# Patient Record
Sex: MALE | Race: WHITE | NOT HISPANIC OR LATINO | Employment: FULL TIME | URBAN - METROPOLITAN AREA
[De-identification: names, ages, dates, MRNs, and addresses within clinical notes are randomized per-mention and may not be internally consistent; named-entity substitution may affect disease eponyms.]

---

## 2017-04-13 ENCOUNTER — HOSPITAL ENCOUNTER (OUTPATIENT)
Facility: MEDICAL CENTER | Age: 62
End: 2017-04-13
Attending: EMERGENCY MEDICINE | Admitting: SURGERY
Payer: COMMERCIAL

## 2017-04-13 VITALS
WEIGHT: 193.12 LBS | SYSTOLIC BLOOD PRESSURE: 116 MMHG | HEIGHT: 70 IN | DIASTOLIC BLOOD PRESSURE: 90 MMHG | TEMPERATURE: 97.9 F | HEART RATE: 68 BPM | RESPIRATION RATE: 20 BRPM | OXYGEN SATURATION: 95 % | BODY MASS INDEX: 27.65 KG/M2

## 2017-04-13 DIAGNOSIS — K40.30 INCARCERATED LEFT INGUINAL HERNIA: ICD-10-CM

## 2017-04-13 PROBLEM — K46.0 INCARCERATED HERNIA: Status: ACTIVE | Noted: 2017-04-13

## 2017-04-13 LAB
ANION GAP SERPL CALC-SCNC: 8 MMOL/L (ref 0–11.9)
APPEARANCE UR: CLEAR
APTT PPP: 26.2 SEC (ref 24.7–36)
BASOPHILS # BLD AUTO: 0.8 % (ref 0–1.8)
BASOPHILS # BLD: 0.06 K/UL (ref 0–0.12)
BILIRUB UR QL STRIP.AUTO: NEGATIVE
BUN SERPL-MCNC: 18 MG/DL (ref 8–22)
CALCIUM SERPL-MCNC: 8.8 MG/DL (ref 8.4–10.2)
CHLORIDE SERPL-SCNC: 105 MMOL/L (ref 96–112)
CO2 SERPL-SCNC: 24 MMOL/L (ref 20–33)
COLOR UR: NORMAL
CREAT SERPL-MCNC: 1.1 MG/DL (ref 0.5–1.4)
CULTURE IF INDICATED INDCX: NO UA CULTURE
EOSINOPHIL # BLD AUTO: 0.36 K/UL (ref 0–0.51)
EOSINOPHIL NFR BLD: 5 % (ref 0–6.9)
ERYTHROCYTE [DISTWIDTH] IN BLOOD BY AUTOMATED COUNT: 38.6 FL (ref 35.9–50)
GFR SERPL CREATININE-BSD FRML MDRD: >60 ML/MIN/1.73 M 2
GLUCOSE SERPL-MCNC: 103 MG/DL (ref 65–99)
GLUCOSE UR STRIP.AUTO-MCNC: NEGATIVE MG/DL
HCT VFR BLD AUTO: 47.4 % (ref 42–52)
HGB BLD-MCNC: 16.9 G/DL (ref 14–18)
IMM GRANULOCYTES # BLD AUTO: 0.01 K/UL (ref 0–0.11)
IMM GRANULOCYTES NFR BLD AUTO: 0.1 % (ref 0–0.9)
INR PPP: 0.97 (ref 0.87–1.13)
KETONES UR STRIP.AUTO-MCNC: NEGATIVE MG/DL
LEUKOCYTE ESTERASE UR QL STRIP.AUTO: NEGATIVE
LYMPHOCYTES # BLD AUTO: 1.09 K/UL (ref 1–4.8)
LYMPHOCYTES NFR BLD: 15.1 % (ref 22–41)
MCH RBC QN AUTO: 31 PG (ref 27–33)
MCHC RBC AUTO-ENTMCNC: 35.7 G/DL (ref 33.7–35.3)
MCV RBC AUTO: 87 FL (ref 81.4–97.8)
MICRO URNS: NORMAL
MONOCYTES # BLD AUTO: 0.69 K/UL (ref 0–0.85)
MONOCYTES NFR BLD AUTO: 9.6 % (ref 0–13.4)
NEUTROPHILS # BLD AUTO: 5.01 K/UL (ref 1.82–7.42)
NEUTROPHILS NFR BLD: 69.4 % (ref 44–72)
NITRITE UR QL STRIP.AUTO: NEGATIVE
NRBC # BLD AUTO: 0 K/UL
NRBC BLD AUTO-RTO: 0 /100 WBC
PH UR STRIP.AUTO: 5.5 [PH]
PLATELET # BLD AUTO: 206 K/UL (ref 164–446)
PMV BLD AUTO: 8.1 FL (ref 9–12.9)
POTASSIUM SERPL-SCNC: 3.8 MMOL/L (ref 3.6–5.5)
PROT UR QL STRIP: NEGATIVE MG/DL
PROTHROMBIN TIME: 12.7 SEC (ref 12–14.6)
RBC # BLD AUTO: 5.45 M/UL (ref 4.7–6.1)
RBC UR QL AUTO: NEGATIVE
SODIUM SERPL-SCNC: 137 MMOL/L (ref 135–145)
SP GR UR STRIP.AUTO: <=1.005
WBC # BLD AUTO: 7.2 K/UL (ref 4.8–10.8)

## 2017-04-13 PROCEDURE — 500888 HCHG PACK, MINOR BASIN: Performed by: SURGERY

## 2017-04-13 PROCEDURE — G0378 HOSPITAL OBSERVATION PER HR: HCPCS

## 2017-04-13 PROCEDURE — C1781 MESH (IMPLANTABLE): HCPCS | Performed by: SURGERY

## 2017-04-13 PROCEDURE — 501568 HCHG TROCAR, BLUNTPORT 12MM: Performed by: SURGERY

## 2017-04-13 PROCEDURE — 500380 HCHG DRAIN, PENROSE 1/4X12: Performed by: SURGERY

## 2017-04-13 PROCEDURE — 700111 HCHG RX REV CODE 636 W/ 250 OVERRIDE (IP)

## 2017-04-13 PROCEDURE — 96374 THER/PROPH/DIAG INJ IV PUSH: CPT

## 2017-04-13 PROCEDURE — 700101 HCHG RX REV CODE 250

## 2017-04-13 PROCEDURE — 85730 THROMBOPLASTIN TIME PARTIAL: CPT

## 2017-04-13 PROCEDURE — 501571 HCHG TROCAR, SEPARATOR 12X100: Performed by: SURGERY

## 2017-04-13 PROCEDURE — 700105 HCHG RX REV CODE 258: Performed by: EMERGENCY MEDICINE

## 2017-04-13 PROCEDURE — 160035 HCHG PACU - 1ST 60 MINS PHASE I: Performed by: SURGERY

## 2017-04-13 PROCEDURE — 80048 BASIC METABOLIC PNL TOTAL CA: CPT

## 2017-04-13 PROCEDURE — 500800 HCHG LAPAROSCOPIC J/L HOOK: Performed by: SURGERY

## 2017-04-13 PROCEDURE — 85025 COMPLETE CBC W/AUTO DIFF WBC: CPT

## 2017-04-13 PROCEDURE — 85610 PROTHROMBIN TIME: CPT

## 2017-04-13 PROCEDURE — A6402 STERILE GAUZE <= 16 SQ IN: HCPCS | Performed by: SURGERY

## 2017-04-13 PROCEDURE — 110382 HCHG SHELL REV 271: Performed by: SURGERY

## 2017-04-13 PROCEDURE — 36415 COLL VENOUS BLD VENIPUNCTURE: CPT

## 2017-04-13 PROCEDURE — 160009 HCHG ANES TIME/MIN: Performed by: SURGERY

## 2017-04-13 PROCEDURE — 99285 EMERGENCY DEPT VISIT HI MDM: CPT

## 2017-04-13 PROCEDURE — 160039 HCHG SURGERY MINUTES - EA ADDL 1 MIN LEVEL 3: Performed by: SURGERY

## 2017-04-13 PROCEDURE — 160036 HCHG PACU - EA ADDL 30 MINS PHASE I: Performed by: SURGERY

## 2017-04-13 PROCEDURE — 501838 HCHG SUTURE GENERAL: Performed by: SURGERY

## 2017-04-13 PROCEDURE — 501574 HCHG TROCAR, SMTH CAN&SEAL 5: Performed by: SURGERY

## 2017-04-13 PROCEDURE — 81003 URINALYSIS AUTO W/O SCOPE: CPT

## 2017-04-13 PROCEDURE — 500383 HCHG DRAIN, PENROSE 3/8X12: Performed by: SURGERY

## 2017-04-13 PROCEDURE — 160002 HCHG RECOVERY MINUTES (STAT): Performed by: SURGERY

## 2017-04-13 PROCEDURE — 700111 HCHG RX REV CODE 636 W/ 250 OVERRIDE (IP): Performed by: EMERGENCY MEDICINE

## 2017-04-13 PROCEDURE — A4606 OXYGEN PROBE USED W OXIMETER: HCPCS | Performed by: SURGERY

## 2017-04-13 PROCEDURE — 110371 HCHG SHELL REV 272: Performed by: SURGERY

## 2017-04-13 PROCEDURE — 160048 HCHG OR STATISTICAL LEVEL 1-5: Performed by: SURGERY

## 2017-04-13 PROCEDURE — 160028 HCHG SURGERY MINUTES - 1ST 30 MINS LEVEL 3: Performed by: SURGERY

## 2017-04-13 DEVICE — GRAFT PROLENE HERNIA - (3EA/BX): Type: IMPLANTABLE DEVICE | Status: FUNCTIONAL

## 2017-04-13 RX ORDER — HYDROMORPHONE HYDROCHLORIDE 2 MG/1
2-4 TABLET ORAL
Qty: 30 TAB | Refills: 0 | Status: SHIPPED | OUTPATIENT
Start: 2017-04-13 | End: 2018-09-11

## 2017-04-13 RX ORDER — BUPIVACAINE HYDROCHLORIDE AND EPINEPHRINE 5; 5 MG/ML; UG/ML
INJECTION, SOLUTION EPIDURAL; INTRACAUDAL; PERINEURAL
Status: DISCONTINUED | OUTPATIENT
Start: 2017-04-13 | End: 2017-04-13 | Stop reason: HOSPADM

## 2017-04-13 RX ORDER — SODIUM CHLORIDE 9 MG/ML
INJECTION, SOLUTION INTRAVENOUS CONTINUOUS
Status: DISCONTINUED | OUTPATIENT
Start: 2017-04-13 | End: 2017-04-13

## 2017-04-13 RX ORDER — SODIUM CHLORIDE, SODIUM LACTATE, POTASSIUM CHLORIDE, CALCIUM CHLORIDE 600; 310; 30; 20 MG/100ML; MG/100ML; MG/100ML; MG/100ML
INJECTION, SOLUTION INTRAVENOUS
Status: DISCONTINUED | OUTPATIENT
Start: 2017-04-13 | End: 2017-04-14 | Stop reason: HOSPADM

## 2017-04-13 RX ORDER — SODIUM CHLORIDE 9 MG/ML
INJECTION, SOLUTION INTRAVENOUS CONTINUOUS
Status: DISCONTINUED | OUTPATIENT
Start: 2017-04-13 | End: 2017-04-14 | Stop reason: HOSPADM

## 2017-04-13 RX ORDER — ONDANSETRON 2 MG/ML
4 INJECTION INTRAMUSCULAR; INTRAVENOUS ONCE
Status: COMPLETED | OUTPATIENT
Start: 2017-04-13 | End: 2017-04-13

## 2017-04-13 RX ORDER — FAMOTIDINE 20 MG/1
20 TABLET, FILM COATED ORAL
COMMUNITY

## 2017-04-13 RX ORDER — METOCLOPRAMIDE HYDROCHLORIDE 5 MG/ML
10 INJECTION INTRAMUSCULAR; INTRAVENOUS
Status: DISCONTINUED | OUTPATIENT
Start: 2017-04-13 | End: 2017-04-14 | Stop reason: HOSPADM

## 2017-04-13 RX ADMIN — SODIUM CHLORIDE, SODIUM LACTATE, POTASSIUM CHLORIDE, CALCIUM CHLORIDE: 600; 310; 30; 20 INJECTION, SOLUTION INTRAVENOUS at 16:25

## 2017-04-13 RX ADMIN — SODIUM CHLORIDE: 9 INJECTION, SOLUTION INTRAVENOUS at 14:45

## 2017-04-13 RX ADMIN — SODIUM CHLORIDE 1000 ML: 9 INJECTION, SOLUTION INTRAVENOUS at 09:56

## 2017-04-13 RX ADMIN — ONDANSETRON 4 MG: 2 INJECTION, SOLUTION INTRAMUSCULAR; INTRAVENOUS at 09:57

## 2017-04-13 ASSESSMENT — LIFESTYLE VARIABLES
EVER_SMOKED: NEVER
ON A TYPICAL DAY WHEN YOU DRINK ALCOHOL HOW MANY DRINKS DO YOU HAVE: 1
EVER HAD A DRINK FIRST THING IN THE MORNING TO STEADY YOUR NERVES TO GET RID OF A HANGOVER: NO
ALCOHOL_USE: YES
HAVE YOU EVER FELT YOU SHOULD CUT DOWN ON YOUR DRINKING: NO
CONSUMPTION TOTAL: NEGATIVE
EVER FELT BAD OR GUILTY ABOUT YOUR DRINKING: NO
TOTAL SCORE: 0
HOW MANY TIMES IN THE PAST YEAR HAVE YOU HAD 5 OR MORE DRINKS IN A DAY: 0
AVERAGE NUMBER OF DAYS PER WEEK YOU HAVE A DRINK CONTAINING ALCOHOL: 1
TOTAL SCORE: 0
HAVE PEOPLE ANNOYED YOU BY CRITICIZING YOUR DRINKING: NO
TOTAL SCORE: 0

## 2017-04-13 ASSESSMENT — PAIN SCALES - GENERAL
PAINLEVEL_OUTOF10: 2
PAINLEVEL_OUTOF10: ASSUMED PAIN PRESENT
PAINLEVEL_OUTOF10: 2
PAINLEVEL_OUTOF10: 4

## 2017-04-13 NOTE — IP AVS SNAPSHOT
4/13/2017    Ron [facility]  [address]  [place] [address]    Dear Ron:    [facility] wants to ensure your discharge home is safe and you or your loved ones have had all of your questions answered regarding your care after you leave the hospital.    Below is a list of resources and contact information should you have any questions regarding your hospital stay, follow-up instructions, or active medical symptoms.    Questions or Concerns Regarding… Contact   Medical Questions Related to Your Discharge  (7 days a week, 8am-5pm) Contact a Nurse Care Coordinator   [phone]   Medical Questions Not Related to Your Discharge  (24 hours a day / 7 days a week)  Contact the Nurse Health Line   [phone]    Medications or Discharge Instructions Refer to your discharge packet   or contact your [facility] Primary Care Provider   [phone]   Follow-up Appointment(s) Schedule your appointment via [employer]   or contact Scheduling [phone]   Billing Review your statement via [employer]  or contact Billing [phone]   Medical Records Review your records via [employer]   or contact Medical Records [phone]     You may receive a telephone call within two days of discharge. This call is to make certain you understand your discharge instructions and have the opportunity to have any questions answered. You can also easily access your medical information, test results and upcoming appointments via the [employer] free online health management tool. You can learn more and sign up at [employer]. For assistance setting up your [employer] account, please call [phone].    Once again, we want to ensure your discharge home is safe and that you have a clear understanding of any next steps in your care. If you have any questions or concerns, please do not hesitate to contact us, we are here for you. Thank you for choosing [facility] for your healthcare needs.    Sincerely,    Your [facility] Healthcare Team

## 2017-04-13 NOTE — OR NURSING
1610 Patient allergies and NPO status verified, home medication reconciliation completed and belongings secured. Surgical site verified with patient. Patient verbalizes understanding of pain scale, expected course of stay and plan of care; patient and family state verbal understanding at this time. IV access verified. Sequentials in place as ordered.

## 2017-04-13 NOTE — IP AVS SNAPSHOT
" Home Care Instructions                                                                                                                  Name:Ron Haynes  Medical Record Number:[id number]  CSN: [id number]    YOB: 1955   Age: 61 y.o.  Sex: male  HT:1.778 m (5' 10\") WT: 87.6 kg (193 lb 2 oz)          Admit Date: 4/13/2017     Discharge Date:   Today's Date: 4/13/2017  Attending Doctor:  Selvin Peña M.D.                  Allergies:  Oxycodone            Discharge Instructions                                                                                                                                      Hernia Repair Discharge Instructions:     1. ACTIVITIES: Upon discharge from the hospital, the day of surgery it is requested that you do no significant physical activity and limit mental activities, as you have had sedation. The day after surgery, you may resume activities of daily living, but for four weeks, it is recommended that you do no strenuous activities or heavy lifting (greater than 15 pounds).     2. DRIVING: You may drive whenever you are off pain medications and are able to perform the activities needed to drive, i.e. turning, bending, twisting, etc.     3. WOUND: It is not unusual for patients to experience swelling and even bruising at the hernia repair site. With inguinal hernias, sometimes the bruising and swelling may extend on to the penis or into the scrotum of male patients. This will resolve over the next few days.     4. ICE: please use ice on the wound to decrease the swelling for the first 24 hours and then discontinue.     5. BATHING: The dressing can be removed two days after surgery and the wound can then be wetted in a shower as normal, but avoid submersion in water (tub bath) for at least a week.     6. PAIN MEDICATION: You will be given a prescription for pain medication at discharge. Please take these as directed. It is important to remember not to take medications on an " empty stomach as this may cause nausea.     7. BOWEL FUNCTION: After hernia repair, it is not uncommon for patients to experience constipation. This is due to decreasing activity levels as well as pain medications. You may wish to use a stool softener beginning immediately after surgery, and you may or may not need to use a laxative (Milk of Magnesia, Ex-lax; Senokot, etc.) as well.            8 .CALL IF YOU HAVE: (1) Fevers to more than 1010 F, (2) Unusual chest or leg pain, (3) Drainage or fluid from incision that may be foul smelling, increased tenderness or soreness at the wound or the wound edges are no longer together,redness or swelling at the incision site. Please do not hesitate to call with any other questions.     9. APPOINTMENT: Contact our office at [phone] for a follow-up appointment in 1 to 2 weeks following your procedure.     If you have any additional questions, please do not hesitate to call the office and speak to either myself or the physician on call.     Office address:   [address] [place] [id number]       Selvin Peña M.D.   [phone]       Discharge Instructions    Discharged to home by car with relative. Discharged via wheelchair, hospital escort: Yes.  Special equipment needed: Not Applicable    Be sure to schedule a follow-up appointment with your primary care doctor or any specialists as instructed.     Discharge Plan:   Diet Plan: Discussed  Activity Level: Discussed  Confirmed Follow up Appointment: Patient to Call and Schedule Appointment  Confirmed Symptoms Management: Discussed  Medication Reconciliation Updated: Yes  Influenza Vaccine Indication: Patient Refuses    I understand that a diet low in cholesterol, fat, and sodium is recommended for good health. Unless I have been given specific instructions below for another diet, I accept this instruction as my diet prescription.   Other diet: Regular    Special Instructions: None    · Is patient discharged on  Warfarin / Coumadin?   No     · Is patient Post Blood Transfusion?  No    Depression / Suicide Risk    As you are discharged from this [facility] facility, it is important to learn how to keep safe from harming yourself.    Recognize the warning signs:  · Abrupt changes in personality, positive or negative- including increase in energy   · Giving away possessions  · Change in eating patterns- significant weight changes-  positive or negative  · Change in sleeping patterns- unable to sleep or sleeping all the time   · Unwillingness or inability to communicate  · Depression  · Unusual sadness, discouragement and loneliness  · Talk of wanting to die  · Neglect of personal appearance   · Rebelliousness- reckless behavior  · Withdrawal from people/activities they love  · Confusion- inability to concentrate     If you or a loved one observes any of these behaviors or has concerns about self-harm, here's what you can do:  · Talk about it- your feelings and reasons for harming yourself  · Remove any means that you might use to hurt yourself (examples: pills, rope, extension cords, firearm)  · Get professional help from the community (Mental Health, Substance Abuse, psychological counseling)  · Do not be alone:Call your Safe Contact- someone whom you trust who will be there for you.  · Call your local CRISIS HOTLINE 937-1439 or 540-695-9503  · Call your local Children's Mobile Crisis Response Team [place] (016) 134-0581 or www.[employer]  · Call the toll free National Suicide Prevention Hotlines   · National Suicide Prevention Lifeline 746-877-QRDU (6842)  · National Hope Line Network 800-SUICIDE (364-8896)         Open Hernia Repair, Care After    Refer to this sheet in the next few weeks. These instructions provide you with information on caring for yourself after your procedure. Your health care provider may also give you more specific instructions. Your treatment has been planned according to current  medical practices, but problems sometimes occur. Call your health care provider if you have any problems or questions after your procedure.  WHAT TO EXPECT AFTER THE PROCEDURE  After your procedure, it is typical to have the following:  · Pain in your abdomen, especially along your incision. You will be given pain medicines to control the pain.  · Constipation. You may be given a stool softener to help prevent this.  HOME CARE INSTRUCTIONS  2. Only take over-the-counter or prescription medicines as directed by your health care provider.  3. Keep the incision area dry and clean. You may wash the incision area gently with soap and water 48 hours after surgery. Gently blot or dab the incision area dry. Do not take baths, use swimming pools, or use hot tubs for 10 days or until your health care provider approves.  4. Change bandages (dressings) as directed by your health care provider.  5. Continue your normal diet as directed by your health care provider. Eat plenty of fruits and vegetables to help prevent constipation.  6. Drink enough fluids to keep your urine clear or pale yellow. This also helps prevent constipation.  7. Do not drive until your health care provider says it is okay.  8. Do not lift anything heavier than 10 lb (4.5 kg) or play contact sports for 4 weeks or until your health care provider approves.  9. Follow up with your health care provider as directed. Ask your health care provider when to make an appointment to have your stitches (sutures) or staples removed.  SEEK MEDICAL CARE IF:  2. You have increased bleeding coming from the incision site.  3. You have blood in your stool.  4. You have increasing pain in the incision area.  5. You see redness or swelling in the incision area.  6. You have fluid (pus) coming from the incision.  7. You have a fever.  8. You notice a bad smell coming from the incision area or dressing.  SEEK IMMEDIATE MEDICAL CARE IF:  2. You develop a rash.  3. You have chest  pain or shortness of breath.  4. You feel lightheaded or feel faint.     This information is not intended to replace advice given to you by your health care provider. Make sure you discuss any questions you have with your health care provider.     Document Released: 07/07/2006 Document Revised: 01/08/2016 Document Reviewed: 07/30/2014  General Specific Interactive Patient Education ©2016 Elsevier Inc.    Hydromorphone tablets  What is this medicine?  HYDROMORPHONE (raúl droe MOR fone) is a pain reliever. It is used to treat moderate to severe pain.  This medicine may be used for other purposes; ask your health care provider or pharmacist if you have questions.  COMMON BRAND NAME(S): Dilaudid  What should I tell my health care provider before I take this medicine?  They need to know if you have any of these conditions:  -brain tumor  -drug abuse or addiction  -head injury  -heart disease  -frequently drink alcohol containing drinks  -kidney disease or problems going to the bathroom  -liver disease  -lung disease, asthma, or breathing problems  -mental problems  -an allergic or unusual reaction to lactose, hydromorphone, other opioid analgesics, other medicines, sulfites, foods, dyes, or preservatives  -pregnant or trying to get pregnant  -breast-feeding  How should I use this medicine?  Take this medicine by mouth with a glass of water. If the medicine upsets your stomach, take it with food or milk. Follow the directions on the prescription label. Do not take more medicine than you are told to take.  Talk to your pediatrician regarding the use of this medicine in children. Special care may be needed.  Overdosage: If you think you have taken too much of this medicine contact a poison control center or emergency room at once.  NOTE: This medicine is only for you. Do not share this medicine with others.  What if I miss a dose?  If you miss a dose, take it as soon as you can. If it is almost time for your next dose, take only  that dose. Do not take double or extra doses.  What may interact with this medicine?  -alcohol  -antihistamines for allergy, cough and cold  -medicines for anesthesia  -medicines for depression, anxiety, or psychotic disturbances  -medicines for sleep  -muscle relaxants  -naltrexone  -narcotic medicines (opiates) for pain  -phenothiazines like chlorpromazine, mesoridazine, prochlorperazine, thioridazine  -tramadol  This list may not describe all possible interactions. Give your health care provider a list of all the medicines, herbs, non-prescription drugs, or dietary supplements you use. Also tell them if you smoke, drink alcohol, or use illegal drugs. Some items may interact with your medicine.  What should I watch for while using this medicine?  Tell your doctor or health care professional if your pain does not go away, if it gets worse, or if you have new or a different type of pain. You may develop tolerance to the medicine. Tolerance means that you will need a higher dose of the medicine for pain relief. Tolerance is normal and is expected if you take this medicine for a long time.  Do not suddenly stop taking your medicine because you may develop a severe reaction. Your body becomes used to the medicine. This does NOT mean you are addicted. Addiction is a behavior related to getting and using a drug for a non-medical reason. If you have pain, you have a medical reason to take pain medicine. Your doctor will tell you how much medicine to take. If your doctor wants you to stop the medicine, the dose will be slowly lowered over time to avoid any side effects.  You may get drowsy or dizzy. Do not drive, use machinery, or do anything that needs mental alertness until you know how this medicine affects you. Do not stand or sit up quickly, especially if you are an older patient. This reduces the risk of dizzy or fainting spells. Alcohol may interfere with the effect of this medicine. Avoid alcoholic drinks.  There  are different types of narcotic medicines (opiates) for pain. If you take more than one type at the same time, you may have more side effects. Give your health care provider a list of all medicines you use. Your doctor will tell you how much medicine to take. Do not take more medicine than directed. Call emergency for help if you have problems breathing.  This medicine will cause constipation. Try to have a bowel movement at least every 2 to 3 days. If you do not have a bowel movement for 3 days, call your doctor or health care professional.  Your mouth may get dry. Chewing sugarless gum or sucking hard candy, and drinking plenty of water may help. Contact your doctor if the problem does not go away or is severe.  What side effects may I notice from receiving this medicine?  Side effects that you should report to your doctor or health care professional as soon as possible:  -allergic reactions like skin rash, itching or hives, swelling of the face, lips, or tongue  -breathing problems  -changes in vision  -confusion  -feeling faint or lightheaded, falls  -seizures  -slow or fast heartbeat  -trouble passing urine or change in the amount of urine  -trouble with balance, talking, walking  Side effects that usually do not require medical attention (report to your doctor or health care professional if they continue or are bothersome):  -difficulty sleeping  -drowsiness  -dry mouth  -flushing  -headache  -itching  -loss of appetite  -nausea, vomiting  This list may not describe all possible side effects. Call your doctor for medical advice about side effects. You may report side effects to FDA at 7-556-FDA-6467.  Where should I keep my medicine?  Keep out of the reach of children. This medicine can be abused. Keep your medicine in a safe place to protect it from theft. Do not share this medicine with anyone. Selling or giving away this medicine is dangerous and against the law.  Store at room temperature between 15 and 30  degrees C (59 and 86 degrees F). Keep container tightly closed. Protect from light.  Discard unused medicine and used packaging carefully. Pets and children can be harmed if they find used or lost packages. Flush any unused medicines down the toilet. Do not use the medicine after the expiration date.  NOTE: This sheet is a summary. It may not cover all possible information. If you have questions about this medicine, talk to your doctor, pharmacist, or health care provider.  © 2014, Elsevier/[employer]. (8/24/2012 3:08:53 PM)      Follow-up Information     1. Follow up with Selvin Peña M.D.. Call in 1 week.    Specialty:  Surgery    Contact information    [address] [facility] [address]  [place] [address] [phone]           Discharge Medication Instructions:    Below are the medications your physician expects you to take upon discharge:    Review all your home medications and newly ordered medications with your doctor and/or pharmacist. Follow medication instructions as directed by your doctor and/or pharmacist.    Please keep your medication list with you and share with your physician.               Medication List      START taking these medications        Instructions    Morning Afternoon Evening Bedtime    HYDROmorphone 2 MG Tabs   Commonly known as:  DILAUDID        Take 1-2 Tabs by mouth every 3 hours as needed for Severe Pain.   Dose:  2-4 mg                          CONTINUE taking these medications        Instructions    Morning Afternoon Evening Bedtime    enalapril 10 MG Tabs   Commonly known as:  VASOTEC        TAKE 1 TAB BY MOUTH 2 TIMES A DAY.                        famotidine 20 MG Tabs   Commonly known as:  PEPCID        Take 20 mg by mouth every bedtime.   Dose:  20 mg                        hydrochlorothiazide 12.5 MG capsule   Commonly known as:  MICROZIDE        TAKE 1 CAP BY MOUTH EVERY DAY.                        multivitamin Tabs        Take 1 Tab by mouth every day.   Dose:  1 Tab                              Where to Get Your Medications      You can get these medications from any pharmacy     Bring a paper prescription for each of these medications    - HYDROmorphone 2 MG Tabs            Instructions           Diet / Nutrition:    Follow any diet instructions given to you by your doctor or the dietician, including how much salt (sodium) you are allowed each day.    If you are overweight, talk to your doctor about a weight reduction plan.    Activity:    Remain physically active following your doctor's instructions about exercise and activity.    Rest often.     Any time you become even a little tired or short of breath, SIT DOWN and rest.    Worsening Symptoms:    Report any of the following signs and symptoms to the doctor's office immediately:    *Pain of jaw, arm, or neck  *Chest pain not relieved by medication                               *Dizziness or loss of consciousness  *Difficulty breathing even when at rest   *More tired than usual                                       *Bleeding drainage or swelling of surgical site  *Swelling of feet, ankles, legs or stomach                 *Fever (>100ºF)  *Pink or blood tinged sputum  *Weight gain (3lbs/day or 5lbs /week)           *Shock from internal defibrillator (if applicable)  *Palpitations or irregular heartbeats                *Cool and/or numb extremities    Stroke Awareness    Common Risk Factors for Stroke include:    Age  Atrial Fibrillation  Carotid Artery Stenosis  Diabetes Mellitus  Excessive alcohol consumption  High blood pressure  Overweight   Physical inactivity  Smoking    Warning signs and symptoms of a stroke include:    *Sudden numbness or weakness of the face, arm or leg (especially on one side of the body).  *Sudden confusion, trouble speaking or understanding.  *Sudden trouble seeing in one or both eyes.  *Sudden trouble walking, dizziness, loss of balance or coordination.Sudden severe headache with no known cause.    It  is very important to get treatment quickly when a stroke occurs. If you experience any of the above warning signs, call 911 immediately.                   Disclaimer         Quit Smoking / Tobacco Use:    I understand the use of any tobacco products increases my chance of suffering from future heart disease or stroke and could cause other illnesses which may shorten my life. Quitting the use of tobacco products is the single most important thing I can do to improve my health. For further information on smoking / tobacco cessation call a Toll Free Quit Line at 1-362.402.4013 (*National Cancer [place]) or 1-121.249.6905 (American Lung Association) or you can access the web based program at www.lungusa.org.    [place] Tobacco Users Help Line:  (625) 244-4261       Toll Free: 1-293.251.6907  Quit Tobacco Program [facility] (101)300-7692    Crisis Hotline:    [place] Crisis Hotline:  6-226-VSPERKZ or 1-854.367.7646    [place] Crisis Hotline:    1-823.197.8468 or 104-788-2957    Discharge Survey:   Thank you for choosing [facility]. We hope we did everything we could to make your hospital stay a pleasant one. You may be receiving a phone survey and we would appreciate your time and participation in answering the questions. Your input is very valuable to us in our efforts to improve our service to our patients and their families.        My signature on this form indicates that:    1. I have reviewed and understand the above information.  2. My questions regarding this information have been answered to my satisfaction.  3. I have formulated a plan with my discharge nurse to obtain my prescribed medications for home.                  Disclaimer         __________________________________                     __________       ________                       Patient Signature                                                 Date                    Time

## 2017-04-13 NOTE — IP AVS SNAPSHOT
" <p align=\"LEFT\"><IMG SRC=\"[email or web address]\" alt=\"Image\" WIDTH=\"50%\" HEIGHT=\"200\" BORDER=\"\"></p>                   Name:Ron Haynes  Medical Record Number:[id number]  CSN: [id number]    YOB: 1955   Age: 61 y.o.  Sex: male  HT:1.778 m (5' 10\") WT: 87.6 kg (193 lb 2 oz)          Admit Date: 4/13/2017     Discharge Date:   Today's Date: 4/13/2017  Attending Doctor:  Selvin Peña M.D.                  Allergies:  Oxycodone          Follow-up Information     1. Follow up with Selvin Peña M.D.. Call in 1 week.    Specialty:  Surgery    Contact information    [address] [facility] [address]  [facility] [id number] [phone]           Medication List      Take these Medications        Instructions    enalapril 10 MG Tabs   Commonly known as:  VASOTEC    TAKE 1 TAB BY MOUTH 2 TIMES A DAY.       famotidine 20 MG Tabs   Commonly known as:  PEPCID    Take 20 mg by mouth every bedtime.   Dose:  20 mg       hydrochlorothiazide 12.5 MG capsule   Commonly known as:  MICROZIDE    TAKE 1 CAP BY MOUTH EVERY DAY.       HYDROmorphone 2 MG Tabs   Commonly known as:  DILAUDID    Take 1-2 Tabs by mouth every 3 hours as needed for Severe Pain.   Dose:  2-4 mg       multivitamin Tabs    Take 1 Tab by mouth every day.   Dose:  1 Tab         "

## 2017-04-13 NOTE — ED PROVIDER NOTES
ED Provider Note    CHIEF COMPLAINT  Chief Complaint   Patient presents with   • Abdominal Pain       HPI  Ron Haynes is a 61 y.o. male who presents to the emergency department with a chief complaint of a possible hernia. The patient states that 3 or 4 days ago he developed a bump in the left inguinal region. Says it has been progressively more painful for last several days. He has not had any vomiting. He continues to pass gas. He's had a bowel movement today. Denies any fevers. No dysuria.    REVIEW OF SYSTEMS  See HPI for further details. All other systems are negative.     PAST MEDICAL HISTORY  Past Medical History   Diagnosis Date   • Allergy    • Cancer (CMS-HCC)    • Hypertension    • Migraine        FAMILY HISTORY  Family History   Problem Relation Age of Onset   • Hypertension Mother    • Lung Disease Father    • Cancer Father    • Prostate cancer Father    • Thyroid Father    • Hypertension Father    • Allergies Father    • Arthritis Father        SOCIAL HISTORY  Social History     Social History   • Marital Status:      Spouse Name: N/A   • Number of Children: N/A   • Years of Education: N/A     Social History Main Topics   • Smoking status: Never Smoker    • Smokeless tobacco: Never Used   • Alcohol Use: 0.5 - 3.5 oz/week     1-7 Standard drinks or equivalent per week      Comment: rare   • Drug Use: No   • Sexual Activity:     Partners: Female     Other Topics Concern   • None     Social History Narrative       SURGICAL HISTORY  Past Surgical History   Procedure Laterality Date   • Cholecystectomy     • Prostatectomy, radial     • Hernia repair         CURRENT MEDICATIONS  Home Medications     Reviewed by Nicol Cervantes (Pharmacy Tech) on 04/13/17 at 0955  Med List Status: Complete    Medication Last Dose Status    enalapril (VASOTEC) 10 MG TABS 4/13/2017 Active    famotidine (PEPCID) 20 MG Tab 4/12/2017 Active    hydrochlorothiazide (MICROZIDE) 12.5 MG capsule 4/13/2017 Active     "multivitamin (THERAGRAN) Tab 4/13/2017 Active                ALLERGIES  Allergies   Allergen Reactions   • Oxycodone Rash     rash       PHYSICAL EXAM  VITAL SIGNS: /88 mmHg  Pulse 73  Temp(Src) 36.3 °C (97.3 °F)  Resp 16  Ht 1.778 m (5' 10\")  Wt 87.6 kg (193 lb 2 oz)  BMI 27.71 kg/m2  SpO2 95%  Constitutional: Well developed, Well nourished, mild distress, Non-toxic appearance.   HENT: Normocephalic, Atraumatic, Bilateral external ears normal, Oropharynx moist, No oral exudates, Nose normal.   Eyes: PERRL, EOMI, Conjunctiva normal, No discharge.   Neck: Normal range of motion, No tenderness, Supple, No stridor.   Lymphatic: No lymphadenopathy noted.   Cardiovascular: Normal heart rate, Normal rhythm, No murmurs, No rubs, No gallops.   Thorax & Lungs: Normal breath sounds, No respiratory distress, No wheezing, No chest tenderness.   Abdomen: Soft, nontender, there is fullness in the left suprapubic region, there is a palpable hernia with Valsalva, I'm unable to reduce the hernia. Somewhat tender to palpation. There is no overlying skin changes.   Skin: Warm, Dry, No erythema, No rash.   Back: No tenderness, No CVA tenderness.   Extremities: Intact distal pulses, No edema, No tenderness, No cyanosis, No clubbing.   Neurologic: Alert & oriented x 3, Normal motor function, Normal sensory function, No focal deficits noted.       RADIOLOGY/PROCEDURES  Results for orders placed or performed during the hospital encounter of 04/13/17   CBC WITH DIFFERENTIAL   Result Value Ref Range    WBC 7.2 4.8 - 10.8 K/uL    RBC 5.45 4.70 - 6.10 M/uL    Hemoglobin 16.9 14.0 - 18.0 g/dL    Hematocrit 47.4 42.0 - 52.0 %    MCV 87.0 81.4 - 97.8 fL    MCH 31.0 27.0 - 33.0 pg    MCHC 35.7 (H) 33.7 - 35.3 g/dL    RDW 38.6 35.9 - 50.0 fL    Platelet Count 206 164 - 446 K/uL    MPV 8.1 (L) 9.0 - 12.9 fL    Neutrophils-Polys 69.40 44.00 - 72.00 %    Lymphocytes 15.10 (L) 22.00 - 41.00 %    Monocytes 9.60 0.00 - 13.40 %    Eosinophils " 5.00 0.00 - 6.90 %    Basophils 0.80 0.00 - 1.80 %    Immature Granulocytes 0.10 0.00 - 0.90 %    Nucleated RBC 0.00 /100 WBC    Neutrophils (Absolute) 5.01 1.82 - 7.42 K/uL    Lymphs (Absolute) 1.09 1.00 - 4.80 K/uL    Monos (Absolute) 0.69 0.00 - 0.85 K/uL    Eos (Absolute) 0.36 0.00 - 0.51 K/uL    Baso (Absolute) 0.06 0.00 - 0.12 K/uL    Immature Granulocytes (abs) 0.01 0.00 - 0.11 K/uL    NRBC (Absolute) 0.00 K/uL   BASIC METABOLIC PANEL   Result Value Ref Range    Sodium 137 135 - 145 mmol/L    Potassium 3.8 3.6 - 5.5 mmol/L    Chloride 105 96 - 112 mmol/L    Co2 24 20 - 33 mmol/L    Glucose 103 (H) 65 - 99 mg/dL    Bun 18 8 - 22 mg/dL    Creatinine 1.10 0.50 - 1.40 mg/dL    Calcium 8.8 8.4 - 10.2 mg/dL    Anion Gap 8.0 0.0 - 11.9   URINALYSIS CULTURE, IF INDICATED   Result Value Ref Range    Micro Urine Req see below     Culture Indicated No UA Culture    Color Straw     Character Clear     Specific Gravity <=1.005 <1.035    Ph 5.5 5.0-8.0    Glucose Negative Negative mg/dL    Ketones Negative Negative mg/dL    Protein Negative Negative mg/dL    Bilirubin Negative Negative    Nitrite Negative Negative    Leukocyte Esterase Negative Negative    Occult Blood Negative Negative   APTT   Result Value Ref Range    APTT 26.2 24.7 - 36.0 sec   PROTHROMBIN TIME (INR)   Result Value Ref Range    PT 12.7 12.0 - 14.6 sec    INR 0.97 0.87 - 1.13   ESTIMATED GFR   Result Value Ref Range    GFR If African American >60 >60 mL/min/1.73 m 2    GFR If Non African American >60 >60 mL/min/1.73 m 2         COURSE & MEDICAL DECISION MAKING  Pertinent Labs & Imaging studies reviewed. (See chart for details)    The patient presents today with a left inguinal hernia. This appears to be incarcerated. However there is no evidence of spiculation or obstruction. The patient would benefit from surgical repair. I spoke with the on-call general surgeon Dr. Peña who plans to take the patient to surgery this afternoon.    FINAL IMPRESSION  1.  Incarcerated left inguinal hernia            Electronically signed by: Issa Morton, 4/13/2017 1:16 PM

## 2017-04-13 NOTE — PROGRESS NOTES
Pt seen  H&P dictated  Incarcerated left inguinal hernia  To OR for repair   Discussed risks and benefits

## 2017-04-13 NOTE — PROGRESS NOTES
Pt Ron Haynes admitted to room 202-2 via transport in [facility] from ER at 1500.  Pt A&Ox4 .vs pain reported at 0 on a scale of 0-10. Oriented to room call light and smoking policy.  Reviewed plan of care (equipment, incentive spirometer, sequential compression devices, medications, activity, diet, fall precautions, skin care, and pain) with patient and family.

## 2017-04-13 NOTE — IP AVS SNAPSHOT
[employer] Access Code: [email or web address]  Expires: 5/13/2017  9:09 PM    [employer]  A secure, online tool to manage your health information     [employer]® is a secure, online tool that connects you to your personalized health information from the privacy of your home -- day or night - making it very easy for you to manage your healthcare. Once the activation process is completed, you can even access your medical information using the [employer] rgahu, which is available for free in the [employer]hu store or Google Play store.     [employer] provides the following levels of access (as shown below):   My Chart Features   [facility] Primary Care Doctor [facility]  Specialists [facility]  Urgent  Care Non-[facility]  Primary Care  Doctor   Email your healthcare team securely and privately 24/7 X X X X   Manage appointments: schedule your next appointment; view details of past/upcoming appointments X      Request prescription refills. X      View recent personal medical records, including lab and immunizations X X X X   View health record, including health history, allergies, medications X X X X   Read reports about your outpatient visits, procedures, consult and ER notes X X X X   See your discharge summary, which is a recap of your hospital and/or ER visit that includes your diagnosis, lab results, and care plan. X X       How to register for [employer]:  1. Go to  [email or web address].  2. Click on the Sign Up Now box, which takes you to the New Member Sign Up page. You will need to provide the following information:  a. Enter your [employer] Access Code exactly as it appears at the top of this page. (You will not need to use this code after you’ve completed the sign-up process. If you do not sign up before the expiration date, you must request a new code.)   b. Enter your date of birth.   c. Enter your home email address.   d. Click Submit, and follow the next screen’s instructions.  3. Create a [employer] Collaborative ID. This will be your [employer] Collaborative  login ID and cannot be changed, so think of one that is secure and easy to remember.  4. Create a [employer] password. You can change your password at any time.  5. Enter your Password Reset Question and Answer. This can be used at a later time if you forget your password.   6. Enter your e-mail address. This allows you to receive e-mail notifications when new information is available in [employer].  7. Click Sign Up. You can now view your health information.    For assistance activating your [employer] account, call [phone]

## 2017-04-14 NOTE — PROGRESS NOTES
Patient has ambulated, voided 550 ml, tolerated dinner and without nausea, VSS, remains in no pain. Discharge instructions discussed and given to patient, verbalized understanding and no further instructions. PIV removed. Patient is escorted by BROOK Alba out of the building in stable condition.

## 2017-04-14 NOTE — OR SURGEON
Immediate Post-Operative Note      PreOp Diagnosis: incarcerated left inguinal hernia  PostOp Diagnosis: incarcerated left inguinal hernia    Procedure(s):  INGUINAL HERNIA REPAIR- OPEN - Wound Class: Clean    Surgeon(s):  Selvin Peña M.D.    Anesthesiologist/Type of Anesthesia:  Anesthesiologist: New Vargas M.D./General    Surgical Staff:  Circulator: Lorrie Ramirez R.N.  Scrub Person: Andrea Garcia    Specimen: none    Estimated Blood Loss: 25 cc    Findings: incarcerated direct left inguinal hernia    Complications: none        4/13/2017 5:54 PM Selvin Peña

## 2017-04-14 NOTE — PROGRESS NOTES
Patient arrived from PACU. Awake and oriented x4. In RA with good saturation. No c/o pain or nausea at this time. Sipping water at this time and tolerating it. Initial VSS. Dressing dry and intact with minimal serosanguinous drainage. POC discussed with patient and family especially regarding d/c that patient has to meet criteria, verbalized understanding. Bed low and locked, call light within reach. Fall precautions in effect. Hourly rounding in place.

## 2017-04-14 NOTE — H&P
HISTORY OF PRESENT ILLNESS:  The patient is a 61-year-old man who presented to   the emergency room for further evaluation of pain in his left groin and what   he believes is a left incarcerated hernia.  He has had a palpable lump there,   which is painful over the past 3 days.  He has not had any nausea or vomiting.    He has been able to eat.  The pain; however, has been persistent and perhaps   slightly worse.  I did not know he had a hernia on that side prior to this   acute presentation.    PAST MEDICAL HISTORY:  Significant for hypertension.    PAST SURGICAL HISTORY:  He has had a right inguinal hernia repair.  He has had   repair of an incisional hernia from a cholecystectomy and he has had a   laparoscopic cholecystectomy.    MEDICATIONS:  Prior to admission include hydrochlorothiazide and enalapril.    He also takes Pepcid.    ALLERGIES:  HE HAS AN ALLERGY TO OXYCODONE.    SOCIAL HISTORY:  He does not drink or smoke.    FAMILY HISTORY:  Essentially negative.    REVIEW OF SYSTEMS:  Good health prior to 3 days ago when he developed this   lump and pain.    PHYSICAL EXAMINATION:  VITAL SIGNS:  He has a temperature of 36.6, pulse 68, and blood pressure   116/90.  HEENT:  Generally unremarkable.  His pupils are equal.  His oropharynx without   lesions.  NECK:  Supple.  LUNGS:  Clear.  CARDIOVASCULAR:  Reveals regular rate and rhythm.  ABDOMEN:  Soft and nontender.  He does have what is clinically an incarcerated   hernia in his left groin, tender to palpation and not reducible.  No palpable   hernia in his right groin.  EXTREMITIES:  Symmetrical.  SKIN:  Warm and dry.  NEUROLOGIC:  He is neurologically intact.    LABORATORY DATA:  Includes white count 7.2, hematocrit 47.4, and platelets of   206.  His sodium 137, potassium 3.8, chloride 105, CO2 is 24, BUN is 18, and   creatinine is 1.1.    IMPRESSION:  A 61-year-old man with an incarcerated hernia, which does not   appear to have incarcerated bowel.  He has had  this for 3 days and has no   symptoms of obstruction and his pain is relatively mild.  However, he does   feel that is increasing and is become increasingly symptomatic.  He most   likely has incarcerated omentum.  Regardless, he will be taken to the   operating room for repair of this.  I will use open approach given his   previous procedures including his prostatectomy, which I think precludes a   laparoscopic approach in this setting.  I discussed the procedure with him in   detail including the risk of bleeding, infection of hematoma, the potential   for bowel resection if he did indeed have compromised bowel, the use of mesh,   risk of recurrence, risk of vascular intervention, chronic pain.  He   understands all the above.  He does wish to proceed.  We will make   arrangements.       ____________________________________     MD GURU LOPEZ / WILIAN    DD:  04/13/2017 16:30:28  DT:  04/13/2017 19:17:06    D#:  [id number]  Job#:  [id number]

## 2017-04-14 NOTE — DISCHARGE INSTRUCTIONS
Hernia Repair Discharge Instructions:     1. ACTIVITIES: Upon discharge from the hospital, the day of surgery it is requested that you do no significant physical activity and limit mental activities, as you have had sedation. The day after surgery, you may resume activities of daily living, but for four weeks, it is recommended that you do no strenuous activities or heavy lifting (greater than 15 pounds).     2. DRIVING: You may drive whenever you are off pain medications and are able to perform the activities needed to drive, i.e. turning, bending, twisting, etc.     3. WOUND: It is not unusual for patients to experience swelling and even bruising at the hernia repair site. With inguinal hernias, sometimes the bruising and swelling may extend on to the penis or into the scrotum of male patients. This will resolve over the next few days.     4. ICE: please use ice on the wound to decrease the swelling for the first 24 hours and then discontinue.     5. BATHING: The dressing can be removed two days after surgery and the wound can then be wetted in a shower as normal, but avoid submersion in water (tub bath) for at least a week.     6. PAIN MEDICATION: You will be given a prescription for pain medication at discharge. Please take these as directed. It is important to remember not to take medications on an empty stomach as this may cause nausea.     7. BOWEL FUNCTION: After hernia repair, it is not uncommon for patients to experience constipation. This is due to decreasing activity levels as well as pain medications. You may wish to use a stool softener beginning immediately after surgery, and you may or may not need to use a laxative (Milk of Magnesia, Ex-lax; Senokot, etc.) as well.            8 .CALL IF YOU HAVE: (1) Fevers to more than 1010 F, (2) Unusual chest or leg pain, (3) Drainage or  fluid from incision that may be foul smelling, increased tenderness or soreness at the wound or the wound edges are no longer together,redness or swelling at the incision site. Please do not hesitate to call with any other questions.     9. APPOINTMENT: Contact our office at [phone] for a follow-up appointment in 1 to 2 weeks following your procedure.     If you have any additional questions, please do not hesitate to call the office and speak to either myself or the physician on call.     Office address:   [address] [place] [address] [facility] [address]       Selvin Peña M.D.   [phone]       Discharge Instructions    Discharged to home by car with relative. Discharged via wheelchair, hospital escort: Yes.  Special equipment needed: Not Applicable    Be sure to schedule a follow-up appointment with your primary care doctor or any specialists as instructed.     Discharge Plan:   Diet Plan: Discussed  Activity Level: Discussed  Confirmed Follow up Appointment: Patient to Call and Schedule Appointment  Confirmed Symptoms Management: Discussed  Medication Reconciliation Updated: Yes  Influenza Vaccine Indication: Patient Refuses    I understand that a diet low in cholesterol, fat, and sodium is recommended for good health. Unless I have been given specific instructions below for another diet, I accept this instruction as my diet prescription.   Other diet: Regular    Special Instructions: None    · Is patient discharged on Warfarin / Coumadin?   No     · Is patient Post Blood Transfusion?  No    Depression / Suicide Risk    As you are discharged from this [facility] facility, it is important to learn how to keep safe from harming yourself.    Recognize the warning signs:  · Abrupt changes in personality, positive or negative- including increase in energy   · Giving away possessions  · Change in eating patterns- significant weight changes-  positive or negative  · Change in sleeping patterns- unable to sleep  or sleeping all the time   · Unwillingness or inability to communicate  · Depression  · Unusual sadness, discouragement and loneliness  · Talk of wanting to die  · Neglect of personal appearance   · Rebelliousness- reckless behavior  · Withdrawal from people/activities they love  · Confusion- inability to concentrate     If you or a loved one observes any of these behaviors or has concerns about self-harm, here's what you can do:  · Talk about it- your feelings and reasons for harming yourself  · Remove any means that you might use to hurt yourself (examples: pills, rope, extension cords, firearm)  · Get professional help from the community (Mental Health, Substance Abuse, psychological counseling)  · Do not be alone:Call your Safe Contact- someone whom you trust who will be there for you.  · Call your local CRISIS HOTLINE 606-6493 or 193-685-6521  · Call your local Children's Mobile Crisis Response Team [place] (976) 586-5690 or www.Prisync  · Call the toll free National Suicide Prevention Hotlines   · National Suicide Prevention Lifeline 988-991-GZIM (0911)  · National Hope Line Network 800-SUICIDE (463-5949)         Open Hernia Repair, Care After    Refer to this sheet in the next few weeks. These instructions provide you with information on caring for yourself after your procedure. Your health care provider may also give you more specific instructions. Your treatment has been planned according to current medical practices, but problems sometimes occur. Call your health care provider if you have any problems or questions after your procedure.  WHAT TO EXPECT AFTER THE PROCEDURE  After your procedure, it is typical to have the following:  · Pain in your abdomen, especially along your incision. You will be given pain medicines to control the pain.  · Constipation. You may be given a stool softener to help prevent this.  HOME CARE INSTRUCTIONS  2. Only take over-the-counter or prescription medicines  as directed by your health care provider.  3. Keep the incision area dry and clean. You may wash the incision area gently with soap and water 48 hours after surgery. Gently blot or dab the incision area dry. Do not take baths, use swimming pools, or use hot tubs for 10 days or until your health care provider approves.  4. Change bandages (dressings) as directed by your health care provider.  5. Continue your normal diet as directed by your health care provider. Eat plenty of fruits and vegetables to help prevent constipation.  6. Drink enough fluids to keep your urine clear or pale yellow. This also helps prevent constipation.  7. Do not drive until your health care provider says it is okay.  8. Do not lift anything heavier than 10 lb (4.5 kg) or play contact sports for 4 weeks or until your health care provider approves.  9. Follow up with your health care provider as directed. Ask your health care provider when to make an appointment to have your stitches (sutures) or staples removed.  SEEK MEDICAL CARE IF:  2. You have increased bleeding coming from the incision site.  3. You have blood in your stool.  4. You have increasing pain in the incision area.  5. You see redness or swelling in the incision area.  6. You have fluid (pus) coming from the incision.  7. You have a fever.  8. You notice a bad smell coming from the incision area or dressing.  SEEK IMMEDIATE MEDICAL CARE IF:  2. You develop a rash.  3. You have chest pain or shortness of breath.  4. You feel lightheaded or feel faint.     This information is not intended to replace advice given to you by your health care provider. Make sure you discuss any questions you have with your health care provider.     Document Released: 07/07/2006 Document Revised: 01/08/2016 Document Reviewed: 07/30/2014  [employer] Interactive Patient Education ©2016 [employer]    Hydromorphone tablets  What is this medicine?  HYDROMORPHONE (raúl droe MOR fone) is a pain reliever. It  is used to treat moderate to severe pain.  This medicine may be used for other purposes; ask your health care provider or pharmacist if you have questions.  COMMON BRAND NAME(S): Elaine  What should I tell my health care provider before I take this medicine?  They need to know if you have any of these conditions:  -brain tumor  -drug abuse or addiction  -head injury  -heart disease  -frequently drink alcohol containing drinks  -kidney disease or problems going to the bathroom  -liver disease  -lung disease, asthma, or breathing problems  -mental problems  -an allergic or unusual reaction to lactose, hydromorphone, other opioid analgesics, other medicines, sulfites, foods, dyes, or preservatives  -pregnant or trying to get pregnant  -breast-feeding  How should I use this medicine?  Take this medicine by mouth with a glass of water. If the medicine upsets your stomach, take it with food or milk. Follow the directions on the prescription label. Do not take more medicine than you are told to take.  Talk to your pediatrician regarding the use of this medicine in children. Special care may be needed.  Overdosage: If you think you have taken too much of this medicine contact a poison control center or emergency room at once.  NOTE: This medicine is only for you. Do not share this medicine with others.  What if I miss a dose?  If you miss a dose, take it as soon as you can. If it is almost time for your next dose, take only that dose. Do not take double or extra doses.  What may interact with this medicine?  -alcohol  -antihistamines for allergy, cough and cold  -medicines for anesthesia  -medicines for depression, anxiety, or psychotic disturbances  -medicines for sleep  -muscle relaxants  -naltrexone  -narcotic medicines (opiates) for pain  -phenothiazines like chlorpromazine, mesoridazine, prochlorperazine, thioridazine  -tramadol  This list may not describe all possible interactions. Give your health care provider a  list of all the medicines, herbs, non-prescription drugs, or dietary supplements you use. Also tell them if you smoke, drink alcohol, or use illegal drugs. Some items may interact with your medicine.  What should I watch for while using this medicine?  Tell your doctor or health care professional if your pain does not go away, if it gets worse, or if you have new or a different type of pain. You may develop tolerance to the medicine. Tolerance means that you will need a higher dose of the medicine for pain relief. Tolerance is normal and is expected if you take this medicine for a long time.  Do not suddenly stop taking your medicine because you may develop a severe reaction. Your body becomes used to the medicine. This does NOT mean you are addicted. Addiction is a behavior related to getting and using a drug for a non-medical reason. If you have pain, you have a medical reason to take pain medicine. Your doctor will tell you how much medicine to take. If your doctor wants you to stop the medicine, the dose will be slowly lowered over time to avoid any side effects.  You may get drowsy or dizzy. Do not drive, use machinery, or do anything that needs mental alertness until you know how this medicine affects you. Do not stand or sit up quickly, especially if you are an older patient. This reduces the risk of dizzy or fainting spells. Alcohol may interfere with the effect of this medicine. Avoid alcoholic drinks.  There are different types of narcotic medicines (opiates) for pain. If you take more than one type at the same time, you may have more side effects. Give your health care provider a list of all medicines you use. Your doctor will tell you how much medicine to take. Do not take more medicine than directed. Call emergency for help if you have problems breathing.  This medicine will cause constipation. Try to have a bowel movement at least every 2 to 3 days. If you do not have a bowel movement for 3 days, call  your doctor or health care professional.  Your mouth may get dry. Chewing sugarless gum or sucking hard candy, and drinking plenty of water may help. Contact your doctor if the problem does not go away or is severe.  What side effects may I notice from receiving this medicine?  Side effects that you should report to your doctor or health care professional as soon as possible:  -allergic reactions like skin rash, itching or hives, swelling of the face, lips, or tongue  -breathing problems  -changes in vision  -confusion  -feeling faint or lightheaded, falls  -seizures  -slow or fast heartbeat  -trouble passing urine or change in the amount of urine  -trouble with balance, talking, walking  Side effects that usually do not require medical attention (report to your doctor or health care professional if they continue or are bothersome):  -difficulty sleeping  -drowsiness  -dry mouth  -flushing  -headache  -itching  -loss of appetite  -nausea, vomiting  This list may not describe all possible side effects. Call your doctor for medical advice about side effects. You may report side effects to FDA at 6-069-FDA-9624.  Where should I keep my medicine?  Keep out of the reach of children. This medicine can be abused. Keep your medicine in a safe place to protect it from theft. Do not share this medicine with anyone. Selling or giving away this medicine is dangerous and against the law.  Store at room temperature between 15 and 30 degrees C (59 and 86 degrees F). Keep container tightly closed. Protect from light.  Discard unused medicine and used packaging carefully. Pets and children can be harmed if they find used or lost packages. Flush any unused medicines down the toilet. Do not use the medicine after the expiration date.  NOTE: This sheet is a summary. It may not cover all possible information. If you have questions about this medicine, talk to your doctor, pharmacist, or health care provider.  © 2014, [employer]. (8/24/2012 3:08:53 PM)

## 2017-04-14 NOTE — OR NURSING
1757 - Patient admitted from OR to PACU awake and cooperative. Left groinn area dressing clean, dry, and intact. Positive CMS LLE. Pain 4/10 and tolerable per patient. Denies nausea. Report from MARGA and Dr. Vargas. VS as noted.     1810 - Awake and cooperative. Pain 2/10 and tolerable. Denies nausea - tolerating ice chips. VS as noted.     1825 - Remains as above. Titrating O2, VS as noted.    1840 - Remains as above. Pain 2/10 and tolerable. Denies nausea. VS as noted.     1855 - Awake and cooperative. Pain 2/10 and very tolerable. Denies nausea - tolerating juice. Dressing remains clean, dry, and intact. VS as noted. Meets criteria for transfer to room. Report called to ALEKSANDRA Juarez. Awaiting assistance for transport to room.     1910 - Remains as above. Transferred via bed to room by 2 RNs.

## 2017-04-14 NOTE — OP REPORT
DATE OF SERVICE:  04/13/2017    SURGEON:  Selvin Peña MD    PREOPERATIVE DIAGNOSIS:  Incarcerated left inguinal hernia.    POSTOPERATIVE DIAGNOSIS:  Incarcerated direct left inguinal hernia.    PROCEDURE PERFORMED:  Open repair of inguinal hernia with mesh.    ANESTHESIA:  General endotracheal anesthesia.     ANESTHESIOLOGIST:  New Vargas MD    INDICATIONS:  A 61-year-old man with a symptomatic an incarcerated left   inguinal hernia.  Repair was indicated.    DESCRIPTION OF PROCEDURE:  Procedure discussed in detail with the patient   including the open approach, the risk of bleeding, infection, abscess, and   hematoma.  I also discussed use of mesh and the risk of recurrence.  Discussed   risk of chronic pain and vascular nerve injury.  He understood all the above   and wished to proceed.  He was placed under anesthesia by Dr. Vargas.  His   lower abdomen and groin prepped with Betadine prep and sterile drapes.    Incision was made several centimeters above the inguinal ligament on the left   side.  Dissection proceeded to the level of external oblique fascia.  This was   then incised, thus exposing the inguinal canal.  An obvious incarcerated   hernia was identified.  The cord was initially difficult to identify, but was   eventually identified and dissected away from surrounding tissue and elevated   with Penrose drain.  Dissection proceeded and the pubic tubercle and Kieran's   ligament were well defined.  The hernia was noted to be a direct hernia.    There was an obstructing band, which was divided.  Subsequent to this, the   incarcerated contents which appeared to be essentially omentum were easily   reducible.  The hernia was then well defined.  A large Prolene hernia system   mesh was then placed with the preperitoneal portion deploying nicely in the   preperitoneal space with the aid of finger dissection.  The onlay portion was   then sutured medially to the pubic tubercle, superiorly several  centimeters   from the edge of the conjoined tendon, laterally sutured underneath the   external oblique fascia.  A notch was placed in the mesh to allow the cord to   lay without tension.  The notch was reapproximated with a stitch that   incorporated the shelving edge of Poupart's ligament.  Once the mesh had been   nicely secured in this fashion, the external oblique was approximated over   this with 2-0 Vicryl sutures, subcutaneous tissue was approximated with 3-0   Vicryl sutures and the skin with a 4-0 Monocryl.  Sterile dressings were   placed.  Patient tolerated the procedure well without apparent complication.    Final counts were reported as correct.       ____________________________________     MD MEGHNA LARA / WILIAN    DD:  04/13/2017 17:59:23  DT:  04/13/2017 20:45:37    D#:  [id number]  Job#:  [id number]

## 2017-05-09 ENCOUNTER — OFFICE VISIT (OUTPATIENT)
Dept: URGENT CARE | Facility: PHYSICIAN GROUP | Age: 62
End: 2017-05-09
Payer: COMMERCIAL

## 2017-05-09 VITALS
DIASTOLIC BLOOD PRESSURE: 60 MMHG | BODY MASS INDEX: 26.92 KG/M2 | WEIGHT: 188 LBS | HEART RATE: 69 BPM | OXYGEN SATURATION: 96 % | SYSTOLIC BLOOD PRESSURE: 102 MMHG | TEMPERATURE: 97.6 F | HEIGHT: 70 IN

## 2017-05-09 DIAGNOSIS — J01.20 ACUTE NON-RECURRENT ETHMOIDAL SINUSITIS: ICD-10-CM

## 2017-05-09 PROCEDURE — 99203 OFFICE O/P NEW LOW 30 MIN: CPT | Performed by: EMERGENCY MEDICINE

## 2017-05-09 RX ORDER — AMOXICILLIN AND CLAVULANATE POTASSIUM 875; 125 MG/1; MG/1
1 TABLET, FILM COATED ORAL 2 TIMES DAILY
Qty: 20 TAB | Refills: 0 | Status: SHIPPED | OUTPATIENT
Start: 2017-05-09 | End: 2017-05-19

## 2017-05-09 ASSESSMENT — ENCOUNTER SYMPTOMS
EYE REDNESS: 0
SORE THROAT: 1
NERVOUS/ANXIOUS: 0
FEVER: 0
COUGH: 1
SPUTUM PRODUCTION: 0
ABDOMINAL PAIN: 0
RHINORRHEA: 1
EYE DISCHARGE: 0
SENSORY CHANGE: 0
SHORTNESS OF BREATH: 0
SWEATS: 0
NECK PAIN: 0
VOMITING: 0
HEADACHES: 0
BACK PAIN: 0
STRIDOR: 0
CHILLS: 0
HEMOPTYSIS: 0
NAUSEA: 0

## 2017-05-09 NOTE — MR AVS SNAPSHOT
"        Ron Haynes   2017 10:15 AM   Office Visit   MRN: [id number]    Department:  [facility]   Dept Phone:  [phone]    Description:  Male : 1955   Provider:  ANA Romero M.D.           Reason for Visit     Nasal Congestion sinus pressure ans pain x 2 weeks, sore throat x 1 week       Allergies as of 2017     Allergen Noted Reactions    Oxycodone 2017   Rash    rash      You were diagnosed with     Acute non-recurrent ethmoidal sinusitis   [[id number]]         Vital Signs     Blood Pressure Pulse Temperature Height Weight Body Mass Index    102/60 mmHg 69 36.4 °C (97.6 °F) 1.778 m (5' 10\") 85.276 kg (188 lb) 26.98 kg/m2    Oxygen Saturation Smoking Status                96% Never Smoker           Basic Information     Date Of Birth Sex Race Ethnicity Preferred Language    1955 Male White Non- English      Problem List              ICD-10-CM Priority Class Noted - Resolved    Hypertension (Chronic) I10   2011 - Present    Hyperlipidemia (Chronic) E78.5   2011 - Present    Incarcerated hernia K46.0   2017 - Present      Health Maintenance        Date Due Completion Dates    IMM DTaP/Tdap/Td Vaccine (1 - Tdap) 1976 ---    COLONOSCOPY 2007 ---            Current Immunizations     Influenza Vaccine Quad Inj (Pf) 2016  1:09 PM      Below and/or attached are the medications your provider expects you to take. Review all of your home medications and newly ordered medications with your provider and/or pharmacist. Follow medication instructions as directed by your provider and/or pharmacist. Please keep your medication list with you and share with your provider. Update the information when medications are discontinued, doses are changed, or new medications (including over-the-counter products) are added; and carry medication information at all times in the event of emergency situations     Allergies:  OXYCODONE - Rash               Medications  Valid " as of: May 09, 2017 - 10:47 AM    Generic Name Brand Name Tablet Size Instructions for use    Amoxicillin-Pot Clavulanate (Tab) AUGMENTIN 875-125 MG Take 1 Tab by mouth 2 times a day for 10 days.        Enalapril Maleate (Tab) VASOTEC 10 MG TAKE 1 TAB BY MOUTH 2 TIMES A DAY.        Famotidine (Tab) PEPCID 20 MG Take 20 mg by mouth every bedtime.        HydroCHLOROthiazide (Cap) MICROZIDE 12.5 MG TAKE 1 CAP BY MOUTH EVERY DAY.        HYDROmorphone HCl (Tab) DILAUDID 2 MG Take 1-2 Tabs by mouth every 3 hours as needed for Severe Pain.        Multiple Vitamin (Tab) THERAGRAN  Take 1 Tab by mouth every day.        .                 Medicines prescribed today were sent to:     [address] [address]    [facility] [facility] [place] [place] [address]    Phone: [phone] Fax: [phone]    Open 24 Hours?: No      Medication refill instructions:       If your prescription bottle indicates you have medication refills left, it is not necessary to call your provider’s office. Please contact your pharmacy and they will refill your medication.    If your prescription bottle indicates you do not have any refills left, you may request refills at any time through one of the following ways: The online [employer] system (except Urgent Care), by calling your provider’s office, or by asking your pharmacy to contact your provider’s office with a refill request. Medication refills are processed only during regular business hours and may not be available until the next business day. Your provider may request additional information or to have a follow-up visit with you prior to refilling your medication.   *Please Note: Medication refills are assigned a new Rx number when refilled electronically. Your pharmacy may indicate that no refills were authorized even though a new prescription for the same medication is available at the pharmacy. Please request the medicine by name with the pharmacy before contacting your  provider for a refill.           TripGems Access Code: [email or web address]  Expires: 5/13/2017  9:09 PM    TripGems  A secure, online tool to manage your health information     [employer]’s TripGems® is a secure, online tool that connects you to your personalized health information from the privacy of your home -- day or night - making it very easy for you to manage your healthcare. Once the activation process is completed, you can even access your medical information using the TripGems raghu, which is available for free in the Apple Raghu store or Google Play store.     TripGems provides the following levels of access (as shown below):   My Chart Features   [facility] Primary Care Doctor [facility]  Specialists [facility]  Urgent  Care Non-[facility]  Primary Care  Doctor   Email your healthcare team securely and privately 24/7 X X X    Manage appointments: schedule your next appointment; view details of past/upcoming appointments X      Request prescription refills. X      View recent personal medical records, including lab and immunizations X X X X   View health record, including health history, allergies, medications X X X X   Read reports about your outpatient visits, procedures, consult and ER notes X X X X   See your discharge summary, which is a recap of your hospital and/or ER visit that includes your diagnosis, lab results, and care plan. X X       How to register for TripGems:  1. Go to  [email or web address].  2. Click on the Sign Up Now box, which takes you to the New Member Sign Up page. You will need to provide the following information:  a. Enter your TripGems Access Code exactly as it appears at the top of this page. (You will not need to use this code after you’ve completed the sign-up process. If you do not sign up before the expiration date, you must request a new code.)   b. Enter your date of birth.   c. Enter your home email address.   d. Click Submit, and follow the next screen’s instructions.  3. Create a  MyChart ID. This will be your Knowablet login ID and cannot be changed, so think of one that is secure and easy to remember.  4. Create a Talking Layers password. You can change your password at any time.  5. Enter your Password Reset Question and Answer. This can be used at a later time if you forget your password.   6. Enter your e-mail address. This allows you to receive e-mail notifications when new information is available in Talking Layers.  7. Click Sign Up. You can now view your health information.    For assistance activating your Talking Layers account, call [phone]

## 2017-05-09 NOTE — PROGRESS NOTES
Subjective:      Ron Haynes is a 61 y.o. male who presents with Nasal Congestion            Cough  This is a new problem. The current episode started 1 to 4 weeks ago. The problem occurs constantly. The cough is productive of purulent sputum. Associated symptoms include nasal congestion, postnasal drip, rhinorrhea and a sore throat. Pertinent negatives include no chest pain, chills, eye redness, fever, headaches, hemoptysis, rash, shortness of breath or sweats. Nothing aggravates the symptoms. Treatments tried: DayQuil. The treatment provided mild relief. There is no history of asthma or bronchitis.     Allergies   Allergen Reactions   • Oxycodone Rash     rash     Social History     Social History   • Marital Status:      Spouse Name: N/A   • Number of Children: N/A   • Years of Education: N/A     Occupational History   • Not on file.     Social History Main Topics   • Smoking status: Never Smoker    • Smokeless tobacco: Never Used   • Alcohol Use: 0.5 - 3.5 oz/week     1-7 Standard drinks or equivalent per week      Comment: rare   • Drug Use: No   • Sexual Activity:     Partners: Female     Other Topics Concern   • Not on file     Social History Narrative     Past Medical History   Diagnosis Date   • Allergy    • Cancer (CMS-[facility])    • Hypertension    • Migraine      Current Outpatient Prescriptions on File Prior to Visit   Medication Sig Dispense Refill   • famotidine (PEPCID) 20 MG Tab Take 20 mg by mouth every bedtime.     • multivitamin (THERAGRAN) Tab Take 1 Tab by mouth every day.     • hydrochlorothiazide (MICROZIDE) 12.5 MG capsule TAKE 1 CAP BY MOUTH EVERY DAY. 30 Cap 0   • enalapril (VASOTEC) 10 MG TABS TAKE 1 TAB BY MOUTH 2 TIMES A DAY. 30 Each 0   • HYDROmorphone (DILAUDID) 2 MG Tab Take 1-2 Tabs by mouth every 3 hours as needed for Severe Pain. 30 Tab 0     No current facility-administered medications on file prior to visit.     Family History   Problem Relation Age of Onset   • Hypertension  "Mother    • Lung Disease Father    • Cancer Father    • Prostate cancer Father    • Thyroid Father    • Hypertension Father    • Allergies Father    • Arthritis Father      Review of Systems   Constitutional: Negative for fever and chills.   HENT: Positive for congestion, nosebleeds (patient suffers from bloody discharge and no active bleeding), postnasal drip, rhinorrhea and sore throat. Negative for ear discharge.    Eyes: Negative for discharge and redness.   Respiratory: Positive for cough. Negative for hemoptysis, sputum production, shortness of breath and stridor.    Cardiovascular: Negative for chest pain.   Gastrointestinal: Negative for nausea, vomiting and abdominal pain.   Musculoskeletal: Negative for back pain and neck pain.   Skin: Negative for rash.   Neurological: Negative for sensory change and headaches.   Psychiatric/Behavioral: The patient is not nervous/anxious.           Objective:     /60 mmHg  Pulse 69  Temp(Src) 36.4 °C (97.6 °F)  Ht 1.778 m (5' 10\")  Wt 85.276 kg (188 lb)  BMI 26.98 kg/m2  SpO2 96%     Physical Exam   Constitutional: He appears well-nourished. No distress.   HENT:   Head: Normocephalic and atraumatic.   Right Ear: External ear normal.   Left Ear: External ear normal.   Patient has tenderness over his ethmoid sinuses.   Eyes: Right eye exhibits no discharge. Left eye exhibits no discharge.   Neck: Normal range of motion. Neck supple. No tracheal deviation present.   Pharynx is diffusely inflamed no exudate no peritonsillar or retropharyngeal swelling.   Cardiovascular: Normal rate and regular rhythm.    Pulmonary/Chest: Effort normal and breath sounds normal. No stridor.   Abdominal: He exhibits no distension. There is no tenderness.   No CVAT.   Musculoskeletal: Normal range of motion.   Lymphadenopathy:     He has no cervical adenopathy.   Neurological: He is alert.   Skin: Skin is warm and dry. No rash noted. He is not diaphoretic. No erythema.   Psychiatric: " He has a normal mood and affect. His behavior is normal.   Vitals reviewed.              Assessment/Plan:     DX  Ethmoid Sinusitis           Presumptive strep    I am recommending the patient initiate/ continue hydration efforts including the use of a vaporizer/humidifier/ netti pot. I also recommend the pt, initiate Mucinex (if older than 4) Sudafed or Dayquil if not hypertensive. In addition the patient will initiate the prescribed prescription medication/s: Augmentin If the patient's condition exacerbates with worsening dysphagia, shortness of breath, uncontrolled fever, headache or chest pressure he/she will return immediately to the urgent care or go to  the emergency department for further evaluation.    ANA Romero

## 2017-05-18 NOTE — H&P
HISTORY OF PRESENT ILLNESS:  The patient is a 61-year-old man who presented to   the emergency room for further evaluation of pain in his left groin and what   he believes is a left incarcerated hernia.  He has had a palpable lump there,   which is painful over the past 3 days.  He has not had any nausea or vomiting.    He has been able to eat.  The pain; however, has been persistent and perhaps   slightly worse.  I did not know he had a hernia on that side prior to this   acute presentation.    PAST MEDICAL HISTORY:  Significant for hypertension.    PAST SURGICAL HISTORY:  He has had a right inguinal hernia repair.  He has had   repair of an incisional hernia from a cholecystectomy and he has had a   laparoscopic cholecystectomy.    MEDICATIONS:  Prior to admission include hydrochlorothiazide and enalapril.    He also takes Pepcid.    ALLERGIES:  HE HAS AN ALLERGY TO OXYCODONE.    SOCIAL HISTORY:  He does not drink or smoke.    FAMILY HISTORY:  Essentially negative.    REVIEW OF SYSTEMS:  Good health prior to 3 days ago when he developed this   lump and pain.    PHYSICAL EXAMINATION:  VITAL SIGNS:  He has a temperature of 36.6, pulse 68, and blood pressure   116/90.  HEENT:  Generally unremarkable.  His pupils are equal.  His oropharynx without   lesions.  NECK:  Supple.  LUNGS:  Clear.  CARDIOVASCULAR:  Reveals regular rate and rhythm.  ABDOMEN:  Soft and nontender.  He does have what is clinically an incarcerated   hernia in his left groin, tender to palpation and not reducible.  No palpable   hernia in his right groin.  EXTREMITIES:  Symmetrical.  SKIN:  Warm and dry.  NEUROLOGIC:  He is neurologically intact.    LABORATORY DATA:  Includes white count 7.2, hematocrit 47.4, and platelets of   206.  His sodium 137, potassium 3.8, chloride 105, CO2 is 24, BUN is 18, and   creatinine is 1.1.    IMPRESSION:  A 61-year-old man with an incarcerated hernia, which does not   appear to have incarcerated bowel.  He has had  this for 3 days and has no   symptoms of obstruction and his pain is relatively mild.  However, he does   feel that is increasing and is become increasingly symptomatic.  He most   likely has incarcerated omentum.  Regardless, he will be taken to the   operating room for repair of this.  I will use open approach given his   previous procedures including his prostatectomy, which I think precludes a   laparoscopic approach in this setting.  I discussed the procedure with him in   detail including the risk of bleeding, infection of hematoma, the potential   for bowel resection if he did indeed have compromised bowel, the use of mesh,   risk of recurrence, risk of vascular intervention, chronic pain.  He   understands all the above.  He does wish to proceed.  We will make   arrangements.       ____________________________________     MD MEGHNA LARA / WILIAN    DD:  04/13/2017 16:30:28  DT:  04/13/2017 19:17:06    D#:  [id number]  Job#:  [id number]

## 2017-10-04 ENCOUNTER — OFFICE VISIT (OUTPATIENT)
Dept: URGENT CARE | Facility: CLINIC | Age: 62
End: 2017-10-04
Payer: COMMERCIAL

## 2017-10-04 VITALS
OXYGEN SATURATION: 100 % | DIASTOLIC BLOOD PRESSURE: 86 MMHG | RESPIRATION RATE: 16 BRPM | WEIGHT: 190 LBS | BODY MASS INDEX: 27.2 KG/M2 | SYSTOLIC BLOOD PRESSURE: 124 MMHG | TEMPERATURE: 96.9 F | HEART RATE: 84 BPM | HEIGHT: 70 IN

## 2017-10-04 DIAGNOSIS — J40 BRONCHITIS: Primary | ICD-10-CM

## 2017-10-04 DIAGNOSIS — J06.9 URI WITH COUGH AND CONGESTION: ICD-10-CM

## 2017-10-04 DIAGNOSIS — J01.40 ACUTE NON-RECURRENT PANSINUSITIS: ICD-10-CM

## 2017-10-04 PROCEDURE — 99214 OFFICE O/P EST MOD 30 MIN: CPT | Performed by: PHYSICIAN ASSISTANT

## 2017-10-04 RX ORDER — PROMETHAZINE HYDROCHLORIDE AND CODEINE PHOSPHATE 6.25; 1 MG/5ML; MG/5ML
5 SYRUP ORAL 4 TIMES DAILY PRN
Qty: 240 ML | Refills: 0 | Status: SHIPPED | OUTPATIENT
Start: 2017-10-04 | End: 2017-10-18

## 2017-10-04 RX ORDER — DOXYCYCLINE HYCLATE 100 MG
100 TABLET ORAL 2 TIMES DAILY
Qty: 14 TAB | Refills: 0 | Status: SHIPPED | OUTPATIENT
Start: 2017-10-04 | End: 2017-10-11

## 2017-10-04 NOTE — LETTER
October 4, 2017       Patient: Ron Haynes   YOB: 1955   Date of Visit: 10/4/2017         To Whom It May Concern:    It is my medical opinion that Ron Haynes may be excused from work for the dates of 10/5/17-10/6/17.      If you have any questions or concerns, please don't hesitate to call [phone]          Sincerely,          Jeovanny Crowder P.A.-C.  Electronically Signed

## 2017-10-05 NOTE — PATIENT INSTRUCTIONS
Acute Bronchitis  Bronchitis is inflammation of the airways that extend from the windpipe into the lungs (bronchi). The inflammation often causes mucus to develop. This leads to a cough, which is the most common symptom of bronchitis.   In acute bronchitis, the condition usually develops suddenly and goes away over time, usually in a couple weeks. Smoking, allergies, and asthma can make bronchitis worse. Repeated episodes of bronchitis may cause further lung problems.   CAUSES  Acute bronchitis is most often caused by the same virus that causes a cold. The virus can spread from person to person (contagious) through coughing, sneezing, and touching contaminated objects.  SIGNS AND SYMPTOMS   · Cough.    · Fever.    · Coughing up mucus.    · Body aches.    · Chest congestion.    · Chills.    · Shortness of breath.    · Sore throat.    DIAGNOSIS   Acute bronchitis is usually diagnosed through a physical exam. Your health care provider will also ask you questions about your medical history. Tests, such as chest X-rays, are sometimes done to rule out other conditions.   TREATMENT   Acute bronchitis usually goes away in a couple weeks. Oftentimes, no medical treatment is necessary. Medicines are sometimes given for relief of fever or cough. Antibiotic medicines are usually not needed but may be prescribed in certain situations. In some cases, an inhaler may be recommended to help reduce shortness of breath and control the cough. A cool mist vaporizer may also be used to help thin bronchial secretions and make it easier to clear the chest.   HOME CARE INSTRUCTIONS  · Get plenty of rest.    · Drink enough fluids to keep your urine clear or pale yellow (unless you have a medical condition that requires fluid restriction). Increasing fluids may help thin your respiratory secretions (sputum) and reduce chest congestion, and it will prevent dehydration.    · Take medicines only as directed by your health care provider.  · If  you were prescribed an antibiotic medicine, finish it all even if you start to feel better.  · Avoid smoking and secondhand smoke. Exposure to cigarette smoke or irritating chemicals will make bronchitis worse. If you are a smoker, consider using nicotine gum or skin patches to help control withdrawal symptoms. Quitting smoking will help your lungs heal faster.    · Reduce the chances of another bout of acute bronchitis by washing your hands frequently, avoiding people with cold symptoms, and trying not to touch your hands to your mouth, nose, or eyes.    · Keep all follow-up visits as directed by your health care provider.    SEEK MEDICAL CARE IF:  Your symptoms do not improve after 1 week of treatment.   SEEK IMMEDIATE MEDICAL CARE IF:  · You develop an increased fever or chills.    · You have chest pain.    · You have severe shortness of breath.  · You have bloody sputum.    · You develop dehydration.  · You faint or repeatedly feel like you are going to pass out.  · You develop repeated vomiting.  · You develop a severe headache.  MAKE SURE YOU:   · Understand these instructions.  · Will watch your condition.  · Will get help right away if you are not doing well or get worse.     This information is not intended to replace advice given to you by your health care provider. Make sure you discuss any questions you have with your health care provider.     Document Released: 01/25/2006 Document Revised: 01/08/2016 Document Reviewed: 06/10/2014  [employer] Patient Education ©2016 [employer]

## 2017-10-05 NOTE — PROGRESS NOTES
Subjective:      Pt is a 62 y.o. male who presents with uri          HPI  PT presents to  clinic today complaining of sore throat, pressure in ears, cough, fatigue, runny nose, wheezing and SOB. PT denies CP, NVD, abdominal pain, joint pain. PT states these symptoms began around 3 weeks ago and that the pt's family has been sick on and off for the last week. Pt has not taken any RX medications for this condition. PT states the pain is a 5/10 with coughing, aching in nature and worse at night. The pt's medication list, problem list, and allergies have been evaluated and reviewed during today's visit.    PMH:  Past Medical History:   Diagnosis Date   • Allergy    • Cancer (CMS-HCC)    • Hypertension    • Migraine        PSH:  Past Surgical History:   Procedure Laterality Date   • INGUINAL HERNIA REPAIR Left 2017    Procedure: INGUINAL HERNIA REPAIR- OPEN;  Surgeon: Selvin Peña M.D.;  Location: SURGERY [facility];  Service:    • CHOLECYSTECTOMY     • HERNIA REPAIR     • PROSTATECTOMY, RADIAL         Fam Hx:    family history includes Allergies in his father; Arthritis in his father; Cancer in his father; Hypertension in his father and mother; Lung Disease in his father; Prostate cancer in his father; Thyroid in his father.  Family Status   Relation Status   • Mother Alive   • Father        Soc HX:  Social History     Social History   • Marital status:      Spouse name: N/A   • Number of children: N/A   • Years of education: N/A     Occupational History   • Not on file.     Social History Main Topics   • Smoking status: Never Smoker   • Smokeless tobacco: Never Used   • Alcohol use 0.5 - 3.5 oz/week     1 - 7 Standard drinks or equivalent per week      Comment: rare   • Drug use: No   • Sexual activity: Yes     Partners: Female     Other Topics Concern   • Not on file     Social History Narrative   • No narrative on file         Medications:    Current Outpatient Prescriptions:   •   "doxycycline (VIBRAMYCIN) 100 MG Tab, Take 1 Tab by mouth 2 times a day for 7 days., Disp: 14 Tab, Rfl: 0  •  promethazine-codeine (PHENERGAN-CODEINE) 6.25-10 MG/5ML Syrup, Take 5 mL by mouth 4 times a day as needed for up to 14 days., Disp: 240 mL, Rfl: 0  •  hydrochlorothiazide (MICROZIDE) 12.5 MG capsule, TAKE 1 CAP BY MOUTH EVERY DAY., Disp: 30 Cap, Rfl: 0  •  enalapril (VASOTEC) 10 MG TABS, TAKE 1 TAB BY MOUTH 2 TIMES A DAY., Disp: 30 Each, Rfl: 0  •  famotidine (PEPCID) 20 MG Tab, Take 20 mg by mouth every bedtime., Disp: , Rfl:   •  multivitamin (THERAGRAN) Tab, Take 1 Tab by mouth every day., Disp: , Rfl:   •  HYDROmorphone (DILAUDID) 2 MG Tab, Take 1-2 Tabs by mouth every 3 hours as needed for Severe Pain., Disp: 30 Tab, Rfl: 0      Allergies:  Oxycodone    ROS  Review of Systems   Constitutional: Positive for chills and malaise/fatigue. Negative for fever and diaphoresis.   HENT: Positive for congestion, ear pain and sore throat. Negative for ear discharge, hearing loss, nosebleeds and tinnitus.    Eyes: Negative for blurred vision, double vision and photophobia.   Respiratory: Positive for cough, sputum production, shortness of breath and wheezing. Negative for hemoptysis.    Cardiovascular: Negative for chest pain and palpitations.   Gastrointestinal: Negative for nausea, vomiting, abdominal pain, diarrhea and constipation.   Genitourinary: Negative for dysuria and flank pain.   Musculoskeletal: Negative for joint pain and myalgias.   Skin: Negative for itching and rash.   Neurological: Positive for headaches. Negative for dizziness, tingling and weakness.   Endo/Heme/Allergies: Does not bruise/bleed easily.   Psychiatric/Behavioral: Negative for depression. The patient is not nervous/anxious.             Objective:     /86   Pulse 84   Temp 36.1 °C (96.9 °F)   Resp 16   Ht 1.778 m (5' 10\")   Wt 86.2 kg (190 lb)   SpO2 100%   BMI 27.26 kg/m²      Physical Exam      Physical Exam "   Constitutional: PT is oriented to person, place, and time. PT appears well-developed and well-nourished. No distress.   HENT:   Head: Normocephalic and atraumatic.   Right Ear: Hearing, tympanic membrane, external ear and ear canal normal.   Left Ear: Hearing, tympanic membrane, external ear and ear canal normal.   Nose: Mucosal edema, rhinorrhea and sinus tenderness present. Right sinus exhibits frontal sinus tenderness. Left sinus exhibits frontal sinus tenderness.   Mouth/Throat: Uvula is midline. Mucous membranes are pale. Posterior oropharyngeal edema and posterior oropharyngeal erythema present. No oropharyngeal exudate.   Eyes: Conjunctivae normal and EOM are normal. Pupils are equal, round, and reactive to light. Right eye exhibits no discharge. Left eye exhibits no discharge.   Neck: Normal range of motion. Neck supple. No thyromegaly present.   Cardiovascular: Normal rate, regular rhythm, normal heart sounds and intact distal pulses.  Exam reveals no gallop and no friction rub.    No murmur heard.  Pulmonary/Chest: Effort normal. No respiratory distress. PT has wheezes. PT has no rales. PT exhibits tenderness.   Abdominal: Soft. Bowel sounds are normal. PT exhibits no distension and no mass. There is no tenderness. There is no rebound and no guarding.   Musculoskeletal: Normal range of motion. PT exhibits no edema and no tenderness.   Lymphadenopathy:     PT has no cervical adenopathy.   Neurological: Pt is alert and oriented to person, place, and time. Pt has normal reflexes. No cranial nerve deficit.   Skin: Skin is warm and dry. No rash noted. No erythema.   Psychiatric: PT has a normal mood and affect. Pt behavior is normal. Judgment and thought content normal.          Assessment/Plan:     1. Bronchitis    - doxycycline (VIBRAMYCIN) 100 MG Tab; Take 1 Tab by mouth 2 times a day for 7 days.  Dispense: 14 Tab; Refill: 0    2. URI with cough and congestion    - promethazine-codeine (PHENERGAN-CODEINE)  6.25-10 MG/5ML Syrup; Take 5 mL by mouth 4 times a day as needed for up to 14 days.  Dispense: 240 mL; Refill: 0    3. Acute non-recurrent pansinusitis    - doxycycline (VIBRAMYCIN) 100 MG Tab; Take 1 Tab by mouth 2 times a day for 7 days.  Dispense: 14 Tab; Refill: 0    Rest, fluids encouraged.  OTC decongestant for congestion/cough  Note given for work.  AVS with medical info given.  Pt was in full understanding and agreement with the plan.  Follow-up as needed if symptoms worsen or fail to improve.

## 2017-12-22 ENCOUNTER — HOSPITAL ENCOUNTER (OUTPATIENT)
Facility: MEDICAL CENTER | Age: 62
End: 2017-12-22
Attending: UROLOGY
Payer: COMMERCIAL

## 2017-12-22 LAB
T3 SERPL-MCNC: 178.4 NG/DL (ref 60–181)
T4 SERPL-MCNC: 7.1 UG/DL (ref 4–12)
TESTOST SERPL-MCNC: 323 NG/DL (ref 175–781)
TSH SERPL DL<=0.005 MIU/L-ACNC: 0.46 UIU/ML (ref 0.38–5.33)

## 2017-12-22 PROCEDURE — 84480 ASSAY TRIIODOTHYRONINE (T3): CPT

## 2017-12-22 PROCEDURE — 84436 ASSAY OF TOTAL THYROXINE: CPT

## 2017-12-22 PROCEDURE — 84443 ASSAY THYROID STIM HORMONE: CPT

## 2017-12-22 PROCEDURE — 84403 ASSAY OF TOTAL TESTOSTERONE: CPT

## 2018-07-03 ENCOUNTER — APPOINTMENT (OUTPATIENT)
Dept: RADIOLOGY | Facility: MEDICAL CENTER | Age: 63
End: 2018-07-03
Attending: ORTHOPAEDIC SURGERY
Payer: COMMERCIAL

## 2018-07-03 DIAGNOSIS — M17.11 OSTEOARTHRITIS OF RIGHT KNEE, UNSPECIFIED OSTEOARTHRITIS TYPE: ICD-10-CM

## 2018-07-03 PROCEDURE — 73721 MRI JNT OF LWR EXTRE W/O DYE: CPT | Mod: RT

## 2018-07-13 ENCOUNTER — APPOINTMENT (OUTPATIENT)
Dept: RADIOLOGY | Facility: MEDICAL CENTER | Age: 63
End: 2018-07-13
Attending: UROLOGY
Payer: COMMERCIAL

## 2018-07-13 ENCOUNTER — HOSPITAL ENCOUNTER (OUTPATIENT)
Facility: MEDICAL CENTER | Age: 63
End: 2018-07-13
Attending: UROLOGY | Admitting: UROLOGY
Payer: COMMERCIAL

## 2018-07-13 VITALS
TEMPERATURE: 97.2 F | SYSTOLIC BLOOD PRESSURE: 111 MMHG | BODY MASS INDEX: 26.92 KG/M2 | WEIGHT: 188.05 LBS | RESPIRATION RATE: 17 BRPM | HEART RATE: 65 BPM | DIASTOLIC BLOOD PRESSURE: 81 MMHG | OXYGEN SATURATION: 96 % | HEIGHT: 70 IN

## 2018-07-13 DIAGNOSIS — C61 PROSTATE CANCER (HCC): ICD-10-CM

## 2018-07-13 LAB
INR PPP: 0.96 (ref 0.87–1.13)
PLATELET # BLD AUTO: 198 K/UL (ref 164–446)
PROTHROMBIN TIME: 12.5 SEC (ref 12–14.6)

## 2018-07-13 PROCEDURE — 85610 PROTHROMBIN TIME: CPT

## 2018-07-13 PROCEDURE — 700111 HCHG RX REV CODE 636 W/ 250 OVERRIDE (IP)

## 2018-07-13 PROCEDURE — 700117 HCHG RX CONTRAST REV CODE 255: Performed by: RADIOLOGY

## 2018-07-13 PROCEDURE — 99153 MOD SED SAME PHYS/QHP EA: CPT

## 2018-07-13 PROCEDURE — 160002 HCHG RECOVERY MINUTES (STAT)

## 2018-07-13 PROCEDURE — 85049 AUTOMATED PLATELET COUNT: CPT

## 2018-07-13 RX ORDER — ONDANSETRON 2 MG/ML
4 INJECTION INTRAMUSCULAR; INTRAVENOUS PRN
Status: DISCONTINUED | OUTPATIENT
Start: 2018-07-13 | End: 2018-07-13 | Stop reason: HOSPADM

## 2018-07-13 RX ORDER — MIDAZOLAM HYDROCHLORIDE 1 MG/ML
INJECTION INTRAMUSCULAR; INTRAVENOUS
Status: COMPLETED
Start: 2018-07-13 | End: 2018-07-13

## 2018-07-13 RX ORDER — OXYCODONE HYDROCHLORIDE 10 MG/1
10 TABLET ORAL
Status: DISCONTINUED | OUTPATIENT
Start: 2018-07-13 | End: 2018-07-13

## 2018-07-13 RX ORDER — OXYCODONE HYDROCHLORIDE 5 MG/1
5 TABLET ORAL
Status: DISCONTINUED | OUTPATIENT
Start: 2018-07-13 | End: 2018-07-13

## 2018-07-13 RX ORDER — MIDAZOLAM HYDROCHLORIDE 1 MG/ML
.5-2 INJECTION INTRAMUSCULAR; INTRAVENOUS PRN
Status: DISCONTINUED | OUTPATIENT
Start: 2018-07-13 | End: 2018-07-13 | Stop reason: HOSPADM

## 2018-07-13 RX ORDER — MORPHINE SULFATE 4 MG/ML
4 INJECTION, SOLUTION INTRAMUSCULAR; INTRAVENOUS
Status: DISCONTINUED | OUTPATIENT
Start: 2018-07-13 | End: 2018-07-13 | Stop reason: HOSPADM

## 2018-07-13 RX ORDER — SODIUM CHLORIDE 9 MG/ML
500 INJECTION, SOLUTION INTRAVENOUS
Status: DISCONTINUED | OUTPATIENT
Start: 2018-07-13 | End: 2018-07-13 | Stop reason: HOSPADM

## 2018-07-13 RX ADMIN — MIDAZOLAM 2 MG: 1 INJECTION INTRAMUSCULAR; INTRAVENOUS at 17:45

## 2018-07-13 RX ADMIN — LIDOCAINE HYDROCHLORIDE 100 MG: 20 INJECTION, SOLUTION INTRAVENOUS at 18:07

## 2018-07-13 RX ADMIN — IOHEXOL 16 ML: 300 INJECTION, SOLUTION INTRAVENOUS at 18:07

## 2018-07-13 RX ADMIN — FENTANYL CITRATE 50 MCG: 50 INJECTION, SOLUTION INTRAMUSCULAR; INTRAVENOUS at 17:45

## 2018-07-13 RX ADMIN — MIDAZOLAM HYDROCHLORIDE 2 MG: 1 INJECTION INTRAMUSCULAR; INTRAVENOUS at 17:45

## 2018-07-13 RX ADMIN — MIDAZOLAM HYDROCHLORIDE 2 MG: 1 INJECTION INTRAMUSCULAR; INTRAVENOUS at 17:52

## 2018-07-13 ASSESSMENT — PAIN SCALES - GENERAL
PAINLEVEL_OUTOF10: 0

## 2018-07-13 NOTE — OR SURGEON
Immediate Post- Operative Note        PostOp Diagnosis: Bladder outlet obstruction.       Procedure(s): Suprapubic catheter placement.       Estimated Blood Loss: Less than 5 ml        Complications: None            7/13/2018     4:59 PM     Lobo Carson

## 2018-07-13 NOTE — OR NURSING
1500: Patient updated re: procedure delay due to emergent case. Patient understanding at this time.    1530: Patient updated re: procedure delay. IR called by this RN with no reply.    1545: IR notified this RN that Dr. Carson to consent patient now, between emergent cases, MD to discuss plan with patient at bedside.    1350: Report given to ALEKSANDRA Velásquez.

## 2018-07-14 NOTE — PROGRESS NOTES
IR Procedure Note:    Patient consented in PPU prior to procedure by Dr Carson; all questions answered.  Dr. Carson performed suprapubic catheter placement.  The patient tolerated the procedure well; ETCo2 baseline 36, with consistent waveform during the procedure.  Gauze and medipore tape applied to drain, CDI.  Patient alert and oriented and verbally appropriate post procedure, vital signs stable, see flow sheet.  Patient taken to PPU and bedside report given to ALEKSANDRA Garcia.      Picodeon Flexima APDL 14F x 25cm  REF [id number]  LOT [id number]

## 2018-07-14 NOTE — OR NURSING
1815 patient arrived from IR s/p suprapubic catheter placement patient wide awake, denies pain, s.o.b, n/v, surgical site dressing clean, plan of care discussed with patient agreeable.  1830 patient tolerating oral fluids and snacks  1845 criteria met to discharge patient home  1850 patient educated on how to empty urine bag, received verbal understanding.  1853 discharge instructions given to patient, patient verbalize understanding of the orders, iv discontinued tip intact, currently vss, no c/o cp, s.o.b, surgical site dressing clean. Patient not in any distress.  1900 patient escorted via w/c with all his personal belongings.

## 2018-07-14 NOTE — DISCHARGE INSTRUCTIONS
ACTIVITY: Rest and take it easy for the first 24 hours.  A responsible adult is recommended to remain with you during that time.  It is normal to feel sleepy.  We encourage you to not do anything that requires balance, judgment or coordination.    MILD FLU-LIKE SYMPTOMS ARE NORMAL. YOU MAY EXPERIENCE GENERALIZED MUSCLE ACHES, THROAT IRRITATION, HEADACHE AND/OR SOME NAUSEA.    FOR 24 HOURS DO NOT:  Drive, operate machinery or run household appliances.  Drink beer or alcoholic beverages.   Make important decisions or sign legal documents.    SPECIAL INSTRUCTIONS: follow up with primary care physician as needed  If you experience chest pain, shortness of breath call 911 return to ER  Resume your home medications   See attached discharge instructions for suprapubic catheter care    DIET: To avoid nausea, slowly advance diet as tolerated, avoiding spicy or greasy foods for the first day.  Add more substantial food to your diet according to your physician's instructions.  Babies can be fed formula or breast milk as soon as they are hungry.  INCREASE FLUIDS AND FIBER TO AVOID CONSTIPATION.    SURGICAL DRESSING/BATHING: keep dressing clean dry intact, ok to change dressing as needed.    FOLLOW-UP APPOINTMENT:  A follow-up appointment should be arranged with your doctor in [phone]; call to schedule.    You should CALL YOUR PHYSICIAN if you develop:  Fever greater than 101 degrees F.  Pain not relieved by medication, or persistent nausea or vomiting.  Excessive bleeding (blood soaking through dressing) or unexpected drainage from the wound.  Extreme redness or swelling around the incision site, drainage of pus or foul smelling drainage.  Inability to urinate or empty your bladder within 8 hours.  Problems with breathing or chest pain.    You should call 911 if you develop problems with breathing or chest pain.  If you are unable to contact your doctor or surgical center, you should go to the nearest emergency room or urgent  [facility] center.  Physician's telephone #: [phone]    If any questions arise, call your doctor.  If your doctor is not available, please feel free to call the Surgical Center at [phone]  The Center is open Monday through Friday from 7AM to 7PM.  You can also call the HEALTH HOTLINE open 24 hours/day, 7 days/week and speak to a nurse at [phone], or toll free at [phone].    A registered nurse may call you a few days after your surgery to see how you are doing after your procedure.    MEDICATIONS: Resume taking daily medication.  Take prescribed pain medication with food.  If no medication is prescribed, you may take non-aspirin pain medication if needed.  PAIN MEDICATION CAN BE VERY CONSTIPATING.  Take a stool softener or laxative such as senokot, pericolace, or milk of magnesia if needed.  Suprapubic Catheter Home Guide  A suprapubic catheter is a rubber tube used to drain urine from the bladder into a collection bag. The catheter is inserted into the bladder through a small opening in the in the lower abdomen, near the center of the body, above the pubic bone (suprapubic area). There is a tiny balloon filled with germ-free (sterile) water on the end of the catheter that is in the bladder. The balloon helps to keep the catheter in place.  Your suprapubic catheter may need to be replaced every 4-6 weeks, or as often as recommended by your health care provider. The collection bag must be emptied every day and cleaned every 2-3 days. The collection bag can be put beside your bed at night and attached to your leg during the day. You may have a large collection bag to use at night and a smaller one to use during the day.  What are the risks?  · Urine flow can become blocked. This can happen if the catheter is not working correctly, or if you have a blood clot in your bladder or in the catheter.  · Tissue near the catheter may can become irritated and bleed.  · Bacteria may get into your bladder and cause a  urinary tract infection.  How do I change the catheter?  Supplies needed  · Two pairs of sterile gloves.  · Catheter.  · Two syringes.  · Sterile water.  · Sterile cleaning solution.  · Lubricant.  · Collection bags.  Changing the catheter   To replace your catheter, take the following steps:  1. Drink plenty of fluids during the hours before you plan to change the catheter.  2. Wash your hands with soap and water. If soap and water are not available, use hand .  3. Lie on your back and put on sterile gloves.  4. Clean the skin around the catheter opening using the sterile cleaning solution.  5. Remove the water from the balloon using a syringe.  6. Slowly remove the catheter.  ¨ Do not pull on the catheter if it seems stuck.  ¨ Call your health care provider immediately if you have difficulty removing the catheter.  7. Take off the used gloves, and put on a new pair.  8. Put lubricant on the end of the new catheter that will go into your bladder.  9. Gently slide the catheter through the opening in your abdomen and into your bladder.  10. Wait for some urine to start flowing through the catheter. When urine starts to flow through the catheter, use a new syringe to fill the balloon with sterile water.  11. Attach the collection bag to the end of the catheter. Make sure the connection is tight.  12. Remove the gloves and wash your hands with soap and water.  How do I care for my skin around the catheter?  Use a clean washcloth and soapy water to clean the skin around your catheter every day. Pat the area dry with a clean towel.  · Do not pull on the catheter.  · Do not use ointment or lotion on this area unless told by your health care provider.  · Check your skin around the catheter every day for signs of infection. Check for:  ¨ Redness, swelling, or pain.  ¨ Fluid or blood.  ¨ Warmth.  ¨ Pus or a bad smell.  How do I clean and empty the collection bag?  Clean the collection bag every 2-3 days, or as often  as told by your health care provider. To do this, take the following steps:  · Wash your hands with soap and water. If soap and water are not available, use hand .  · Disconnect the bag from the catheter and immediately attach a new bag to the catheter.  · Empty the used bag completely.  · Clean the used bag using one of the following methods:  ¨ Rinse the used bag with warm water and soap.  ¨ Fill the bag with water and add 1 tsp of vinegar. Let it sit for about 30 minutes, then empty the bag.  · Let the bag dry completely, and put it in a clean plastic bag before storing it.  Empty the large collection bag every 8 hours. Empty the small collection bag when it is about ? full. To empty your large or small collection bag, take the following steps:  · Always keep the bag below the level of the catheter. This keeps urine from flowing backwards into the catheter.  · Hold the bag over the toilet or another container. Turn the valve (spigot) at the bottom of the bag to empty the urine.  ¨ Do not touch the opening of the spigot.  ¨ Do not let the opening touch the toilet or container.  · Close the spigot tightly when the bag is empty.  What are some general tips?  · Always wash your hands before and after caring for your catheter and collection bag. Use a mild, fragrance-free soap. If soap and water are not available, use hand .  · Clean the catheter with soap and water as often as told by your health care provider.  · Always make sure there are no twists or curls (kinks) in the catheter tube.  · Always make sure there are no leaks in the catheter or collection bag.  · Drink enough fluid to keep your urine clear or pale yellow.  · Do not take baths, swim, or use a hot tub.  When should I seek medical care?  Seek medical care if:  · You leak urine.  · You have redness, swelling, or pain around your catheter opening.  · You have fluid or blood coming from your catheter opening.  · Your catheter opening  feels warm to the touch.  · You have pus or a bad smell coming from your catheter opening.  · You have a fever or chills.  · Your urine flow slows down.  · Your urine becomes cloudy or smelly.  When should I seek immediate medical care?  Seek immediate medical care if your catheter comes out, or if you have:  · Nausea.  · Back pain.  · Difficulty changing your catheter.  · Blood in your urine.  · No urine flow for 1 hour.  This information is not intended to replace advice given to you by your health care provider. Make sure you discuss any questions you have with your health care provider.  Document Released: 09/04/2012 Document Revised: 08/16/2017 Document Reviewed: 08/30/2016  [employer] Patient Education © 2017 [employer]      If your physician has prescribed pain medication that includes Acetaminophen (Tylenol), do not take additional Acetaminophen (Tylenol) while taking the prescribed medication.    Depression / Suicide Risk    As you are discharged from this [facility] facility, it is important to learn how to keep safe from harming yourself.    Recognize the warning signs:  · Abrupt changes in personality, positive or negative- including increase in energy   · Giving away possessions  · Change in eating patterns- significant weight changes-  positive or negative  · Change in sleeping patterns- unable to sleep or sleeping all the time   · Unwillingness or inability to communicate  · Depression  · Unusual sadness, discouragement and loneliness  · Talk of wanting to die  · Neglect of personal appearance   · Rebelliousness- reckless behavior  · Withdrawal from people/activities they love  · Confusion- inability to concentrate     If you or a loved one observes any of these behaviors or has concerns about self-harm, here's what you can do:  · Talk about it- your feelings and reasons for harming yourself  · Remove any means that you might use to hurt yourself (examples: pills, rope, extension  cords, firearm)  · Get professional help from the community (Mental Health, Substance Abuse, psychological counseling)  · Do not be alone:Call your Safe Contact- someone whom you trust who will be there for you.  · Call your local CRISIS HOTLINE 791-2716 or 581-513-9677  · Call your local Children's Mobile Crisis Response Team [place] (836) 981-0588 or www.[employer]  · Call the toll free National Suicide Prevention Hotlines   · National Suicide Prevention Lifeline 509-672-BNJS (1770)  · National Hope Line Network 800-SUICIDE (188-2518)

## 2018-09-11 DIAGNOSIS — Z01.810 PRE-OPERATIVE CARDIOVASCULAR EXAMINATION: ICD-10-CM

## 2018-09-11 DIAGNOSIS — Z01.812 PRE-OPERATIVE LABORATORY EXAMINATION: ICD-10-CM

## 2018-09-11 LAB
ANION GAP SERPL CALC-SCNC: 8 MMOL/L (ref 0–11.9)
BUN SERPL-MCNC: 19 MG/DL (ref 8–22)
CALCIUM SERPL-MCNC: 9.8 MG/DL (ref 8.5–10.5)
CHLORIDE SERPL-SCNC: 106 MMOL/L (ref 96–112)
CO2 SERPL-SCNC: 27 MMOL/L (ref 20–33)
CREAT SERPL-MCNC: 1.1 MG/DL (ref 0.5–1.4)
EKG IMPRESSION: NORMAL
GLUCOSE SERPL-MCNC: 96 MG/DL (ref 65–99)
POTASSIUM SERPL-SCNC: 3.6 MMOL/L (ref 3.6–5.5)
SODIUM SERPL-SCNC: 141 MMOL/L (ref 135–145)

## 2018-09-11 PROCEDURE — 80048 BASIC METABOLIC PNL TOTAL CA: CPT

## 2018-09-11 PROCEDURE — 36415 COLL VENOUS BLD VENIPUNCTURE: CPT

## 2018-09-11 PROCEDURE — 93005 ELECTROCARDIOGRAM TRACING: CPT

## 2018-09-11 PROCEDURE — 93010 ELECTROCARDIOGRAM REPORT: CPT | Performed by: INTERNAL MEDICINE

## 2018-09-11 NOTE — OR NURSING
"Pre-admit appointment completed. \"Preparing for your procedure\" sheet given to pt along with verbal and written instructions. Pt instructed to continue regularly prescribed medications through the day before surgery.     Pt given LATHA education due to score of 6 on LATHA screening tool. Enc to follow up with PCP.   "

## 2018-09-13 ENCOUNTER — HOSPITAL ENCOUNTER (OUTPATIENT)
Facility: MEDICAL CENTER | Age: 63
End: 2018-09-13
Attending: ORTHOPAEDIC SURGERY | Admitting: ORTHOPAEDIC SURGERY
Payer: COMMERCIAL

## 2018-09-13 VITALS
HEIGHT: 70 IN | SYSTOLIC BLOOD PRESSURE: 125 MMHG | OXYGEN SATURATION: 96 % | BODY MASS INDEX: 26.92 KG/M2 | WEIGHT: 188.05 LBS | HEART RATE: 65 BPM | DIASTOLIC BLOOD PRESSURE: 86 MMHG | TEMPERATURE: 97.2 F | RESPIRATION RATE: 16 BRPM

## 2018-09-13 PROBLEM — S83.241A ACUTE MEDIAL MENISCUS TEAR, RIGHT, INITIAL ENCOUNTER: Status: ACTIVE | Noted: 2018-09-13

## 2018-09-13 PROCEDURE — 302135 SEQUENTIAL COMPRESSION MACHINE: Performed by: ORTHOPAEDIC SURGERY

## 2018-09-13 PROCEDURE — 160048 HCHG OR STATISTICAL LEVEL 1-5: Performed by: ORTHOPAEDIC SURGERY

## 2018-09-13 PROCEDURE — 700111 HCHG RX REV CODE 636 W/ 250 OVERRIDE (IP)

## 2018-09-13 PROCEDURE — 160002 HCHG RECOVERY MINUTES (STAT): Performed by: ORTHOPAEDIC SURGERY

## 2018-09-13 PROCEDURE — 160041 HCHG SURGERY MINUTES - EA ADDL 1 MIN LEVEL 4: Performed by: ORTHOPAEDIC SURGERY

## 2018-09-13 PROCEDURE — 700105 HCHG RX REV CODE 258: Performed by: ORTHOPAEDIC SURGERY

## 2018-09-13 PROCEDURE — 160046 HCHG PACU - 1ST 60 MINS PHASE II: Performed by: ORTHOPAEDIC SURGERY

## 2018-09-13 PROCEDURE — 700102 HCHG RX REV CODE 250 W/ 637 OVERRIDE(OP)

## 2018-09-13 PROCEDURE — 160009 HCHG ANES TIME/MIN: Performed by: ORTHOPAEDIC SURGERY

## 2018-09-13 PROCEDURE — A6223 GAUZE >16<=48 NO W/SAL W/O B: HCPCS | Performed by: ORTHOPAEDIC SURGERY

## 2018-09-13 PROCEDURE — 160029 HCHG SURGERY MINUTES - 1ST 30 MINS LEVEL 4: Performed by: ORTHOPAEDIC SURGERY

## 2018-09-13 PROCEDURE — 160025 RECOVERY II MINUTES (STATS): Performed by: ORTHOPAEDIC SURGERY

## 2018-09-13 PROCEDURE — 501838 HCHG SUTURE GENERAL: Performed by: ORTHOPAEDIC SURGERY

## 2018-09-13 PROCEDURE — 160035 HCHG PACU - 1ST 60 MINS PHASE I: Performed by: ORTHOPAEDIC SURGERY

## 2018-09-13 PROCEDURE — A9270 NON-COVERED ITEM OR SERVICE: HCPCS

## 2018-09-13 PROCEDURE — 502580 HCHG PACK, KNEE ARTHROSCOPY: Performed by: ORTHOPAEDIC SURGERY

## 2018-09-13 RX ORDER — DEXAMETHASONE SODIUM PHOSPHATE 4 MG/ML
4 INJECTION, SOLUTION INTRA-ARTICULAR; INTRALESIONAL; INTRAMUSCULAR; INTRAVENOUS; SOFT TISSUE
Status: DISCONTINUED | OUTPATIENT
Start: 2018-09-13 | End: 2018-09-13 | Stop reason: HOSPADM

## 2018-09-13 RX ORDER — HYDROMORPHONE HYDROCHLORIDE 2 MG/ML
0.5 INJECTION, SOLUTION INTRAMUSCULAR; INTRAVENOUS; SUBCUTANEOUS
Status: DISCONTINUED | OUTPATIENT
Start: 2018-09-13 | End: 2018-09-13 | Stop reason: HOSPADM

## 2018-09-13 RX ORDER — LIDOCAINE HYDROCHLORIDE 10 MG/ML
INJECTION, SOLUTION EPIDURAL; INFILTRATION; INTRACAUDAL; PERINEURAL
Status: DISCONTINUED
Start: 2018-09-13 | End: 2018-09-13 | Stop reason: HOSPADM

## 2018-09-13 RX ORDER — SCOLOPAMINE TRANSDERMAL SYSTEM 1 MG/1
1 PATCH, EXTENDED RELEASE TRANSDERMAL
Status: DISCONTINUED | OUTPATIENT
Start: 2018-09-13 | End: 2018-09-13 | Stop reason: HOSPADM

## 2018-09-13 RX ORDER — OXYCODONE HYDROCHLORIDE AND ACETAMINOPHEN 5; 325 MG/1; MG/1
2 TABLET ORAL EVERY 4 HOURS PRN
Status: DISCONTINUED | OUTPATIENT
Start: 2018-09-13 | End: 2018-09-13 | Stop reason: HOSPADM

## 2018-09-13 RX ORDER — DIPHENHYDRAMINE HYDROCHLORIDE 50 MG/ML
25 INJECTION INTRAMUSCULAR; INTRAVENOUS EVERY 6 HOURS PRN
Status: DISCONTINUED | OUTPATIENT
Start: 2018-09-13 | End: 2018-09-13 | Stop reason: HOSPADM

## 2018-09-13 RX ORDER — HYDROCODONE BITARTRATE AND ACETAMINOPHEN 7.5; 325 MG/1; MG/1
1 TABLET ORAL EVERY 4 HOURS PRN
Status: DISCONTINUED | OUTPATIENT
Start: 2018-09-13 | End: 2018-09-13 | Stop reason: HOSPADM

## 2018-09-13 RX ORDER — HALOPERIDOL 5 MG/ML
1 INJECTION INTRAMUSCULAR EVERY 6 HOURS PRN
Status: DISCONTINUED | OUTPATIENT
Start: 2018-09-13 | End: 2018-09-13 | Stop reason: HOSPADM

## 2018-09-13 RX ORDER — EPINEPHRINE 1 MG/ML
INJECTION INTRAMUSCULAR; INTRAVENOUS; SUBCUTANEOUS
Status: DISCONTINUED | OUTPATIENT
Start: 2018-09-13 | End: 2018-09-13 | Stop reason: HOSPADM

## 2018-09-13 RX ORDER — SODIUM CHLORIDE, SODIUM LACTATE, POTASSIUM CHLORIDE, CALCIUM CHLORIDE 600; 310; 30; 20 MG/100ML; MG/100ML; MG/100ML; MG/100ML
INJECTION, SOLUTION INTRAVENOUS
Status: DISCONTINUED | OUTPATIENT
Start: 2018-09-13 | End: 2018-09-13 | Stop reason: HOSPADM

## 2018-09-13 RX ORDER — ONDANSETRON 2 MG/ML
4 INJECTION INTRAMUSCULAR; INTRAVENOUS EVERY 4 HOURS PRN
Status: DISCONTINUED | OUTPATIENT
Start: 2018-09-13 | End: 2018-09-13 | Stop reason: HOSPADM

## 2018-09-13 RX ADMIN — HYDROCODONE BITARTRATE AND ACETAMINOPHEN 15 ML: 7.5; 325 SOLUTION ORAL at 14:27

## 2018-09-13 ASSESSMENT — PAIN SCALES - GENERAL
PAINLEVEL_OUTOF10: 4
PAINLEVEL_OUTOF10: 4
PAINLEVEL_OUTOF10: 5
PAINLEVEL_OUTOF10: 5
PAINLEVEL_OUTOF10: 0

## 2018-09-13 NOTE — OP REPORT
DATE OF SERVICE:  09/13/2018    PREOPERATIVE DIAGNOSIS:  Large medial meniscus tear with moderate   osteoarthritis, right knee.    POSTOPERATIVE DIAGNOSIS:  Large medial meniscus tear with moderate   osteoarthritis, right knee.    PROCEDURES:  Examination under anesthesia, arthroscopy, partial medial   meniscectomy and chondroplasty of medial femoral condyle, right knee.    SURGEON:  Olman Baez MD    ANESTHESIA:  General per LMA.    ANESTHESIOLOGIST:  Christopher Irby MD    OPERATIVE INDICATIONS:  The patient is a 63-year-old white male who has had a   history of moderate medial osteoarthritis.  He is not responding with   conservative program and MRI shows a large medial meniscus tear as well.  It   was felt that an arthroscopy was indicated to try and decrease symptoms and   maintain activity in this active gentleman.    PROCEDURE IN DETAIL:  The patient was brought to the operating room and placed   on table in a supine position where a satisfactory general anesthetic agent   was administered per LMA.  The patient was given 2 g of Ancef IV prior to   beginning the procedure.  The right knee revealed a range of 0, 3, 135 with no   pathological laxity about the cruciates or collaterals.  The right lower   extremity was placed in arthroscopic leg castellanos with left lower extremity   supported on pillows.  The right lower extremity was prepped with gel prep,   draped free in a sterile fashion.  A superomedial portal was created and the   inflow cannula was inserted.  There was a moderate amount of clear serous   fluid noted within the joint.  An anterolateral portal was created and a   30-degree arthroscope was inserted.  A comprehensive arthroscopy of the knee   was performed.  Suprapatellar pouch was free of defects.  The undersurface of   the patella and trochlea showed some grade I changes, more on the lateral   patellar facet.  The medial and lateral gutter showed no pathology.  The   medial compartment was  entered.  There was a large complex tear of the medial   meniscus from the posterior horn to the mid medial portion.  There was diffuse   grade III with small areas of grade IV changes on the medial femoral condyle.    The ACL was visualized and noted to be intact.  Lateral compartment was   entered.  Lateral articular surfaces and lateral meniscus were grossly intact.    An anteromedial portal was created and a probe was inserted.  Probing of the   lateral meniscus and ACL revealed no further pathology.  Probing the medial   meniscus revealed this large complex tear with a posteriorly based flap that   was displaced into the joint.  A partial medial meniscectomy was performed   with baskets and shaver, removing approximately 60% of the medial meniscus   from the posterior horn to the mid medial portion.  This was smoothed to a   stable meniscal rim with the shaver.  There was a small horizontal cleavage   component of the posterior horn as well.  The undersurface portion of the   horizontal cleavage tear was removed with basket and smoothed with a shaver.    A chondroplasty performed on the medial femoral condyle, removing loose flaps   of articular cartilage.  This was taken down to a stable cartilaginous base.    The posteromedial and posterolateral compartments were viewed through notch.    No evidence of pathology.  The joint was drained of fluid and a sterile   dressing of Adaptic flats, cast padding, and Ace wraps was applied.  The   patient was awakened, extubated in the operating room and taken to the   recovery room in stable condition.  Sponge and needle counts were correct.    There were no identified intraoperative complications.       ____________________________________     MD KALEY Stern / WILIAN    DD:  09/13/2018 14:02:52  DT:  09/13/2018 14:19:18    D#:  [id number]  Job#:  [id number]

## 2018-09-13 NOTE — OR NURSING
1359 To PACU from OR via gurney, side rails up x 2 for safety, lungs clear bilaterally, scd to RLE on patient and machine operational. Dressing CDI to R knee with ice pack placed and RLE elevated on gurney and pillow used. +2 R pedal pulse with pink/warm toes and <3 sec cap refill.   1410 CMS intact to RLE. Pt reports discomfort 5/10 and tolerable. Denies nausea. Report to [facility].

## 2018-09-13 NOTE — DISCHARGE INSTRUCTIONS
ACTIVITY: Rest and take it easy for the first 24 hours.  A responsible adult is recommended to remain with you during that time.  It is normal to feel sleepy.  We encourage you to not do anything that requires balance, judgment or coordination.    MILD FLU-LIKE SYMPTOMS ARE NORMAL. YOU MAY EXPERIENCE GENERALIZED MUSCLE ACHES, THROAT IRRITATION, HEADACHE AND/OR SOME NAUSEA.    FOR 24 HOURS DO NOT:  Drive, operate machinery or run household appliances.  Drink beer or alcoholic beverages.   Make important decisions or sign legal documents.    SPECIAL INSTRUCTIONS: Ice & elevate. Weight bear as tolerated.     DIET: To avoid nausea, slowly advance diet as tolerated, avoiding spicy or greasy foods for the first day.  Add more substantial food to your diet according to your physician's instructions.  Babies can be fed formula or breast milk as soon as they are hungry.  INCREASE FLUIDS AND FIBER TO AVOID CONSTIPATION.    SURGICAL DRESSING/BATHING: Keep dressing clean, dry and intact until instructed otherwise by surgeon.    FOLLOW-UP APPOINTMENT AS SCHEDULED    You should CALL YOUR PHYSICIAN if you develop:  Fever greater than 101 degrees F.  Pain not relieved by medication, or persistent nausea or vomiting.  Excessive bleeding (blood soaking through dressing) or unexpected drainage from the wound.  Extreme redness or swelling around the incision site, drainage of pus or foul smelling drainage.  Inability to urinate or empty your bladder within 8 hours.  Problems with breathing or chest pain.    You should call 911 if you develop problems with breathing or chest pain.  If you are unable to contact your doctor or surgical center, you should go to the nearest emergency room or urgent care center.    Physician's telephone #: [phone] (Dr Baez)    If any questions arise, call your doctor.  If your doctor is not available, please feel free to call the Surgical Center at [phone].  The Center is open Monday through  Friday from 7AM to 7PM.  You can also call the HEALTH HOTLINE open 24 hours/day, 7 days/week and speak to a nurse at [phone], or toll free at [phone].    A registered nurse may call you a few days after your surgery to see how you are doing after your procedure.    MEDICATIONS: Resume taking daily medication.  Take prescribed pain medication with food.  If no medication is prescribed, you may take non-aspirin pain medication if needed.  PAIN MEDICATION CAN BE VERY CONSTIPATING.  Take a stool softener or laxative such as senokot, pericolace, or milk of magnesia if needed.    Prescription given preop.  Last pain medication given at 2:47 pm (Hydrocodone w/APAP solution 7.5/325 mg).    If your physician has prescribed pain medication that includes Acetaminophen (Tylenol), do not take additional Acetaminophen (Tylenol) while taking the prescribed medication.      Depression / Suicide Risk    As you are discharged from this [facility] facility, it is important to learn how to keep safe from harming yourself.    Recognize the warning signs:  · Abrupt changes in personality, positive or negative- including increase in energy   · Giving away possessions  · Change in eating patterns- significant weight changes-  positive or negative  · Change in sleeping patterns- unable to sleep or sleeping all the time   · Unwillingness or inability to communicate  · Depression  · Unusual sadness, discouragement and loneliness  · Talk of wanting to die  · Neglect of personal appearance   · Rebelliousness- reckless behavior  · Withdrawal from people/activities they love  · Confusion- inability to concentrate     If you or a loved one observes any of these behaviors or has concerns about self-harm, here's what you can do:  · Talk about it- your feelings and reasons for harming yourself  · Remove any means that you might use to hurt yourself (examples: pills, rope, extension cords, firearm)  · Get professional help from the  community (Mental Health, Substance Abuse, psychological counseling)  · Do not be alone:Call your Safe Contact- someone whom you trust who will be there for you.  · Call your local CRISIS HOTLINE 614-1845 or 320-036-6065  · Call your local Children's Mobile Crisis Response Team [place] (269) 482-2935 or www.[employer]  · Call the toll free National Suicide Prevention Hotlines   · National Suicide Prevention Lifeline 185-917-AHBO (7797)  · National Hope Line Network 800-SUICIDE (619-0307)

## 2018-09-13 NOTE — OR NURSING
1410 Assumed care of pt. Pt resting, reports 5/10 tolerable pain to his right knee, denies nausea. Plan to keep pt in PACU for full hour per [facility] protocol.  1425 PO pain medication given. Message left for pt's contact.  1440 Pt reports a decrease in pain level. No complaints.  1455 No changes. Care transferred to RN April, pt transferred to stage 2 recovery.

## 2018-09-13 NOTE — OR NURSING
8277 patient to stage 2  Patient settled in recliner chair post short ambulation from [facility] - pt dressed with assist by RN. +2 R pedal pulse with pink/warm toes. CMS intact to RLE. Pain rated as tolerable and denies nausea. Pulse ox 95% on RA at rest.   1539 D/Norman to care of family post uneventful stay in PACU 2.

## 2018-09-13 NOTE — OR SURGEON
Immediate Post OP Note    PreOp Diagnosis: Right MMT, medial OA    PostOp Diagnosis: same    Procedure(s):  KNEE ARTHROSCOPY - Wound Class: Clean  MEDIAL MENISCECTOMY - PARTIAL, KEITH - Wound Class: Clean    Surgeon(s):  Olman Baez M.D.    Anesthesiologist/Type of Anesthesia:  Anesthesiologist: Christopher Irby M.D./General    Surgical Staff:  Circulator: Lorrie Ramirez R.N.  Scrub Person: Ethan Sorensen  First Assist: Lindsey Jefferson R.N.    Specimens removed if any:  * No specimens in log *    Estimated Blood Loss: minimal    Findings: MMT, OA    Complications: none        9/13/2018 2:04 PM Olman Baez M.D.

## 2018-11-05 ENCOUNTER — IMMUNIZATION (OUTPATIENT)
Dept: SOCIAL WORK | Facility: CLINIC | Age: 63
End: 2018-11-05
Payer: COMMERCIAL

## 2018-11-05 DIAGNOSIS — Z23 NEED FOR VACCINATION: ICD-10-CM

## 2018-11-05 PROCEDURE — 90686 IIV4 VACC NO PRSV 0.5 ML IM: CPT | Performed by: REGISTERED NURSE

## 2018-11-05 PROCEDURE — 90471 IMMUNIZATION ADMIN: CPT | Performed by: REGISTERED NURSE

## 2019-12-05 DIAGNOSIS — Z01.812 PRE-OPERATIVE LABORATORY EXAMINATION: ICD-10-CM

## 2019-12-05 DIAGNOSIS — Z01.810 PRE-OPERATIVE CARDIOVASCULAR EXAMINATION: ICD-10-CM

## 2019-12-05 LAB
ANION GAP SERPL CALC-SCNC: 8 MMOL/L (ref 0–11.9)
BUN SERPL-MCNC: 18 MG/DL (ref 8–22)
CALCIUM SERPL-MCNC: 9.3 MG/DL (ref 8.5–10.5)
CHLORIDE SERPL-SCNC: 103 MMOL/L (ref 96–112)
CO2 SERPL-SCNC: 27 MMOL/L (ref 20–33)
CREAT SERPL-MCNC: 1.16 MG/DL (ref 0.5–1.4)
EKG IMPRESSION: NORMAL
ERYTHROCYTE [DISTWIDTH] IN BLOOD BY AUTOMATED COUNT: 43.3 FL (ref 35.9–50)
GLUCOSE SERPL-MCNC: 87 MG/DL (ref 65–99)
HCT VFR BLD AUTO: 45.6 % (ref 42–52)
HGB BLD-MCNC: 14.8 G/DL (ref 14–18)
MCH RBC QN AUTO: 30.6 PG (ref 27–33)
MCHC RBC AUTO-ENTMCNC: 32.5 G/DL (ref 33.7–35.3)
MCV RBC AUTO: 94.2 FL (ref 81.4–97.8)
PLATELET # BLD AUTO: 270 K/UL (ref 164–446)
PMV BLD AUTO: 8.8 FL (ref 9–12.9)
POTASSIUM SERPL-SCNC: 4.3 MMOL/L (ref 3.6–5.5)
RBC # BLD AUTO: 4.84 M/UL (ref 4.7–6.1)
SCCMEC + MECA PNL NOSE NAA+PROBE: NEGATIVE
SCCMEC + MECA PNL NOSE NAA+PROBE: NEGATIVE
SODIUM SERPL-SCNC: 138 MMOL/L (ref 135–145)
WBC # BLD AUTO: 5.9 K/UL (ref 4.8–10.8)

## 2019-12-05 PROCEDURE — 93005 ELECTROCARDIOGRAM TRACING: CPT

## 2019-12-05 PROCEDURE — 80048 BASIC METABOLIC PNL TOTAL CA: CPT

## 2019-12-05 PROCEDURE — 87641 MR-STAPH DNA AMP PROBE: CPT

## 2019-12-05 PROCEDURE — 87640 STAPH A DNA AMP PROBE: CPT

## 2019-12-05 PROCEDURE — 93010 ELECTROCARDIOGRAM REPORT: CPT | Performed by: INTERNAL MEDICINE

## 2019-12-05 PROCEDURE — 36415 COLL VENOUS BLD VENIPUNCTURE: CPT

## 2019-12-05 PROCEDURE — 85027 COMPLETE CBC AUTOMATED: CPT

## 2019-12-05 RX ORDER — HALOBETASOL PROPIONATE 0.05 %
OINTMENT (GRAM) TOPICAL PRN
COMMUNITY
Start: 2019-09-25 | End: 2022-04-14

## 2019-12-05 RX ORDER — SUMATRIPTAN 100 MG/1
100 TABLET, FILM COATED ORAL PRN
COMMUNITY

## 2019-12-05 SDOH — HEALTH STABILITY: MENTAL HEALTH: HOW MANY STANDARD DRINKS CONTAINING ALCOHOL DO YOU HAVE ON A TYPICAL DAY?: 1 OR 2

## 2019-12-05 SDOH — HEALTH STABILITY: MENTAL HEALTH: HOW OFTEN DO YOU HAVE A DRINK CONTAINING ALCOHOL?: 2-4 TIMES A MONTH

## 2019-12-05 SDOH — HEALTH STABILITY: MENTAL HEALTH: HOW OFTEN DO YOU HAVE 6 OR MORE DRINKS ON ONE OCCASION?: NEVER

## 2019-12-05 NOTE — OR NURSING
"Pre-admit appointment completed. \"Preparing for your procedure\" sheet given to pt along with verbal and written instructions. Pt instructed to continue regularly prescribed medications through the day before surgery.       Pt given LATHA education due to score of 5 on LATHA screening tool. Enc to follow up with PCP.  "

## 2019-12-05 NOTE — OR NURSING
"TOTAL JOINT REPLACEMENT SURGERY   PreAdmit Appointment  Pre-Operative Education Note       1) Did you take a Total Joint Replacement Pre-Operative Education class?  Where?  Answer: no    2) If you did not take a class, did you receive pre-op education in the form of a book or through an online class?    Answer: no    3) Have you had the same joint replacement procedure within the last 3 years?   Answer: no    For patients who answered \"No\" to the above questions:     4) Was patient given information on [facility] Pre-Op Education class through the Pre-Op Education Class flyer or the Alternative Pre-op Education flyer?   Answer: yes    5) Was a [facility] Total Joint Replacement Patient Guide binder handed out?   Answer: yes    6) Did the patient refuse preoperative education?  Answer:  no    DISCHARGE PLANNING NOTE - TOTAL JOINT    Procedure: partial vs total knee  Procedure Date: 12/9/19  Insurance:    Equipment currently available at home? Rolling walker and crutches  Steps into the home? 1  Steps within the home? 0  Toilet height? standard  Type of shower? Walk in   Who will be with you during your recovery? spouse  Is Outpatient Physical Therapy set up after surgery? Yes  Did you take the Total Joint Class and where? No   Planning same day discharge?Yes     Plan: Pt given home safety checklist and equipment resource guide. Reviewed with pt.  "

## 2019-12-08 ENCOUNTER — ANESTHESIA EVENT (OUTPATIENT)
Dept: SURGERY | Facility: MEDICAL CENTER | Age: 64
End: 2019-12-08
Payer: COMMERCIAL

## 2019-12-09 ENCOUNTER — ANESTHESIA (OUTPATIENT)
Dept: SURGERY | Facility: MEDICAL CENTER | Age: 64
End: 2019-12-09
Payer: COMMERCIAL

## 2019-12-09 ENCOUNTER — HOSPITAL ENCOUNTER (OUTPATIENT)
Facility: MEDICAL CENTER | Age: 64
End: 2019-12-10
Attending: ORTHOPAEDIC SURGERY | Admitting: ORTHOPAEDIC SURGERY
Payer: COMMERCIAL

## 2019-12-09 ENCOUNTER — APPOINTMENT (OUTPATIENT)
Dept: RADIOLOGY | Facility: MEDICAL CENTER | Age: 64
End: 2019-12-09
Attending: ORTHOPAEDIC SURGERY
Payer: COMMERCIAL

## 2019-12-09 DIAGNOSIS — Z96.651 S/P TOTAL KNEE ARTHROPLASTY, RIGHT: ICD-10-CM

## 2019-12-09 PROCEDURE — 501445 HCHG STAPLER, SKIN DISP: Performed by: ORTHOPAEDIC SURGERY

## 2019-12-09 PROCEDURE — A9270 NON-COVERED ITEM OR SERVICE: HCPCS | Performed by: ORTHOPAEDIC SURGERY

## 2019-12-09 PROCEDURE — 501838 HCHG SUTURE GENERAL: Performed by: ORTHOPAEDIC SURGERY

## 2019-12-09 PROCEDURE — 700105 HCHG RX REV CODE 258: Performed by: ORTHOPAEDIC SURGERY

## 2019-12-09 PROCEDURE — G0378 HOSPITAL OBSERVATION PER HR: HCPCS

## 2019-12-09 PROCEDURE — 700111 HCHG RX REV CODE 636 W/ 250 OVERRIDE (IP): Performed by: ANESTHESIOLOGY

## 2019-12-09 PROCEDURE — A9270 NON-COVERED ITEM OR SERVICE: HCPCS | Performed by: ANESTHESIOLOGY

## 2019-12-09 PROCEDURE — 73560 X-RAY EXAM OF KNEE 1 OR 2: CPT | Mod: RT

## 2019-12-09 PROCEDURE — 94760 N-INVAS EAR/PLS OXIMETRY 1: CPT

## 2019-12-09 PROCEDURE — 160009 HCHG ANES TIME/MIN: Performed by: ORTHOPAEDIC SURGERY

## 2019-12-09 PROCEDURE — 160029 HCHG SURGERY MINUTES - 1ST 30 MINS LEVEL 4: Performed by: ORTHOPAEDIC SURGERY

## 2019-12-09 PROCEDURE — 160041 HCHG SURGERY MINUTES - EA ADDL 1 MIN LEVEL 4: Performed by: ORTHOPAEDIC SURGERY

## 2019-12-09 PROCEDURE — 700102 HCHG RX REV CODE 250 W/ 637 OVERRIDE(OP): Performed by: ORTHOPAEDIC SURGERY

## 2019-12-09 PROCEDURE — 96376 TX/PRO/DX INJ SAME DRUG ADON: CPT

## 2019-12-09 PROCEDURE — 700101 HCHG RX REV CODE 250: Performed by: ANESTHESIOLOGY

## 2019-12-09 PROCEDURE — L8699 PROSTHETIC IMPLANT NOS: HCPCS | Performed by: ORTHOPAEDIC SURGERY

## 2019-12-09 PROCEDURE — 700111 HCHG RX REV CODE 636 W/ 250 OVERRIDE (IP): Performed by: ORTHOPAEDIC SURGERY

## 2019-12-09 PROCEDURE — 160036 HCHG PACU - EA ADDL 30 MINS PHASE I: Performed by: ORTHOPAEDIC SURGERY

## 2019-12-09 PROCEDURE — 700112 HCHG RX REV CODE 229: Performed by: ORTHOPAEDIC SURGERY

## 2019-12-09 PROCEDURE — 160035 HCHG PACU - 1ST 60 MINS PHASE I: Performed by: ORTHOPAEDIC SURGERY

## 2019-12-09 PROCEDURE — 96375 TX/PRO/DX INJ NEW DRUG ADDON: CPT

## 2019-12-09 PROCEDURE — 160002 HCHG RECOVERY MINUTES (STAT): Performed by: ORTHOPAEDIC SURGERY

## 2019-12-09 PROCEDURE — 700102 HCHG RX REV CODE 250 W/ 637 OVERRIDE(OP): Performed by: ANESTHESIOLOGY

## 2019-12-09 PROCEDURE — 160022 HCHG BLOCK: Performed by: ORTHOPAEDIC SURGERY

## 2019-12-09 PROCEDURE — 502000 HCHG MISC OR IMPLANTS RC 0278: Performed by: ORTHOPAEDIC SURGERY

## 2019-12-09 PROCEDURE — 160048 HCHG OR STATISTICAL LEVEL 1-5: Performed by: ORTHOPAEDIC SURGERY

## 2019-12-09 PROCEDURE — 96365 THER/PROPH/DIAG IV INF INIT: CPT

## 2019-12-09 DEVICE — CEMENT ORTHOPEDIC HV US  (10/PK): Type: IMPLANTABLE DEVICE | Site: KNEE | Status: FUNCTIONAL

## 2019-12-09 DEVICE — IMPLANTABLE DEVICE: Type: IMPLANTABLE DEVICE | Site: KNEE | Status: FUNCTIONAL

## 2019-12-09 RX ORDER — ONDANSETRON 2 MG/ML
INJECTION INTRAMUSCULAR; INTRAVENOUS PRN
Status: DISCONTINUED | OUTPATIENT
Start: 2019-12-09 | End: 2019-12-09 | Stop reason: SURG

## 2019-12-09 RX ORDER — HALOPERIDOL 5 MG/ML
1 INJECTION INTRAMUSCULAR
Status: DISCONTINUED | OUTPATIENT
Start: 2019-12-09 | End: 2019-12-09 | Stop reason: HOSPADM

## 2019-12-09 RX ORDER — SCOLOPAMINE TRANSDERMAL SYSTEM 1 MG/1
1 PATCH, EXTENDED RELEASE TRANSDERMAL
Status: DISCONTINUED | OUTPATIENT
Start: 2019-12-09 | End: 2019-12-10 | Stop reason: HOSPADM

## 2019-12-09 RX ORDER — HYDROMORPHONE HYDROCHLORIDE 2 MG/1
2 TABLET ORAL
Status: DISCONTINUED | OUTPATIENT
Start: 2019-12-09 | End: 2019-12-10 | Stop reason: HOSPADM

## 2019-12-09 RX ORDER — HALOPERIDOL 5 MG/ML
1 INJECTION INTRAMUSCULAR EVERY 6 HOURS PRN
Status: DISCONTINUED | OUTPATIENT
Start: 2019-12-09 | End: 2019-12-10 | Stop reason: HOSPADM

## 2019-12-09 RX ORDER — KETOROLAC TROMETHAMINE 30 MG/ML
30 INJECTION, SOLUTION INTRAMUSCULAR; INTRAVENOUS EVERY 6 HOURS
Status: COMPLETED | OUTPATIENT
Start: 2019-12-09 | End: 2019-12-10

## 2019-12-09 RX ORDER — ENEMA 19; 7 G/133ML; G/133ML
1 ENEMA RECTAL
Status: DISCONTINUED | OUTPATIENT
Start: 2019-12-09 | End: 2019-12-10 | Stop reason: HOSPADM

## 2019-12-09 RX ORDER — DIPHENHYDRAMINE HYDROCHLORIDE 50 MG/ML
25 INJECTION INTRAMUSCULAR; INTRAVENOUS EVERY 6 HOURS PRN
Status: DISCONTINUED | OUTPATIENT
Start: 2019-12-09 | End: 2019-12-10 | Stop reason: HOSPADM

## 2019-12-09 RX ORDER — BUPIVACAINE HYDROCHLORIDE 2.5 MG/ML
INJECTION, SOLUTION EPIDURAL; INFILTRATION; INTRACAUDAL
Status: COMPLETED | OUTPATIENT
Start: 2019-12-09 | End: 2019-12-09

## 2019-12-09 RX ORDER — POLYETHYLENE GLYCOL 3350 17 G/17G
1 POWDER, FOR SOLUTION ORAL 2 TIMES DAILY PRN
Status: DISCONTINUED | OUTPATIENT
Start: 2019-12-09 | End: 2019-12-10 | Stop reason: HOSPADM

## 2019-12-09 RX ORDER — FAMOTIDINE 20 MG/1
20 TABLET, FILM COATED ORAL
Status: DISCONTINUED | OUTPATIENT
Start: 2019-12-09 | End: 2019-12-10 | Stop reason: HOSPADM

## 2019-12-09 RX ORDER — CELECOXIB 200 MG/1
200 CAPSULE ORAL 2 TIMES DAILY
Status: DISCONTINUED | OUTPATIENT
Start: 2019-12-10 | End: 2019-12-10 | Stop reason: HOSPADM

## 2019-12-09 RX ORDER — HYDROMORPHONE HYDROCHLORIDE 1 MG/ML
0.1 INJECTION, SOLUTION INTRAMUSCULAR; INTRAVENOUS; SUBCUTANEOUS
Status: DISCONTINUED | OUTPATIENT
Start: 2019-12-09 | End: 2019-12-09 | Stop reason: HOSPADM

## 2019-12-09 RX ORDER — DEXAMETHASONE SODIUM PHOSPHATE 4 MG/ML
INJECTION, SOLUTION INTRA-ARTICULAR; INTRALESIONAL; INTRAMUSCULAR; INTRAVENOUS; SOFT TISSUE PRN
Status: DISCONTINUED | OUTPATIENT
Start: 2019-12-09 | End: 2019-12-09 | Stop reason: SURG

## 2019-12-09 RX ORDER — AMOXICILLIN 250 MG
1 CAPSULE ORAL NIGHTLY
Status: DISCONTINUED | OUTPATIENT
Start: 2019-12-09 | End: 2019-12-10 | Stop reason: HOSPADM

## 2019-12-09 RX ORDER — BISACODYL 10 MG
10 SUPPOSITORY, RECTAL RECTAL
Status: DISCONTINUED | OUTPATIENT
Start: 2019-12-09 | End: 2019-12-10 | Stop reason: HOSPADM

## 2019-12-09 RX ORDER — DIPHENHYDRAMINE HCL 25 MG
25 TABLET ORAL EVERY 6 HOURS PRN
Status: DISCONTINUED | OUTPATIENT
Start: 2019-12-09 | End: 2019-12-10 | Stop reason: HOSPADM

## 2019-12-09 RX ORDER — HYDROMORPHONE HYDROCHLORIDE 1 MG/ML
0.4 INJECTION, SOLUTION INTRAMUSCULAR; INTRAVENOUS; SUBCUTANEOUS
Status: DISCONTINUED | OUTPATIENT
Start: 2019-12-09 | End: 2019-12-09 | Stop reason: HOSPADM

## 2019-12-09 RX ORDER — HYDROMORPHONE HYDROCHLORIDE 1 MG/ML
0.2 INJECTION, SOLUTION INTRAMUSCULAR; INTRAVENOUS; SUBCUTANEOUS
Status: DISCONTINUED | OUTPATIENT
Start: 2019-12-09 | End: 2019-12-09 | Stop reason: HOSPADM

## 2019-12-09 RX ORDER — GABAPENTIN 300 MG/1
300 CAPSULE ORAL ONCE
Status: COMPLETED | OUTPATIENT
Start: 2019-12-09 | End: 2019-12-09

## 2019-12-09 RX ORDER — DIPHENHYDRAMINE HYDROCHLORIDE 50 MG/ML
12.5 INJECTION INTRAMUSCULAR; INTRAVENOUS
Status: DISCONTINUED | OUTPATIENT
Start: 2019-12-09 | End: 2019-12-09 | Stop reason: HOSPADM

## 2019-12-09 RX ORDER — CEFAZOLIN SODIUM 1 G/3ML
INJECTION, POWDER, FOR SOLUTION INTRAMUSCULAR; INTRAVENOUS PRN
Status: DISCONTINUED | OUTPATIENT
Start: 2019-12-09 | End: 2019-12-09 | Stop reason: SURG

## 2019-12-09 RX ORDER — LIDOCAINE HYDROCHLORIDE 20 MG/ML
INJECTION, SOLUTION EPIDURAL; INFILTRATION; INTRACAUDAL; PERINEURAL PRN
Status: DISCONTINUED | OUTPATIENT
Start: 2019-12-09 | End: 2019-12-09 | Stop reason: SURG

## 2019-12-09 RX ORDER — ONDANSETRON 2 MG/ML
4 INJECTION INTRAMUSCULAR; INTRAVENOUS
Status: DISCONTINUED | OUTPATIENT
Start: 2019-12-09 | End: 2019-12-09 | Stop reason: HOSPADM

## 2019-12-09 RX ORDER — ACETAMINOPHEN 500 MG
1000 TABLET ORAL ONCE
Status: COMPLETED | OUTPATIENT
Start: 2019-12-09 | End: 2019-12-09

## 2019-12-09 RX ORDER — DOCUSATE SODIUM 100 MG/1
100 CAPSULE, LIQUID FILLED ORAL 2 TIMES DAILY
Status: DISCONTINUED | OUTPATIENT
Start: 2019-12-09 | End: 2019-12-10 | Stop reason: HOSPADM

## 2019-12-09 RX ORDER — DIPHENHYDRAMINE HCL 25 MG
25 TABLET ORAL NIGHTLY PRN
Status: DISCONTINUED | OUTPATIENT
Start: 2019-12-10 | End: 2019-12-10 | Stop reason: HOSPADM

## 2019-12-09 RX ORDER — MEPERIDINE HYDROCHLORIDE 25 MG/ML
6.25 INJECTION INTRAMUSCULAR; INTRAVENOUS; SUBCUTANEOUS
Status: DISCONTINUED | OUTPATIENT
Start: 2019-12-09 | End: 2019-12-09 | Stop reason: HOSPADM

## 2019-12-09 RX ORDER — HYDROCHLOROTHIAZIDE 12.5 MG/1
12.5 CAPSULE, GELATIN COATED ORAL DAILY
Status: DISCONTINUED | OUTPATIENT
Start: 2019-12-10 | End: 2019-12-10 | Stop reason: HOSPADM

## 2019-12-09 RX ORDER — SODIUM CHLORIDE, SODIUM LACTATE, POTASSIUM CHLORIDE, CALCIUM CHLORIDE 600; 310; 30; 20 MG/100ML; MG/100ML; MG/100ML; MG/100ML
INJECTION, SOLUTION INTRAVENOUS CONTINUOUS
Status: ACTIVE | OUTPATIENT
Start: 2019-12-09 | End: 2019-12-09

## 2019-12-09 RX ORDER — ACETAMINOPHEN 325 MG/1
650 TABLET ORAL EVERY 6 HOURS
Status: DISCONTINUED | OUTPATIENT
Start: 2019-12-09 | End: 2019-12-10 | Stop reason: HOSPADM

## 2019-12-09 RX ORDER — LABETALOL HYDROCHLORIDE 5 MG/ML
5 INJECTION, SOLUTION INTRAVENOUS
Status: DISCONTINUED | OUTPATIENT
Start: 2019-12-09 | End: 2019-12-09 | Stop reason: HOSPADM

## 2019-12-09 RX ORDER — AMOXICILLIN 250 MG
1 CAPSULE ORAL
Status: DISCONTINUED | OUTPATIENT
Start: 2019-12-09 | End: 2019-12-10 | Stop reason: HOSPADM

## 2019-12-09 RX ORDER — SODIUM CHLORIDE, SODIUM LACTATE, POTASSIUM CHLORIDE, CALCIUM CHLORIDE 600; 310; 30; 20 MG/100ML; MG/100ML; MG/100ML; MG/100ML
INJECTION, SOLUTION INTRAVENOUS CONTINUOUS
Status: DISCONTINUED | OUTPATIENT
Start: 2019-12-09 | End: 2019-12-09 | Stop reason: HOSPADM

## 2019-12-09 RX ORDER — TRANEXAMIC ACID 100 MG/ML
INJECTION, SOLUTION INTRAVENOUS PRN
Status: DISCONTINUED | OUTPATIENT
Start: 2019-12-09 | End: 2019-12-09 | Stop reason: SURG

## 2019-12-09 RX ORDER — HYDROMORPHONE HYDROCHLORIDE 1 MG/ML
0.5 INJECTION, SOLUTION INTRAMUSCULAR; INTRAVENOUS; SUBCUTANEOUS
Status: DISCONTINUED | OUTPATIENT
Start: 2019-12-09 | End: 2019-12-10 | Stop reason: HOSPADM

## 2019-12-09 RX ORDER — ENALAPRIL MALEATE 5 MG/1
10 TABLET ORAL 2 TIMES DAILY
Status: DISCONTINUED | OUTPATIENT
Start: 2019-12-09 | End: 2019-12-10 | Stop reason: HOSPADM

## 2019-12-09 RX ORDER — DEXTROSE, SODIUM CHLORIDE, SODIUM LACTATE, POTASSIUM CHLORIDE, AND CALCIUM CHLORIDE 5; .6; .31; .03; .02 G/100ML; G/100ML; G/100ML; G/100ML; G/100ML
INJECTION, SOLUTION INTRAVENOUS CONTINUOUS
Status: DISCONTINUED | OUTPATIENT
Start: 2019-12-09 | End: 2019-12-10 | Stop reason: HOSPADM

## 2019-12-09 RX ORDER — DEXAMETHASONE SODIUM PHOSPHATE 4 MG/ML
4 INJECTION, SOLUTION INTRA-ARTICULAR; INTRALESIONAL; INTRAMUSCULAR; INTRAVENOUS; SOFT TISSUE
Status: DISCONTINUED | OUTPATIENT
Start: 2019-12-09 | End: 2019-12-10 | Stop reason: HOSPADM

## 2019-12-09 RX ORDER — ONDANSETRON 2 MG/ML
4 INJECTION INTRAMUSCULAR; INTRAVENOUS EVERY 4 HOURS PRN
Status: DISCONTINUED | OUTPATIENT
Start: 2019-12-09 | End: 2019-12-10 | Stop reason: HOSPADM

## 2019-12-09 RX ADMIN — FAMOTIDINE 20 MG: 20 TABLET ORAL at 21:28

## 2019-12-09 RX ADMIN — FENTANYL CITRATE 50 MCG: 50 INJECTION, SOLUTION INTRAMUSCULAR; INTRAVENOUS at 09:37

## 2019-12-09 RX ADMIN — EPHEDRINE SULFATE 5 MG: 50 INJECTION INTRAMUSCULAR; INTRAVENOUS; SUBCUTANEOUS at 10:23

## 2019-12-09 RX ADMIN — ACETAMINOPHEN 650 MG: 325 TABLET, FILM COATED ORAL at 13:11

## 2019-12-09 RX ADMIN — SODIUM CHLORIDE, POTASSIUM CHLORIDE, SODIUM LACTATE AND CALCIUM CHLORIDE: 600; 310; 30; 20 INJECTION, SOLUTION INTRAVENOUS at 10:58

## 2019-12-09 RX ADMIN — DOCUSATE SODIUM 100 MG: 100 CAPSULE, LIQUID FILLED ORAL at 18:05

## 2019-12-09 RX ADMIN — EPHEDRINE SULFATE 5 MG: 50 INJECTION INTRAMUSCULAR; INTRAVENOUS; SUBCUTANEOUS at 10:29

## 2019-12-09 RX ADMIN — EPHEDRINE SULFATE 10 MG: 50 INJECTION INTRAMUSCULAR; INTRAVENOUS; SUBCUTANEOUS at 10:49

## 2019-12-09 RX ADMIN — SODIUM CHLORIDE, POTASSIUM CHLORIDE, SODIUM LACTATE AND CALCIUM CHLORIDE: 600; 310; 30; 20 INJECTION, SOLUTION INTRAVENOUS at 08:35

## 2019-12-09 RX ADMIN — EPHEDRINE SULFATE 5 MG: 50 INJECTION INTRAMUSCULAR; INTRAVENOUS; SUBCUTANEOUS at 09:45

## 2019-12-09 RX ADMIN — FENTANYL CITRATE 50 MCG: 50 INJECTION, SOLUTION INTRAMUSCULAR; INTRAVENOUS at 11:11

## 2019-12-09 RX ADMIN — SODIUM CHLORIDE, SODIUM LACTATE, POTASSIUM CHLORIDE, CALCIUM CHLORIDE AND DEXTROSE MONOHYDRATE: 5; 600; 310; 30; 20 INJECTION, SOLUTION INTRAVENOUS at 13:11

## 2019-12-09 RX ADMIN — KETOROLAC TROMETHAMINE 30 MG: 30 INJECTION, SOLUTION INTRAMUSCULAR at 18:05

## 2019-12-09 RX ADMIN — KETOROLAC TROMETHAMINE 30 MG: 30 INJECTION, SOLUTION INTRAMUSCULAR at 13:11

## 2019-12-09 RX ADMIN — MIDAZOLAM HYDROCHLORIDE 2 MG: 1 INJECTION, SOLUTION INTRAMUSCULAR; INTRAVENOUS at 09:18

## 2019-12-09 RX ADMIN — BUPIVACAINE HYDROCHLORIDE 20 ML: 2.5 INJECTION, SOLUTION EPIDURAL; INFILTRATION; INTRACAUDAL; PERINEURAL at 09:19

## 2019-12-09 RX ADMIN — SENNOSIDES AND DOCUSATE SODIUM 1 TABLET: 8.6; 5 TABLET ORAL at 21:28

## 2019-12-09 RX ADMIN — ACETAMINOPHEN 650 MG: 325 TABLET, FILM COATED ORAL at 18:05

## 2019-12-09 RX ADMIN — EPHEDRINE SULFATE 10 MG: 50 INJECTION INTRAMUSCULAR; INTRAVENOUS; SUBCUTANEOUS at 10:33

## 2019-12-09 RX ADMIN — TRANEXAMIC ACID 1000 MG: 100 INJECTION, SOLUTION INTRAVENOUS at 09:39

## 2019-12-09 RX ADMIN — CEFAZOLIN 2 G: 1 INJECTION, POWDER, FOR SOLUTION INTRAVENOUS at 09:39

## 2019-12-09 RX ADMIN — LIDOCAINE HYDROCHLORIDE 80 MG: 20 INJECTION, SOLUTION EPIDURAL; INFILTRATION; INTRACAUDAL; PERINEURAL at 09:37

## 2019-12-09 RX ADMIN — EPHEDRINE SULFATE 5 MG: 50 INJECTION INTRAMUSCULAR; INTRAVENOUS; SUBCUTANEOUS at 10:14

## 2019-12-09 RX ADMIN — PROPOFOL 160 MG: 10 INJECTION, EMULSION INTRAVENOUS at 09:37

## 2019-12-09 RX ADMIN — DEXAMETHASONE SODIUM PHOSPHATE 8 MG: 4 INJECTION, SOLUTION INTRAMUSCULAR; INTRAVENOUS at 09:39

## 2019-12-09 RX ADMIN — FENTANYL CITRATE 50 MCG: 50 INJECTION, SOLUTION INTRAMUSCULAR; INTRAVENOUS at 10:19

## 2019-12-09 RX ADMIN — PROPOFOL 40 MG: 10 INJECTION, EMULSION INTRAVENOUS at 09:54

## 2019-12-09 RX ADMIN — CEFAZOLIN 2 G: 10 INJECTION, POWDER, FOR SOLUTION INTRAVENOUS; PARENTERAL at 18:04

## 2019-12-09 RX ADMIN — ONDANSETRON 4 MG: 2 INJECTION INTRAMUSCULAR; INTRAVENOUS at 10:59

## 2019-12-09 RX ADMIN — ACETAMINOPHEN 1000 MG: 500 TABLET, FILM COATED ORAL at 08:40

## 2019-12-09 RX ADMIN — FENTANYL CITRATE 25 MCG: 50 INJECTION INTRAMUSCULAR; INTRAVENOUS at 11:23

## 2019-12-09 RX ADMIN — GABAPENTIN 300 MG: 300 CAPSULE ORAL at 08:40

## 2019-12-09 ASSESSMENT — COGNITIVE AND FUNCTIONAL STATUS - GENERAL
DRESSING REGULAR UPPER BODY CLOTHING: A LITTLE
WALKING IN HOSPITAL ROOM: A LITTLE
SUGGESTED CMS G CODE MODIFIER DAILY ACTIVITY: CK
MOVING FROM LYING ON BACK TO SITTING ON SIDE OF FLAT BED: A LITTLE
SUGGESTED CMS G CODE MODIFIER DAILY ACTIVITY: CK
DAILY ACTIVITIY SCORE: 18
MOVING TO AND FROM BED TO CHAIR: A LITTLE
MOVING FROM LYING ON BACK TO SITTING ON SIDE OF FLAT BED: A LITTLE
DRESSING REGULAR LOWER BODY CLOTHING: A LITTLE
TOILETING: A LITTLE
HELP NEEDED FOR BATHING: A LITTLE
DRESSING REGULAR UPPER BODY CLOTHING: A LITTLE
CLIMB 3 TO 5 STEPS WITH RAILING: A LITTLE
TURNING FROM BACK TO SIDE WHILE IN FLAT BAD: A LITTLE
WALKING IN HOSPITAL ROOM: A LITTLE
DAILY ACTIVITIY SCORE: 18
TURNING FROM BACK TO SIDE WHILE IN FLAT BAD: A LITTLE
DRESSING REGULAR LOWER BODY CLOTHING: A LITTLE
STANDING UP FROM CHAIR USING ARMS: A LITTLE
PERSONAL GROOMING: A LITTLE
EATING MEALS: A LITTLE
PERSONAL GROOMING: A LITTLE
CLIMB 3 TO 5 STEPS WITH RAILING: A LITTLE
EATING MEALS: A LITTLE
SUGGESTED CMS G CODE MODIFIER MOBILITY: CK
TOILETING: A LITTLE
MOBILITY SCORE: 18
MOVING TO AND FROM BED TO CHAIR: A LITTLE
HELP NEEDED FOR BATHING: A LITTLE
STANDING UP FROM CHAIR USING ARMS: A LITTLE

## 2019-12-09 ASSESSMENT — PATIENT HEALTH QUESTIONNAIRE - PHQ9
1. LITTLE INTEREST OR PLEASURE IN DOING THINGS: NOT AT ALL
2. FEELING DOWN, DEPRESSED, IRRITABLE, OR HOPELESS: NOT AT ALL
SUM OF ALL RESPONSES TO PHQ9 QUESTIONS 1 AND 2: 0

## 2019-12-09 ASSESSMENT — LIFESTYLE VARIABLES
CONSUMPTION TOTAL: NEGATIVE
HAVE PEOPLE ANNOYED YOU BY CRITICIZING YOUR DRINKING: NO
ALCOHOL_USE: NO
HOW MANY TIMES IN THE PAST YEAR HAVE YOU HAD 5 OR MORE DRINKS IN A DAY: 0
TOTAL SCORE: 0
EVER_SMOKED: NEVER
ON A TYPICAL DAY WHEN YOU DRINK ALCOHOL HOW MANY DRINKS DO YOU HAVE: 0
TOTAL SCORE: 0
EVER HAD A DRINK FIRST THING IN THE MORNING TO STEADY YOUR NERVES TO GET RID OF A HANGOVER: NO
AVERAGE NUMBER OF DAYS PER WEEK YOU HAVE A DRINK CONTAINING ALCOHOL: 0
TOTAL SCORE: 0
HAVE YOU EVER FELT YOU SHOULD CUT DOWN ON YOUR DRINKING: NO
EVER FELT BAD OR GUILTY ABOUT YOUR DRINKING: NO
DOES PATIENT WANT TO STOP DRINKING: NO

## 2019-12-09 ASSESSMENT — COPD QUESTIONNAIRES
IN THE PAST 12 MONTHS DO YOU DO LESS THAN YOU USED TO BECAUSE OF YOUR BREATHING PROBLEMS: DISAGREE/UNSURE
HAVE YOU SMOKED AT LEAST 100 CIGARETTES IN YOUR ENTIRE LIFE: NO/DON'T KNOW
DO YOU EVER COUGH UP ANY MUCUS OR PHLEGM?: NO/ONLY WITH OCCASIONAL COLDS OR INFECTIONS
COPD SCREENING SCORE: 2
DURING THE PAST 4 WEEKS HOW MUCH DID YOU FEEL SHORT OF BREATH: NONE/LITTLE OF THE TIME

## 2019-12-09 ASSESSMENT — PAIN SCALES - GENERAL: PAIN_LEVEL: 5

## 2019-12-09 NOTE — ANESTHESIA QCDR
2019 [facility] Clinical Data Registry (for Quality Improvement)     Postoperative nausea/vomiting risk protocol (Adult = 18 yrs and Pediatric 3-17 yrs)- (430 and 463)  General inhalation anesthetic (NOT TIVA) with PONV risk factors: No  Provision of anti-emetic therapy with at least 2 different classes of agents: N/A  Patient DID NOT receive anti-emetic therapy and reason is documented in Medical Record: N/A    Multimodal Pain Management- (AQI59)  Patient undergoing Elective Surgery (i.e. Outpatient, or ASC, or Prescheduled Surgery prior to Hospital Admission): Yes  Use of Multimodal Pain Management, two or more drugs and/or interventions, NOT including systemic opioids: Yes   Exception: Documented allergy to multiple classes of analgesics:  N/A    PACU assessment of acute postoperative pain prior to Anesthesia Care End- Applies to Patients Age = 18- (ABG7)  Initial PACU pain score is which of the following: < 7/10  Patient unable to report pain score: N/A    Post-anesthetic transfer of care checklist/protocol to PACU/ICU- (426 and 427)  Upon conclusion of case, patient transferred to which of the following locations: PACU/Non-ICU  Use of transfer checklist/protocol: Yes  Exclusion: Service Performed in Patient Hospital Room (and thus did not require transfer): N/A    PACU Reintubation- (AQI31)  General anesthesia requiring endotracheal intubation (ETT) along with subsequent extubation in OR or PACU: No  Required reintubation in the PACU: N/A  Extubation was a planned trial documented in the medical record prior to removal of the original airway device: N/A    Unplanned admission to ICU related to anesthesia service up through end of PACU care- (MD51)  Unplanned admission to ICU (not initially anticipated at anesthesia start time): No

## 2019-12-09 NOTE — ANESTHESIA TIME REPORT
Anesthesia Start and Stop Event Times     Date Time Event    12/9/2019 0921 Ready for Procedure     0933 Anesthesia Start     1117 Anesthesia Stop        Responsible Staff  12/09/19    Name Role Begin End    Fermin Munoz M.D. Anesth 0933 1117        Preop Diagnosis (Free Text):  Pre-op Diagnosis     OSTEOARTHRITIS OF KNEE        Preop Diagnosis (Codes):    Post op Diagnosis  Arthritis of right knee      Premium Reason  Non-Premium    Comments: adductor canal block

## 2019-12-09 NOTE — OP REPORT
DATE OF SERVICE:  12/09/2019    PREOPERATIVE DIAGNOSIS:  Anteromedial osteoarthritis, right knee.    POSTOPERATIVE DIAGNOSIS:  Anteromedial osteoarthritis, right knee.    PROCEDURE:  Right Oxford unicompartmental knee replacement with the [place]   system using a cemented size medium femur, a cemented size E tibia and a 5 mm   mobile bearing.    SURGEON:  Olman Baez MD    ASSISTANT:  Fermin Sierra CFA, CST.  Of note, the assistant played a crucial   role in this procedure by helping with the exposure and instrumentation for   this complex hemiarthroplasty.    ANESTHESIA:  General per LMA with adductor canal block.    ANESTHESIOLOGIST:  Fermin Munoz MD    OPERATIVE INDICATIONS:  The patient is a 64-year-old white male who has had an   ongoing history of medial right knee pain.  He is status post an arthroscopy   and partial medial meniscectomy approximately a year ago, but was noted to   have moderate anteromedial arthritis with a preserved lateral joint and   essentially intact patellofemoral joint.  He is no longer responding to   conservative program and it was felt that an [place] unicompartmental knee   replacement versus total knee replacement is indicated in this active   gentleman.     PROCEDURE IN DETAIL:  The patient was brought to the operating room and placed   on table in a supine position where a satisfactory general anesthetic agent   was administered per LMA.  Of note, an adductor canal block was performed by   Dr. Munoz in preoperative holding for perioperative pain control.  The   right knee revealed a range of 0/0/135 with no pathological laxity about the   cruciates or collaterals.  The patient was given 2 grams of Ancef, 1 gram of   tranexamic acid IV prior to beginning the procedure.  The right lower   extremity was placed in the [place] leg castellanos.  The right lower extremity was   prepped with gel prep, draped free in a sterile fashion.  A 12 cm anteromedial   incision was made,  carried sharply through skin and subcutaneous tissue and a   medial parapatellar incision was made going up to approximately the mid   portion of the patella.  The fat pad was excised and a minimal medial release   was performed.  The anterior horn of the medial meniscus was resected.  The   ACL was visualized and noted to be in good condition.  Lateral compartment was   visualized and was noted to be normal.  There was minimal degenerative change   on the undersurface of the patella.  There was significant degenerative   change on the medial femur.  It was felt appropriate to proceed with an Villal[place]   UKA.  Medium and large spoons were tried and a medium spoon gave excellent   fit.  This was placed in the medial compartment and the extramedullary tibial   guide was placed and hooked using a C-clamp.  This was appropriately   positioned against the proximal tibia and secured.  The sagittal tibial cut   was made followed by the transverse tibial cut and the bone fragment was   removed.  A size E tibia was felt to be appropriate and E tibial trial was   placed with a 4 paddle, which could fit easily.  The distal femur was   cannulated with a drill and awl and an intramedullary guide princess was placed.    The femoral alignment guide was placed and hooked to the intramedullary guide   and appropriately positioned.  The peg holes for the femur were drilled.  The   posterior femoral cutting guide was placed and a posterior femoral cut was   made.  The 0 spigot was placed and the initial femoral reaming was performed.    The femoral and tibial trials were placed.  A 5 paddle fit well in full   flexion ____ degrees of flexion.  Therefore, the 4 spigot was placed and the   second reaming was performed.  Excess bone was removed from around the femur.    The trials were again placed now showing a 4 paddle fitting well in both full   flexion and 20 degrees of flexion.  The tibial trial was appropriately   positioned and secured and  a toothbrush saw was used to make the slot for the   tibia.  The femoral cleanup guide was placed.  There were no posterior   osteophytes.  The reamer was used proximally to avoid impingement.  The trials   were placed and the 5 paddle actually fit well now in flexion and extension.    The trials were removed and drill holes were made in the femur for cement   fixation.  The wound was copiously irrigated with normal saline.  A batch of   high viscosity cement was mixed.  This was applied first to the tibia and   tibial component, which was placed and fully impacted with excess cement   removed.  It was then applied to the femur and femoral component, which was   placed and fully impacted with excess cement removed.  The 4 paddle was placed   and the knee was held in 45 degrees of flexion and varus to compress the   components while the cement fully cured.  Once cured, all cement was carefully   removed from around the prostheses.  The 5 paddle now fit the best in both   flexion and extension.  A 5 trial mobile bearing was placed giving full range   of motion with excellent tracking.  The wound was again irrigated and the   permanent 5 mm mobile bearing was placed.  The wound was again irrigated first   with Betadine, then with normal saline.  The incision was closed with #2 PDO   Quill suture in the extensor mechanism, 2-0 Monocryl in the subcutaneous   tissue and staples in the skin.  An Aquacel dressing was placed, held in place   with Ace wraps.  The patient was awakened, extubated in the operating room,   taken to recovery room in stable condition.  The tourniquet, which had been   inflated to 250 mmHg, was released.  Total tourniquet time was 68 minutes.    Sponge and needle counts were correct.  There were no identified   intraoperative complications.       ____________________________________     MD KALEY Stern / WILIAN    DD:  12/09/2019 11:36:54  DT:  12/09/2019 12:12:19    D#:  [id number]  Job#:   [id number]    cc: TEJAS LAZAR CST FA

## 2019-12-09 NOTE — PROGRESS NOTES
Received report from PACU RN. Pt arrived to unit via bed. Pt is a/o x4. Right knee dressing is CDI. Polar ice in place. Pt denies any numbness/tingingly at this time. Pt able to dorsal/plantar flex without difficulty. No complaints of N/V and pt is tolerating PO fluids. Pt reports tolerable pain at this time. Wife is at bedside. Call light within reach. Will continue to monitor.

## 2019-12-09 NOTE — OR NURSING
1200 REPORT RECEIVED FROM RANDA LAKE TO RESUME CARE OF THIS PT. VSS 1215 VSS. PT MEETS CRITERIA FOR TRANSFER TO ROOM.

## 2019-12-09 NOTE — OR NURSING
1139 Report received from ALEKSANDRA Alvarado. Pt sleeping in hospital bed. Surgical dressing to right knee, ice pack to surgical site. X rays completed.     1145 Pt reports 5/10 tolerable pain to his right knee. Pt denies nausea.     1200 No changes. Report back to ALEKSANDRA Alvarado.

## 2019-12-09 NOTE — ANESTHESIA POSTPROCEDURE EVALUATION
Patient: Brian Haynes    Procedure Summary     Date:  12/09/19 Room / Location:   OR  / SURGERY [facility]    Anesthesia Start:  0933 Anesthesia Stop:  1117    Procedure:  ARTHROPLASTY, KNEE, UNICOMPARTMENTAL - VERSUS (Right Knee) Diagnosis:  (OSTEOARTHRITIS OF KNEE)    Surgeon:  Olman Baez M.D. Responsible Provider:  Fermin Munoz M.D.    Anesthesia Type:  general, peripheral nerve block ASA Status:  2          Final Anesthesia Type: general, peripheral nerve block  Last vitals  BP   Blood Pressure: 128/71    Temp   36.8 °C (98.2 °F)    Pulse   Pulse: 71   Resp   14    SpO2   97 %      Anesthesia Post Evaluation    Patient location during evaluation: PACU  Patient participation: complete - patient participated  Level of consciousness: awake and alert  Pain score: 5    Airway patency: patent  Anesthetic complications: no  Cardiovascular status: hemodynamically stable  Respiratory status: acceptable  Hydration status: euvolemic    PONV: none           Nurse Pain Score: 5 (NPRS)

## 2019-12-09 NOTE — FLOWSHEET NOTE
12/09/19 1305   Events/Summary/Plan   Events/Summary/Plan IS and 02   Interdisciplinary Plan of Care-Goals (Indications)   Hyperinflation Protocol Indications   (post surgery)   Interdisciplinary Plan of Care-Outcomes    Hyperinflation Protocol Goals/Outcome Stable Vital Capacity x24 hrs and Patient Understands / uses I.S.   Education   Education Yes - Pt. / Family has been Instructed in use of Respiratory Equipment   Incentive Spirometry Group   Incentive Spirometry Instruction Yes   Breathing Exercises Yes   Incentive Spirometer Volume 3500 mL   Respiratory WDL   Respiratory (WDL) X   Chest Exam   Work Of Breathing / Effort Mild   Respiration 16   Pulse 65   Breath Sounds   Pre/Post Intervention Post Intervention Assessment   RUL Breath Sounds Clear   RML Breath Sounds Clear   RLL Breath Sounds Clear   NORMA Breath Sounds Clear   LLL Breath Sounds Clear   Oximetry   #Pulse Oximetry (Single Determination) Yes   Oxygen   Pulse Oximetry 100 %   O2 (LPM) 2.5   O2 Daily Delivery Respiratory  Nasal Cannula

## 2019-12-09 NOTE — OR SURGEON
Immediate Post OP Note    PreOp Diagnosis: Anteromedial OA right knee    PostOp Diagnosis: same      Procedure(s):  ARTHROPLASTY, KNEE, UNICOMPARTMENTAL - VERSUS - Wound Class: Clean    Surgeon(s):  Olman Baez M.D.  Assist Rigo    Anesthesiologist/Type of Anesthesia:  Anesthesiologist: Fermin Munoz M.D./General    Surgical Staff:  Circulator: Herb Nolan R.N.  Scrub Person: New Cho Assist: Fermin Sierra, C.N.A.    Specimens removed if any:  * No specimens in log *    Estimated Blood Loss: 50 cc    Findings: OA    Complications: none        12/9/2019 11:21 AM Olman Baez M.D.

## 2019-12-09 NOTE — ANESTHESIA PROCEDURE NOTES
Airway  Date/Time: 12/9/2019 9:39 AM  Performed by: Fermin Munoz M.D.  Authorized by: Fermin Munoz M.D.     Location:  OR  Urgency:  Elective  Difficult Airway: No    Indications for Airway Management:  Anesthesia  Spontaneous Ventilation: absent    Sedation Level:  Deep  Preoxygenated: Yes    MILS Maintained Throughout: No    Mask Difficulty Assessment:  0 - not attempted  Final Airway Type:  Supraglottic airway  Final Supraglottic Airway:  Standard LMA  SGA Size:  4  Number of Attempts at Approach:  1  Number of Other Approaches Attempted:  0

## 2019-12-09 NOTE — OR NURSING
1115 PT RECEIVED IN PACU, REPORT RECEIVED.  VSS, RESP SPONT, EVEN, NON LABORED.    1120 PT REPORT R KNEE PAIN 5/10. MEDICATE FOR PAIN. SEE MAR. VSS. 1128 PT SLEEPING, EASY TO ROUSE.  PT REPORTS R KNEE PAIN, TOLERABLE.  1131 XRAY AT BEDSIDE. 1139 REPORT GIVEN TO RANDA LAKE TO ASSUME CARE OF THIS PT. VSS

## 2019-12-09 NOTE — ANESTHESIA PROCEDURE NOTES
Peripheral Block  Date/Time: 12/9/2019 9:19 AM  Performed by: Fermin Munoz M.D.  Authorized by: Fermin Munoz M.D.     Patient Location:  Pre-op  Start Time:  12/9/2019 9:19 AM  End Time:  12/9/2019 9:24 AM  Reason for Block: at surgeon's request and post-op pain management    patient identified, IV checked, site marked, risks and benefits discussed, surgical consent, monitors and equipment checked, pre-op evaluation and timeout performed    Patient Position:  Supine  Prep: ChloraPrep    Monitoring:  Heart rate, continuous pulse ox and cardiac monitor  Block Region:  Lower Extremity  Lower Extremity - Block Type:  Selective FEMORAL nerve block at the Adductor Canal      Laterality:  Right  Procedures: ultrasound guided  Image captured, interpreted and electronically stored.  Local Infiltration:  Lidocaine  Strength:  2 %  Dose:  3 ml  Block Type:  Single-shot  Needle Length:  100mm  Needle Gauge:  21 G  Needle Localization:  Ultrasound guidance  Injection Assessment:  Negative aspiration for heme, no paresthesia on injection, incremental injection and local visualized surrounding nerve on ultrasound  Evidence of intravascular injection: No     US Guided Selective Femoral Nerve Block at Adductor Canal (RIGHT):   US probe placed at mid-thigh level on externally rotated leg and femur identified.  Probe directed medially until Sartorius Muscle (SM), Femoral Artery (FA) and Saphenous Nerve (SN) identified in Adductor Canal (AC).  Needle inserted anterolateral to probe in an in plane approach into a subsartorial perivascular perineural position.  After negative aspiration LA injected with ease and visualized spreading within the AC. Ultrasound image in patient file.

## 2019-12-09 NOTE — ANESTHESIA PREPROCEDURE EVALUATION
Right knee OA    Relevant Problems   CARDIAC   (+) Hypertension      Other   (+) Hyperlipidemia   GERD  Patient reports small airway and difficulty intubating    Physical Exam    Airway   Mallampati: II  TM distance: >3 FB  Neck ROM: full       Cardiovascular - normal exam  Rhythm: regular  Rate: normal  (-) murmur     Dental - normal exam         Pulmonary - normal exam  Breath sounds clear to auscultation     Abdominal    Neurological - normal exam                 Anesthesia Plan    ASA 2       Plan - general and peripheral nerve block     Peripheral nerve block will be post-op pain control  Airway plan will be LMA        Induction: intravenous    Postoperative Plan: Postoperative administration of opioids is intended.    Pertinent diagnostic labs and testing reviewed    Informed Consent:    Anesthetic plan and risks discussed with patient.    Use of blood products discussed with: patient whom consented to blood products.

## 2019-12-10 VITALS
HEIGHT: 70 IN | RESPIRATION RATE: 18 BRPM | SYSTOLIC BLOOD PRESSURE: 128 MMHG | HEART RATE: 71 BPM | OXYGEN SATURATION: 93 % | DIASTOLIC BLOOD PRESSURE: 78 MMHG | WEIGHT: 200.4 LBS | TEMPERATURE: 98 F | BODY MASS INDEX: 28.69 KG/M2

## 2019-12-10 PROBLEM — M17.11 LOCALIZED OSTEOARTHRITIS OF RIGHT KNEE: Status: ACTIVE | Noted: 2019-12-10

## 2019-12-10 LAB
ANION GAP SERPL CALC-SCNC: 13 MMOL/L (ref 0–11.9)
BUN SERPL-MCNC: 18 MG/DL (ref 8–22)
CALCIUM SERPL-MCNC: 8.5 MG/DL (ref 8.4–10.2)
CHLORIDE SERPL-SCNC: 100 MMOL/L (ref 96–112)
CO2 SERPL-SCNC: 22 MMOL/L (ref 20–33)
CREAT SERPL-MCNC: 1.08 MG/DL (ref 0.5–1.4)
ERYTHROCYTE [DISTWIDTH] IN BLOOD BY AUTOMATED COUNT: 41.1 FL (ref 35.9–50)
GLUCOSE SERPL-MCNC: 161 MG/DL (ref 65–99)
HCT VFR BLD AUTO: 37 % (ref 42–52)
HGB BLD-MCNC: 12.1 G/DL (ref 14–18)
MCH RBC QN AUTO: 30 PG (ref 27–33)
MCHC RBC AUTO-ENTMCNC: 32.7 G/DL (ref 33.7–35.3)
MCV RBC AUTO: 91.6 FL (ref 81.4–97.8)
PLATELET # BLD AUTO: 213 K/UL (ref 164–446)
PMV BLD AUTO: 8.4 FL (ref 9–12.9)
POTASSIUM SERPL-SCNC: 4.1 MMOL/L (ref 3.6–5.5)
RBC # BLD AUTO: 4.04 M/UL (ref 4.7–6.1)
SODIUM SERPL-SCNC: 135 MMOL/L (ref 135–145)
WBC # BLD AUTO: 15.4 K/UL (ref 4.8–10.8)

## 2019-12-10 PROCEDURE — 96372 THER/PROPH/DIAG INJ SC/IM: CPT

## 2019-12-10 PROCEDURE — A9270 NON-COVERED ITEM OR SERVICE: HCPCS | Performed by: ORTHOPAEDIC SURGERY

## 2019-12-10 PROCEDURE — 700112 HCHG RX REV CODE 229: Performed by: ORTHOPAEDIC SURGERY

## 2019-12-10 PROCEDURE — 36415 COLL VENOUS BLD VENIPUNCTURE: CPT

## 2019-12-10 PROCEDURE — 96376 TX/PRO/DX INJ SAME DRUG ADON: CPT

## 2019-12-10 PROCEDURE — 80048 BASIC METABOLIC PNL TOTAL CA: CPT

## 2019-12-10 PROCEDURE — 700111 HCHG RX REV CODE 636 W/ 250 OVERRIDE (IP): Performed by: ORTHOPAEDIC SURGERY

## 2019-12-10 PROCEDURE — 97161 PT EVAL LOW COMPLEX 20 MIN: CPT

## 2019-12-10 PROCEDURE — G0378 HOSPITAL OBSERVATION PER HR: HCPCS

## 2019-12-10 PROCEDURE — 700102 HCHG RX REV CODE 250 W/ 637 OVERRIDE(OP): Performed by: ORTHOPAEDIC SURGERY

## 2019-12-10 PROCEDURE — 700105 HCHG RX REV CODE 258: Performed by: ORTHOPAEDIC SURGERY

## 2019-12-10 PROCEDURE — 97110 THERAPEUTIC EXERCISES: CPT

## 2019-12-10 PROCEDURE — 85027 COMPLETE CBC AUTOMATED: CPT

## 2019-12-10 PROCEDURE — 97165 OT EVAL LOW COMPLEX 30 MIN: CPT

## 2019-12-10 PROCEDURE — 90686 IIV4 VACC NO PRSV 0.5 ML IM: CPT | Performed by: ORTHOPAEDIC SURGERY

## 2019-12-10 PROCEDURE — 90471 IMMUNIZATION ADMIN: CPT

## 2019-12-10 RX ADMIN — ACETAMINOPHEN 650 MG: 325 TABLET, FILM COATED ORAL at 01:09

## 2019-12-10 RX ADMIN — HYDROCHLOROTHIAZIDE 12.5 MG: 12.5 CAPSULE ORAL at 05:33

## 2019-12-10 RX ADMIN — KETOROLAC TROMETHAMINE 30 MG: 30 INJECTION, SOLUTION INTRAMUSCULAR at 08:01

## 2019-12-10 RX ADMIN — DOCUSATE SODIUM 100 MG: 100 CAPSULE, LIQUID FILLED ORAL at 05:33

## 2019-12-10 RX ADMIN — SODIUM CHLORIDE, SODIUM LACTATE, POTASSIUM CHLORIDE, CALCIUM CHLORIDE AND DEXTROSE MONOHYDRATE: 5; 600; 310; 30; 20 INJECTION, SOLUTION INTRAVENOUS at 05:34

## 2019-12-10 RX ADMIN — KETOROLAC TROMETHAMINE 30 MG: 30 INJECTION, SOLUTION INTRAMUSCULAR at 01:09

## 2019-12-10 RX ADMIN — CEFAZOLIN 2 G: 10 INJECTION, POWDER, FOR SOLUTION INTRAVENOUS; PARENTERAL at 01:09

## 2019-12-10 RX ADMIN — ACETAMINOPHEN 650 MG: 325 TABLET, FILM COATED ORAL at 05:33

## 2019-12-10 RX ADMIN — ASPIRIN 81 MG: 81 TABLET, COATED ORAL at 05:34

## 2019-12-10 RX ADMIN — INFLUENZA A VIRUS A/BRISBANE/02/2018 IVR-190 (H1N1) ANTIGEN (FORMALDEHYDE INACTIVATED), INFLUENZA A VIRUS A/KANSAS/14/2017 X-327 (H3N2) ANTIGEN (FORMALDEHYDE INACTIVATED), INFLUENZA B VIRUS B/PHUKET/3073/2013 ANTIGEN (FORMALDEHYDE INACTIVATED), AND INFLUENZA B VIRUS B/MARYLAND/15/2016 BX-69A ANTIGEN (FORMALDEHYDE INACTIVATED) 0.5 ML: 15; 15; 15; 15 INJECTION, SUSPENSION INTRAMUSCULAR at 10:31

## 2019-12-10 RX ADMIN — ENALAPRIL MALEATE 10 MG: 5 TABLET ORAL at 05:34

## 2019-12-10 ASSESSMENT — GAIT ASSESSMENTS
ASSISTIVE DEVICE: FRONT WHEEL WALKER
GAIT LEVEL OF ASSIST: SUPERVISED
DEVIATION: DECREASED HEEL STRIKE;DECREASED TOE OFF
DISTANCE (FEET): 100

## 2019-12-10 ASSESSMENT — COGNITIVE AND FUNCTIONAL STATUS - GENERAL
DRESSING REGULAR UPPER BODY CLOTHING: A LITTLE
EATING MEALS: A LITTLE
PERSONAL GROOMING: A LITTLE
DRESSING REGULAR LOWER BODY CLOTHING: A LITTLE
HELP NEEDED FOR BATHING: A LITTLE
DAILY ACTIVITIY SCORE: 18
TOILETING: A LITTLE
SUGGESTED CMS G CODE MODIFIER DAILY ACTIVITY: CK

## 2019-12-10 ASSESSMENT — ACTIVITIES OF DAILY LIVING (ADL): TOILETING: INDEPENDENT

## 2019-12-10 NOTE — PROGRESS NOTES
Patient discharged to home per order.  Reviewed all discharge instructions, appointments, discharge medications, and wound care instructions with patient and; they verbalize understanding.  Education provided in discharge instructions and Home Safety and Fall Prevention. Discharge paperwork completed; signed copies in chart.  Patient has education binder and all belongings; signed copy in chart.  Pt alert, calm, stable; no change in status from morning assessment.  Patient left facility at 1115 via wheel chair accompanied and; escorted to car by staff.

## 2019-12-10 NOTE — PROGRESS NOTES
Delivered and adjusted walker for pt, if pt has any questions regarding the walker or requires adjustments contact traction at [phone]

## 2019-12-10 NOTE — THERAPY
"Occupational Therapy Evaluation completed.   Plan of Care: Patient with no further skilled OT needs in the acute care setting at this time  Discharge Recommendations:  Equipment: Front-Wheel Walker Post-acute therapy Recommend outpatient occupational therapy services to address higher level deficits.      Patient at / near baseline. DC OT.     See \"Rehab Therapy-Acute\" Patient Summary Report for complete documentation.    "

## 2019-12-10 NOTE — FACE TO FACE
Face to Face Note  -  Durable Medical Equipment    Olman Baez M.D. - NPI: [id number]  I certify that this patient is under my care and that they had a durable medical equipment(DME)face to face encounter by myself that meets the physician DME face-to-face encounter requirements with this patient on:    Date of encounter:   Patient:                    MRN:                       YOB: 2017  Brian Christensen [facility]  [id number]  1955     The encounter with the patient was in whole, or in part, for the following medical condition, which is the primary reason for durable medical equipment:  Total Joint Replacement    I certify that, based on my findings, the following durable medical equipment is medically necessary:  Walkers.    HOME O2 Saturation Measurements:(Values must be present for Home Oxygen orders)         ,     ,         My Clinical findings support the need for the above equipment due to:  Abnormal Gait    Supporting Symptoms: post right [place] UKA

## 2019-12-10 NOTE — PROGRESS NOTES
"Post op day # 1    No New Complaints, mild pain, well controlled    Blood pressure (!) 99/67, pulse 74, temperature 36.6 °C (97.8 °F), temperature source Oral, resp. rate 20, height 1.778 m (5' 10\"), weight 90.9 kg (200 lb 6.4 oz), SpO2 93 %.    Neurovascular intact  Wound Clean and Dry    Recent Labs     12/10/19  0508   WBC 15.4*   RBC 4.04*   HEMOGLOBIN 12.1*   HEMATOCRIT 37.0*   MCV 91.6   MCH 30.0   MCHC 32.7*   RDW 41.1   PLATELETCT 213   MPV 8.4*     Recent Labs     12/10/19  0508   SODIUM 135   POTASSIUM 4.1   CHLORIDE 100   CO2 22   GLUCOSE 161*   BUN 18   CREATININE 1.08   CALCIUM 8.5       Plan: Home after a, PT.  WBAT on right, aggressive ROM right knee.  Please remove IV before discharge.  I will arrange outpt PT.  May shower with incision covered.  ASA 81 mg bid  "

## 2019-12-10 NOTE — PROGRESS NOTES
"Total Joint Replacement Program Rounding     Inpatient bedside rounding completed to address quality of care provided by total joint replacement program IDT and overall patient experience at [facility].    Patient Satisfaction addressed. Patient/family updated on POC during hospital stay.  Thorough safety education completed including use of call light prior to all mobility throughout the entirety of the hospital stay. Also educated on ankle pumps and incentive inspirometry use to prevent post-op complications.    No further questions/concerns at this time. Please see \"MyRounding\" for further details of rounding discussion. RN updated.         "

## 2019-12-10 NOTE — DISCHARGE INSTRUCTIONS
Discharge Instructions    Discharged to home by car with relative. Discharged via wheelchair, hospital escort: Yes.  Special equipment needed: Walker    Be sure to schedule a follow-up appointment with your primary care doctor or any specialists as instructed.     Discharge Plan:   Diet Plan: Discussed  Activity Level: Discussed  Confirmed Follow up Appointment: Patient to Call and Schedule Appointment  Confirmed Symptoms Management: Discussed  Medication Reconciliation Updated: Yes  Influenza Vaccine Indication: Indicated: 9 to 64 years of age    I understand that a diet low in cholesterol, fat, and sodium is recommended for good health. Unless I have been given specific instructions below for another diet, I accept this instruction as my diet prescription.   Other diet: regular    Special Instructions: Discharge instructions for the Orthopedic Patient    Follow up with Primary Care Physician within 2 weeks of discharge to home, regarding:  Review of medications and diagnostic testing.  Surveillance for medical complications.  Workup and treatment of osteoporosis, if appropriate.     -Is this a Joint Replacement patient? Yes Total Joint Knee Replacement Discharge Instructions    Pain  - The goal is to slowly wean off the prescription pain medicine.  - Ice can be used for pain control.  20 minutes at a time is recommended, and never directly against your skin or incision.  - Most patients are off the pain pills by 3 weeks; others may require a low level of pain medications for many months.  If your pain continues to be severe, follow up with your physician.  Infection    Knee joint infections; occur in fewer than 2% of patients. The most common causes of infection following total knee replacement surgery are from bacteria that enter the bloodstream during dental procedures, urinary tract infections, or skin infections. These bacteria can lodge around your knee replacement and cause an infection.  - Keep the incision  as clean and dry as possible.  - Always wash your hands before touching your incision.  - Skin infections tend to develop around 7-10 days after surgery; most can be treated with oral antibiotics.  - Dental Care should be delayed for 3 months after surgery, your surgeon recommends taking a dose of antibiotics 1 hour prior to any dental procedure. After 2 years, most surgeons recommend antibiotics only before an extensive procedure.  Ask your surgeon what he recommends.  - Signs and symptoms of infection can include:  low grade fever, redness, pain, swelling and drainage from your incision.  Notify your surgeon immediately if you develop any of these symptoms.  Other instructions  - Bowel habits - constipation is extremely common and is caused by a combination of anesthesia, lack of mobility and pain medicine.  Use stool softeners or laxatives if necessary. It is important not to ignore this problem, as bowel obstructions can be a serious complication after joint replacement surgery.  - Mood/Energy Level - Many patients experience a lack of energy and endurance for up to 2-3 months after surgery.  Some may also feel down and can even become depressed.  This is likely due to the postoperative anemia, change in activity level, lack of sleep, pain medicine and just the emotional reaction to the surgery itself that is a big disruption in a person’s life.  This usually passes.  If symptoms persist, follow up with your primary physician.  - Returning to work - Your surgeon will give you more specific instructions. Depending on the type of activities you perform, it may be 6 to 8 weeks before you return to work.   Generally, if you work a sedentary job requiring little standing or walking, most patients may return within 2-6 weeks.  Manual labor jobs involving walking, lifting and standing may take longer. Your surgeon’s office can provide a release to part-time or light duty work early on in your recovery and progress you  to full duty as able.    - Driving - If your left knee was replaced and you have an automatic transmission, you may be able to begin driving in a week or so, provided you are no longer taking narcotic pain medication. If your right knee was replaced, avoid driving for 6 to 8 weeks. Remember that your reflexes may not be as sharp as before your surgery. Ask your surgeon for specific instructions.   - Avoiding falls - A fall during the first few weeks after surgery can damage your new knee and may result in a need for further surgery.   throw rugs and tack down loose carpeting.  Be aware of floor hazards such as pets, small objects or uneven surfaces.    - Airport Metal Detectors - The sensitivity of metal detectors varies and it is likely that your prosthesis will cause an alarm.  Inform the  of your artificial joint.  Diet  - Resume your normal diet as tolerated.  - It is important to achieve a healthy nutritional status by eating a well balanced diet on a regular basis.  - Your physician may recommend that you take iron and vitamin supplements.   - Continue to drink plenty of fluids.  Shower/Bathing  - You may shower as soon as you get home from the hospital unless otherwise instructed.  - Keep your incision out of water.  To keep the incision dry when showering, cover it with a plastic bag or plastic wrap.  - Pat incision dry if it gets wet.  Don’t rub.  - Do not submerge in a bath until staples are out and the incision is completely healed. (Approximately 6-8 weeks)  Dressing Change:  Procedure (if recommended by your physician)  - Wash hands.  - Open all new dressing change materials.  - Remove old dressing and discard.  - Inspect incision for redness, increase in clear drainage, yellow/green drainage, odor and surrounding skin hot to touch.  -  ABD (large gauze) pad or “island dressing” by one corner and lay over the incision.  Be careful not to touch the inside of the dressing  that will lay over the incision.  - Secure in place as instructed (Ace wrap or tape).    Swelling/Bruising    - Swelling can last from 3-6 months.  - Elevate your leg higher than your heart while reclining.   The first week you are home you should elevate your leg an equal amount of time, as you are active.    - Anti-inflammatory pills can be taken once you have stopped the blood thinners.  - The swelling is usually worse after you go home since you are upright for longer periods of time.  - Bruising is common and can involve the entire leg including the thigh, calf and even foot. Bruising often does not appear until after you arrive home and it can be quite dramatic- purple, black, and green.  The bruising you can see is not usually concerning and will subside without any treatment.      Blood Clot Prevention  Blood clots in the legs and the less common, but frightening, clots that travel to the lungs are a real focus of our preventative. Most patients are at standard risk for them, but those patients who are at higher risk include people who have had previous clots, a family history of clotting, smoking, diabetes, obesity, advanced age, use of estrogen and a sedentary lifestyle.    - Signs of blood clots in legs - Swelling in thigh, calf or ankle that does not go down with elevation.  Pain, heat and tenderness in calf, back of calf or groin area.  NOTE: blood clots can occur in either leg.  - You have been receiving anticoagulant therapy (blood thinners) in the hospital and you may be instructed to continue at home depending on your risk factors.  - Your risk for developing a clot continues for up to 2-3 months after surgery.  You should avoid prolonged sitting and dehydration during that time (long air trips and car trips).  If you do take a trip during this time, please get up and move around every 1- 1.5 hours.  - If you are prescribed blood-thinning medication for home, follow instructions as directed.  (Handouts provided if applicable).      Activity  Once home, you should continue to stay active. The key is to remember not to overdo it! While you can expect some good days and some bad days, you should notice a gradual improvement and a gradual increase in your endurance over the next 6 to 12 months. Exercise is a critical component of home care, particularly during the first few weeks after surgery.     - Normal activities of daily living You should be able to resume most within 3 to 6 weeks following surgery. Some pain with activity and at night is common for several weeks after surgery  -  Physical Therapy Exercises - Continue to do the exercises prescribed for at least two months after surgery. Riding a stationary bicycle can help maintain muscle tone and keep your knee flexible. Try to achieve the maximum degree of bending and extension possible. (handout provided by Therapist).  - Sexual Activity -. Your surgeon can tell you when it safe to resume sexual activity.    - Sleeping Positions - You can safely sleep on your back, on either side, or on your stomach.   - Other Activities - Walk as much as you like, but remember that walking is no substitute for the exercises your doctor and physical therapist will prescribe. Lower impact activities are preferred.  If you have specific questions, consult your Surgeon.    When to Call the Doctor   Call the physician if:   - Fever over 100.5? F  - Increased pain, drainage, redness, odor or heat around the incision area  - Shaking chills  - Increased knee pain with activity and rest  - Increased pain in calf, tenderness or redness above or below the knee  - Increased swelling of calf, ankle, foot  - Sudden increased shortness of breath, sudden onset of chest pain, localized chest pain with coughing  - Incision opening  Or, if there are any questions or concerns about medications or care.       -Is this patient being discharged with medication to prevent blood  clots?  Yes, Aspirin 81 mg twice a day     Aspirin, ASA oral tablets  What is this medicine?  ASPIRIN (AS pir in) is a pain reliever. It is used to treat mild pain and fever. This medicine is also used as directed by a doctor to prevent and to treat heart attacks, to prevent strokes, and to treat arthritis or inflammation.  This medicine may be used for other purposes; ask your health care provider or pharmacist if you have questions.  COMMON BRAND NAME(S): Aspir-Low, Aspir-Beth, Aspirtab, Leslie Advanced Aspirin, Leslie Aspirin, Leslie Aspirin Extra Strength, Leslie Aspirin Plus, Leslie Extra Strength, Leslie Extra Strength Plus, Leslie Genuine Aspirin, Leslie Womens Aspirin, Bufferin, Bufferin Extra Strength, Bufferin Low Dose  What should I tell my health care provider before I take this medicine?  They need to know if you have any of these conditions:  -anemia  -asthma  -bleeding problems  -child with chickenpox, the flu, or other viral infection  -diabetes  -gout  -if you frequently drink alcohol containing drinks  -kidney disease  -liver disease  -low level of vitamin K  -lupus  -smoke tobacco  -stomach ulcers or other problems  -an unusual or allergic reaction to aspirin, tartrazine dye, other medicines, dyes, or preservatives  -pregnant or trying to get pregnant  -breast-feeding  How should I use this medicine?  Take this medicine by mouth with a glass of water. Follow the directions on the package or prescription label. You can take this medicine with or without food. If it upsets your stomach, take it with food. Do not take your medicine more often than directed.  Talk to your pediatrician regarding the use of this medicine in children. While this drug may be prescribed for children as young as 12 years of age for selected conditions, precautions do apply. Children and teenagers should not use this medicine to treat chicken pox or flu symptoms unless directed by a doctor.  Patients over 65 years old may have a  stronger reaction and need a smaller dose.  Overdosage: If you think you have taken too much of this medicine contact a poison control center or emergency room at once.  NOTE: This medicine is only for you. Do not share this medicine with others.  What if I miss a dose?  If you are taking this medicine on a regular schedule and miss a dose, take it as soon as you can. If it is almost time for your next dose, take only that dose. Do not take double or extra doses.  What may interact with this medicine?  Do not take this medicine with any of the following medications:  -cidofovir  -ketorolac  -probenecid  This medicine may also interact with the following medications:  -alcohol  -alendronate  -bismuth subsalicylate  -flavocoxid  -herbal supplements like feverfew, garlic, jodi, ginkgo biloba, horse chestnut  -medicines for diabetes or glaucoma like acetazolamide, methazolamide  -medicines for gout  -medicines that treat or prevent blood clots like enoxaparin, heparin, ticlopidine, warfarin  -other aspirin and aspirin-like medicines  -NSAIDs, medicines for pain and inflammation, like ibuprofen or naproxen  -pemetrexed  -sulfinpyrazone  -varicella live vaccine  This list may not describe all possible interactions. Give your health care provider a list of all the medicines, herbs, non-prescription drugs, or dietary supplements you use. Also tell them if you smoke, drink alcohol, or use illegal drugs. Some items may interact with your medicine.  What should I watch for while using this medicine?  If you are treating yourself for pain, tell your doctor or health care professional if the pain lasts more than 10 days, if it gets worse, or if there is a new or different kind of pain. Tell your doctor if you see redness or swelling. Also, check with your doctor if you have a fever that lasts for more than 3 days. Only take this medicine to prevent heart attacks or blood clotting if prescribed by your doctor or health care  professional.  Do not take aspirin or aspirin-like medicines with this medicine. Too much aspirin can be dangerous. Always read the labels carefully.  This medicine can irritate your stomach or cause bleeding problems. Do not smoke cigarettes or drink alcohol while taking this medicine. Do not lie down for 30 minutes after taking this medicine to prevent irritation to your throat.  If you are scheduled for any medical or dental procedure, tell your healthcare provider that you are taking this medicine. You may need to stop taking this medicine before the procedure.  This medicine may be used to treat migraines. If you take migraine medicines for 10 or more days a month, your migraines may get worse. Keep a diary of headache days and medicine use. Contact your healthcare professional if your migraine attacks occur more frequently.  What side effects may I notice from receiving this medicine?  Side effects that you should report to your doctor or health care professional as soon as possible:  -allergic reactions like skin rash, itching or hives, swelling of the face, lips, or tongue  -breathing problems  -changes in hearing, ringing in the ears  -confusion  -general ill feeling or flu-like symptoms  -pain on swallowing  -redness, blistering, peeling or loosening of the skin, including inside the mouth or nose  -signs and symptoms of bleeding such as bloody or black, tarry stools; red or dark-brown urine; spitting up blood or brown material that looks like coffee grounds; red spots on the skin; unusual bruising or bleeding from the eye, gums, or nose  -trouble passing urine or change in the amount of urine  -unusually weak or tired  -yellowing of the eyes or skin  Side effects that usually do not require medical attention (report to your doctor or health care professional if they continue or are bothersome):  -diarrhea or constipation  -headache  -nausea, vomiting  -stomach gas, heartburn  This list may not describe  all possible side effects. Call your doctor for medical advice about side effects. You may report side effects to FDA at 1-593-EBT-7407.  Where should I keep my medicine?  Keep out of the reach of children.  Store at room temperature between 15 and 30 degrees C (59 and 86 degrees F). Protect from heat and moisture. Do not use this medicine if it has a strong vinegar smell. Throw away any unused medicine after the expiration date.  NOTE: This sheet is a summary. It may not cover all possible information. If you have questions about this medicine, talk to your doctor, pharmacist, or health care provider.  © 2018 [employer]/Gold Standard (2014-08-19 11:30:31)      · Is patient discharged on Warfarin / Coumadin?   No     Depression / Suicide Risk    As you are discharged from this [facility] facility, it is important to learn how to keep safe from harming yourself.    Recognize the warning signs:  · Abrupt changes in personality, positive or negative- including increase in energy   · Giving away possessions  · Change in eating patterns- significant weight changes-  positive or negative  · Change in sleeping patterns- unable to sleep or sleeping all the time   · Unwillingness or inability to communicate  · Depression  · Unusual sadness, discouragement and loneliness  · Talk of wanting to die  · Neglect of personal appearance   · Rebelliousness- reckless behavior  · Withdrawal from people/activities they love  · Confusion- inability to concentrate     If you or a loved one observes any of these behaviors or has concerns about self-harm, here's what you can do:  · Talk about it- your feelings and reasons for harming yourself  · Remove any means that you might use to hurt yourself (examples: pills, rope, extension cords, firearm)  · Get professional help from the community (Mental Health, Substance Abuse, psychological counseling)  · Do not be alone:Call your Safe Contact- someone whom you trust who will be there for  you.  · Call your local CRISIS HOTLINE 224-1093 or 871-991-9931  · Call your local Children's Mobile Crisis Response Team [place] (853) 993-2506 or www.[employer]  · Call the toll free National Suicide Prevention Hotlines   · National Suicide Prevention Lifeline 436-359-QBVQ (0153)  · National [employer] Line Network 800-SUICIDE (183-4452)

## 2019-12-10 NOTE — DISCHARGE PLANNING
Anticipated Discharge Disposition: Home with wife    Action: Received order for FWW. LSW met with pt at bedside. Discussed d/c planning and completed assessment. Pt is agreeable to FWW and does not have a choice. Pt provided verbal for [facility]. LSW faxed DME choice form to [facility] and bedside RN will place traction order. Pt lives with his wife who is supportive and was IPTA.    Barriers to Discharge: N/A.     Plan: As above, anticipate d/c home. FWW to be delivered to bedside.       Care Transition Team Assessment    Information Source  Orientation : Oriented x 4(Simultaneous filing. User may not have seen previous data.)  Information Given By: Patient  Informant's Name: Ron  Who is responsible for making decisions for patient? : Patient    Readmission Evaluation  Is this a readmission?: No    Elopement Risk  Legal Hold: No  Ambulatory or Self Mobile in Wheelchair: No-Not an Elopement Risk    Interdisciplinary Discharge Planning  Does Admitting Nurse Feel This Could be a Complex Discharge?: No  Primary Care Physician: Pushpa  Lives with - Patient's Self Care Capacity: Spouse  Patient or legal guardian wants to designate a caregiver (see row info): No  Support Systems: Spouse / Significant Other  Housing / Facility: [address] step)  Do You Take your Prescribed Medications Regularly: Yes  Able to Return to Previous ADL's: Future Time w/Therapy  Mobility Issues: No  Prior Services: None  Assistance Needed: No  Durable Medical Equipment: Other - Specify, Walker(regular FWW and walker with seat and brakes)  DME Provider / Phone: [facility]     Discharge Preparedness  What is your plan after discharge?: Home with help  What are your discharge supports?: Spouse  Prior Functional Level: Ambulatory, Drives Self, Independent with Activities of Daily Living, Independent with Medication Management  Difficulity with ADLs: None  Difficulity with IADLs: None    Functional Assesment  Prior Functional Level:  Ambulatory, Drives Self, Independent with Activities of Daily Living, Independent with Medication Management    Finances  Financial Barriers to Discharge: No  Prescription Coverage: Yes    Vision / Hearing Impairment  Vision Impairment : Yes  Right Eye Vision: Impaired, Wears Glasses  Left Eye Vision: Impaired, Wears Glasses  Hearing Impairment : No              Domestic Abuse  Have you ever been the victim of abuse or violence?: No  Physical Abuse or Sexual Abuse: No  Verbal Abuse or Emotional Abuse: No  Possible Abuse Reported to:: Not Applicable              Anticipated Discharge Information  Anticipated discharge disposition: DME, Home  Discharge Address: [address] [place] [address] [place] [id number]  Discharge Contact Phone Number: [phone]

## 2019-12-10 NOTE — PROGRESS NOTES
Received report from ALEKSANDRA Shah. Assumed care of patient. Patient is currently sitting up in bed with no pain to the right knee. Patient requesting to go to the bathroom and to walk. No N/V reported at this time and patient tolerating solids and liquids at this time. Patient has walked 50 feet and urinated with trouble. Discussed plan of care with patient. Right knee visualized. Patient CMS intact and dressing CDI. Polar ice in place and SCDs on and working. PIV working and patent and saline locked. Patient WBAT with a FWW. Hourly rounding in place. No additional needs at this time. Bed is locked and in lowest position with call light within reach. Patient instructed to call before getting out of bed.

## 2019-12-10 NOTE — DISCHARGE PLANNING
Received Choice form at [phone]  Agency/Facility Name: [facility]  Referral sent per Choice form @ [phone]

## 2019-12-10 NOTE — THERAPY
"Physical Therapy Evaluation completed.   Bed Mobility:  Supine to Sit: Supervised  Transfers: Sit to Stand: Supervised  Gait: Level Of Assist: Supervised with Front-Wheel Walker       Plan of Care: Patient with no further skilled PT needs in the acute care setting at this time  Discharge Recommendations: Equipment: Front-Wheel Walker. Post-acute therapy Discharge to home with outpatient PT for additional skilled therapy services.    Pt is a 65 yo male s/p R unicompartmental knee arthroplasty POD#1. PT is able to ambulate safely with FWW, HEP reviewed. Pt has supportive spouse at home with f/u PT this Thursday. No further acute PT needs, DC PT.    See \"Rehab Therapy-Acute\" Patient Summary Report for complete documentation.     "

## 2019-12-11 NOTE — DISCHARGE SUMMARY
DATE OF ADMISSION:  12/09/2019    DATE OF DISCHARGE:  12/10/2019    ADMITTING DIAGNOSIS:  Anteromedial osteoarthritis, right knee.    DISCHARGE DIAGNOSIS:  Anteromedial osteoarthritis, right knee.    PROCEDURE:  Right [place] unicompartmental knee replacement on 12/09/2019.    INFECTIONS:  None.    COMPLICATIONS:  None.    CONDITION ON DISCHARGE:  Stable.    ADMITTING INFORMATION:  Please see dictated note.    HOSPITAL COURSE:  After appropriate preoperative evaluation, obtaining   informed consent, the patient was taken to the operating room on 12/09/2019   where the above listed procedure was performed under general anesthetic agent.    The patient tolerated the procedure well and was taken to recovery room,   then to the floor in stable condition.  He received 24 hours of perioperative   Ancef and was placed on aspirin as well as use of mechanical prophylaxis for   DVT prevention.  He was begun on ambulation on the afternoon of surgery as   well as range of motion.  By postop day #1, was comfortable on p.o. pain   medication and independently ambulatory with his walker.  He has a range of   0/0/100 in the right knee today with good quad tone, soft and nontender calf,   and intact distal neurologic and vascular exam.  A clean, dry wound and stable   labs.  He will be discharged home following morning therapy on postoperative   day #1.    DISPOSITION:  The patient will be discharged to home today and will return to   see me in 2 weeks for a wound check and staple removal.  He has been   encouraged to work aggressively on range of motion to the right knee, both   flexion and extension and maybe weightbearing as tolerated.  He may shower   with his Aquacel dressing in place.  This should be removed in approximately 5   days and wound covered with a gauze dressing.  The wound should be kept clean   and dry and we should be notified if there are problems with increasing pain,   drainage, evidence of infection,  neurovascular compromise or other   significant concern.  The patient does have a prescription for pain medication   at home and will resume his normal preoperative home medications.  He will   take an aspirin 81 mg p.o. b.i.d. for DVT prevention.       ____________________________________     MD KALEY Stern / WILIAN    DD:  12/10/2019 08:41:58  DT:  12/11/2019 03:31:07    D#:  [id number]  Job#:  [id number]

## 2020-05-13 ENCOUNTER — HOSPITAL ENCOUNTER (OUTPATIENT)
Dept: RADIOLOGY | Facility: MEDICAL CENTER | Age: 65
End: 2020-05-13
Attending: UROLOGY
Payer: COMMERCIAL

## 2020-05-13 DIAGNOSIS — C61 MALIGNANT NEOPLASM OF PROSTATE (HCC): ICD-10-CM

## 2020-05-13 PROCEDURE — A9503 TC99M MEDRONATE: HCPCS

## 2020-06-18 ENCOUNTER — APPOINTMENT (OUTPATIENT)
Dept: RADIOLOGY | Facility: MEDICAL CENTER | Age: 65
End: 2020-06-18
Attending: UROLOGY
Payer: COMMERCIAL

## 2020-06-25 ENCOUNTER — HOSPITAL ENCOUNTER (OUTPATIENT)
Dept: RADIOLOGY | Facility: MEDICAL CENTER | Age: 65
End: 2020-06-25
Attending: UROLOGY
Payer: COMMERCIAL

## 2020-06-25 DIAGNOSIS — C61 MALIGNANT NEOPLASM OF PROSTATE (HCC): ICD-10-CM

## 2020-06-25 PROCEDURE — A9588 FLUCICLOVINE F-18: HCPCS

## 2020-10-09 ENCOUNTER — HOSPITAL ENCOUNTER (OUTPATIENT)
Dept: LAB | Facility: MEDICAL CENTER | Age: 65
End: 2020-10-09
Attending: FAMILY MEDICINE
Payer: COMMERCIAL

## 2020-10-09 LAB
COVID ORDER STATUS COVID19: NORMAL
SARS-COV-2 RNA RESP QL NAA+PROBE: NOTDETECTED
SPECIMEN SOURCE: NORMAL

## 2020-10-09 PROCEDURE — C9803 HOPD COVID-19 SPEC COLLECT: HCPCS

## 2020-10-09 PROCEDURE — U0003 INFECTIOUS AGENT DETECTION BY NUCLEIC ACID (DNA OR RNA); SEVERE ACUTE RESPIRATORY SYNDROME CORONAVIRUS 2 (SARS-COV-2) (CORONAVIRUS DISEASE [COVID-19]), AMPLIFIED PROBE TECHNIQUE, MAKING USE OF HIGH THROUGHPUT TECHNOLOGIES AS DESCRIBED BY CMS-2020-01-R: HCPCS

## 2021-12-02 ENCOUNTER — OFFICE VISIT (OUTPATIENT)
Dept: URGENT CARE | Facility: CLINIC | Age: 66
End: 2021-12-02
Payer: COMMERCIAL

## 2021-12-02 ENCOUNTER — HOSPITAL ENCOUNTER (OUTPATIENT)
Facility: MEDICAL CENTER | Age: 66
End: 2021-12-02
Attending: NURSE PRACTITIONER
Payer: COMMERCIAL

## 2021-12-02 VITALS
RESPIRATION RATE: 12 BRPM | TEMPERATURE: 96.6 F | OXYGEN SATURATION: 98 % | SYSTOLIC BLOOD PRESSURE: 110 MMHG | HEART RATE: 71 BPM | HEIGHT: 70 IN | WEIGHT: 195.9 LBS | DIASTOLIC BLOOD PRESSURE: 74 MMHG | BODY MASS INDEX: 28.05 KG/M2

## 2021-12-02 DIAGNOSIS — J02.9 SORE THROAT: ICD-10-CM

## 2021-12-02 PROCEDURE — 99203 OFFICE O/P NEW LOW 30 MIN: CPT | Performed by: NURSE PRACTITIONER

## 2021-12-02 PROCEDURE — 87070 CULTURE OTHR SPECIMN AEROBIC: CPT

## 2021-12-02 RX ORDER — OXYCODONE AND ACETAMINOPHEN 10; 325 MG/1; MG/1
TABLET ORAL
COMMUNITY
End: 2021-12-02

## 2021-12-02 RX ORDER — ABIRATERONE ACETATE 250 MG/1
1000 TABLET ORAL
COMMUNITY

## 2021-12-02 RX ORDER — AMOXICILLIN AND CLAVULANATE POTASSIUM 875; 125 MG/1; MG/1
TABLET, FILM COATED ORAL
COMMUNITY
End: 2021-12-02

## 2021-12-02 RX ORDER — OXYCODONE HYDROCHLORIDE AND ACETAMINOPHEN 5; 325 MG/1; MG/1
TABLET ORAL
COMMUNITY
End: 2021-12-02

## 2021-12-02 RX ORDER — PREDNISONE 5 MG/1
5 TABLET ORAL DAILY
COMMUNITY
End: 2023-06-07

## 2021-12-02 RX ORDER — RELUGOLIX 120 MG/1
120 TABLET, FILM COATED ORAL DAILY
Status: ON HOLD | COMMUNITY
End: 2023-06-09

## 2021-12-02 ASSESSMENT — ENCOUNTER SYMPTOMS
FEVER: 0
CHILLS: 0
COUGH: 1

## 2021-12-03 NOTE — PROGRESS NOTES
Subjective     Ron Haynes is a 66 y.o. male who presents with Pharyngitis (swollen lymph nodes/cough x3 weeks )    Past Medical History:   Diagnosis Date   • Anesthesia     Pt reports small airway and difficulty intubating.   • Arthritis     Osteo to knee and neck and fingers   • Cancer (HCC) 10/2013    Prostate-prostatectomy and radiation x2(recently cyber knife 8/2018)   • Dermatitis    • Disorder of thyroid     Hashimotos (last blood test said no)-no current meds   • Hayfever    • Heart burn     Treated with pepcid.   • High cholesterol    • Hypertension    • Indigestion     Treated with pepcid.   • Migraines     3-4 per year   • Right knee pain 12/05/2019     Social History     Socioeconomic History   • Marital status:      Spouse name: Not on file   • Number of children: Not on file   • Years of education: Not on file   • Highest education level: Not on file   Occupational History   • Not on file   Tobacco Use   • Smoking status: Never Smoker   • Smokeless tobacco: Never Used   Vaping Use   • Vaping Use: Never used   Substance and Sexual Activity   • Alcohol use: Yes     Comment: 2 per month   • Drug use: Never   • Sexual activity: Yes     Partners: Female   Other Topics Concern   • Not on file   Social History Narrative   • Not on file     Social Determinants of Health     Financial Resource Strain:    • Difficulty of Paying Living Expenses: Not on file   Food Insecurity:    • Worried About Running Out of Food in the Last Year: Not on file   • Ran Out of Food in the Last Year: Not on file   Transportation Needs:    • Lack of Transportation (Medical): Not on file   • Lack of Transportation (Non-Medical): Not on file   Physical Activity:    • Days of Exercise per Week: Not on file   • Minutes of Exercise per Session: Not on file   Stress:    • Feeling of Stress : Not on file   Social Connections:    • Frequency of Communication with Friends and Family: Not on file   • Frequency of Social Gatherings  with Friends and Family: Not on file   • Attends [employer] Services: Not on file   • Active Member of Clubs or Organizations: Not on file   • Attends Club or Organization Meetings: Not on file   • Marital Status: Not on file   Intimate Partner Violence:    • Fear of Current or Ex-Partner: Not on file   • Emotionally Abused: Not on file   • Physically Abused: Not on file   • Sexually Abused: Not on file   Housing Stability:    • Unable to Pay for Housing in the Last Year: Not on file   • Number of Places Lived in the Last Year: Not on file   • Unstable Housing in the Last Year: Not on file     Family History   Problem Relation Age of Onset   • Hypertension Mother    • Lung Disease Father    • Cancer Father    • Prostate cancer Father    • Thyroid Father    • Hypertension Father    • Allergies Father    • Arthritis Father        Allergies: Patient has no known allergies.    Patient is a 66-year-old man who presents today with complaint of sore throat over the last 3 to 5 days.  Patient states that about 3 weeks ago his dentist gave him 3 days of antibiotics for an unrelated problem.  Patient happened to have a sore throat at that time and reports it cleared up with the antibiotics.  When he went off of the antibiotics, he noted that his throat became sore again and is worried that he may have a bacterial infection.  No fever, aches, or chills.  Endorses fatigue and malaise.  No shortness of breath or cough            Pharyngitis   This is a new problem. The problem has been unchanged. Neither side of throat is experiencing more pain than the other. There has been no fever. Associated symptoms include coughing. He has tried nothing for the symptoms. The treatment provided no relief.       Review of Systems   Constitutional: Negative for chills, fever and malaise/fatigue.   Respiratory: Positive for cough.    All other systems reviewed and are negative.             Objective     /74 (BP Location: Left arm, Patient  "Position: Sitting, BP Cuff Size: Adult)   Pulse 71   Temp 35.9 °C (96.6 °F) (Temporal)   Resp 12   Ht 1.778 m (5' 10\")   Wt 88.9 kg (195 lb 14.4 oz)   SpO2 98%   BMI 28.11 kg/m²      Physical Exam  Vitals reviewed.   Constitutional:       Appearance: Normal appearance.   HENT:      Right Ear: Tympanic membrane, ear canal and external ear normal.      Left Ear: Tympanic membrane, ear canal and external ear normal.      Nose: Nose normal.      Mouth/Throat:      Mouth: Mucous membranes are moist.   Eyes:      Extraocular Movements: Extraocular movements intact.      Conjunctiva/sclera: Conjunctivae normal.      Pupils: Pupils are equal, round, and reactive to light.   Cardiovascular:      Rate and Rhythm: Normal rate and regular rhythm.      Heart sounds: Normal heart sounds.   Pulmonary:      Effort: Pulmonary effort is normal.      Breath sounds: Normal breath sounds.   Musculoskeletal:         General: Normal range of motion.      Cervical back: Normal range of motion and neck supple.   Skin:     General: Skin is warm.   Neurological:      Mental Status: He is alert and oriented to person, place, and time.   Psychiatric:         Mood and Affect: Mood normal.         Behavior: Behavior normal.                             Assessment & Plan   Sore throat    Throat culture obtained   We will notify with results  If negative throat culture and symptoms persist we will consider referral to ENT at that time.    Warm saltwater gargles           There are no diagnoses linked to this encounter.              "

## 2021-12-05 LAB
BACTERIA SPEC RESP CULT: NORMAL
SIGNIFICANT IND 70042: NORMAL
SITE SITE: NORMAL
SOURCE SOURCE: NORMAL

## 2022-04-14 ENCOUNTER — OFFICE VISIT (OUTPATIENT)
Dept: URGENT CARE | Facility: CLINIC | Age: 67
End: 2022-04-14
Payer: COMMERCIAL

## 2022-04-14 VITALS
WEIGHT: 198.4 LBS | BODY MASS INDEX: 28.4 KG/M2 | OXYGEN SATURATION: 95 % | RESPIRATION RATE: 16 BRPM | DIASTOLIC BLOOD PRESSURE: 62 MMHG | SYSTOLIC BLOOD PRESSURE: 120 MMHG | TEMPERATURE: 97.5 F | HEIGHT: 70 IN | HEART RATE: 86 BPM

## 2022-04-14 DIAGNOSIS — R09.82 PND (POST-NASAL DRIP): ICD-10-CM

## 2022-04-14 DIAGNOSIS — J01.40 ACUTE PANSINUSITIS, RECURRENCE NOT SPECIFIED: ICD-10-CM

## 2022-04-14 DIAGNOSIS — H92.02 OTALGIA OF LEFT EAR: ICD-10-CM

## 2022-04-14 DIAGNOSIS — H69.92 DYSFUNCTION OF LEFT EUSTACHIAN TUBE: ICD-10-CM

## 2022-04-14 PROCEDURE — 99214 OFFICE O/P EST MOD 30 MIN: CPT | Performed by: NURSE PRACTITIONER

## 2022-04-14 RX ORDER — AMOXICILLIN AND CLAVULANATE POTASSIUM 875; 125 MG/1; MG/1
1 TABLET, FILM COATED ORAL 2 TIMES DAILY
Qty: 14 TABLET | Refills: 0 | Status: SHIPPED | OUTPATIENT
Start: 2022-04-14 | End: 2022-04-21

## 2022-04-14 NOTE — PROGRESS NOTES
"Brian Haynes is a 66 y.o. male who presents for Sinusitis (\"Left ear started hurting last night\" Sinus pain 2x weeks )      CHANI Torres is a 66-year-old male who presents with acute sinus pain.  He is currently having pain in his left ear.  It hurt him all night long.  He has had sinus discomfort for 2 weeks.  He has a history of previous sinus infections.  This feels like he has a sinus infection that needs to be treated.  He complains of sinus pain, teeth pain, ear pressure, nasal drainage that has become thick, mild sore throat.  Denies dizziness, eye drainage, vision change.  No other aggravating leaving factors.    ROS    Allergies:     No Known Allergies    PMSFS Hx:  Past Medical History:   Diagnosis Date   • Anesthesia     Pt reports small airway and difficulty intubating.   • Arthritis     Osteo to knee and neck and fingers   • Cancer (HCC) 10/2013    Prostate-prostatectomy and radiation x2(recently cyber knife 8/2018)   • Dermatitis    • Disorder of thyroid     Hashimotos (last blood test said no)-no current meds   • Hayfever    • Heart burn     Treated with pepcid.   • High cholesterol    • Hypertension    • Indigestion     Treated with pepcid.   • Migraines     3-4 per year   • Right knee pain 12/05/2019     Past Surgical History:   Procedure Laterality Date   • PB PLASTY KNEE,MED OR LAT COMPARTMT Right 12/9/2019    Procedure: ARTHROPLASTY, KNEE, UNICOMPARTMENTAL - VERSUS;  Surgeon: Olman Baez M.D.;  Location: [facility];  Service: Orthopedics   • KNEE ARTHROSCOPY Right 9/13/2018    Procedure: KNEE ARTHROSCOPY;  Surgeon: Olman Baez M.D.;  Location: [facility];  Service: Orthopedics   • MENISCECTOMY, KNEE, MEDIAL Right 9/13/2018    Procedure: MEDIAL MENISCECTOMY - PARTIAL, KEITH;  Surgeon: Olman Baez M.D.;  Location: [facility];  Service: Orthopedics   • BLADDER NECK CONTRACTURE EXICISION  07/2018   • INGUINAL HERNIA REPAIR Left " "4/13/2017    Procedure: INGUINAL HERNIA REPAIR- OPEN;  Surgeon: Selvin Peña M.D.;  Location: SURGERY [facility];  Service:    • HERNIA REPAIR, INCISIONAL, UNILATERAL Left 11/2014    and by hunter scar   • COLONOSCOPY  2014   • PROSTATECTOMY, RADIAL  10/2013    robotic for cancer   • HUNTER BY LAPAROSCOPY  04/2010   • SEPTOPLASTY  1997   • DENTAL EXTRACTION(S)  1971   • TONSILLECTOMY  1959     Family History   Problem Relation Age of Onset   • Hypertension Mother    • Lung Disease Father    • Cancer Father    • Prostate cancer Father    • Thyroid Father    • Hypertension Father    • Allergies Father    • Arthritis Father      Social History     Tobacco Use   • Smoking status: Never Smoker   • Smokeless tobacco: Never Used   Substance Use Topics   • Alcohol use: Yes     Comment: 2 per month       Problems:   Patient Active Problem List   Diagnosis   • Hypertension   • Hyperlipidemia   • Incarcerated hernia   • Acute medial meniscus tear, right, initial encounter   • Localized osteoarthritis of right knee       Medications:   Current Outpatient Medications on File Prior to Visit   Medication Sig Dispense Refill   • Abiraterone Acetate 250 MG Tab abiraterone 250 mg tablet     • Relugolix (ORGOVYX) 120 MG Tab Orgovyx 120 mg tablet     • predniSONE (DELTASONE) 5 MG Tab prednisone 5 mg tablet     • sumatriptan (IMITREX) 100 MG tablet Take 100 mg by mouth as needed.     • famotidine (PEPCID) 20 MG Tab Take 20 mg by mouth every bedtime.     • hydrochlorothiazide (MICROZIDE) 12.5 MG capsule TAKE 1 CAP BY MOUTH EVERY DAY. 30 Cap 0   • enalapril (VASOTEC) 10 MG TABS TAKE 1 TAB BY MOUTH 2 TIMES A DAY. 30 Each 0     No current facility-administered medications on file prior to visit.          Objective:     /62 (BP Location: Left arm, Patient Position: Sitting, BP Cuff Size: Adult)   Pulse 86   Temp 36.4 °C (97.5 °F) (Temporal)   Resp 16   Ht 1.778 m (5' 10\")   Wt 90 kg (198 lb 6.4 oz)   SpO2 95%   BMI 28.47 " kg/m²     Physical Exam  Vitals and nursing note reviewed.   Constitutional:       General: He is not in acute distress.     Appearance: He is well-developed. He is not ill-appearing or toxic-appearing.   HENT:      Head: Normocephalic.      Right Ear: Hearing, ear canal and external ear normal. Tympanic membrane is bulging.      Left Ear: Hearing, ear canal and external ear normal. Tympanic membrane is bulging.      Nose: Mucosal edema present.      Mouth/Throat:      Mouth: Mucous membranes are moist.      Pharynx: Uvula midline. Oropharyngeal exudate (pnd) and posterior oropharyngeal erythema present.   Eyes:      General: Lids are normal.      Pupils: Pupils are equal, round, and reactive to light.   Cardiovascular:      Rate and Rhythm: Normal rate and regular rhythm.      Pulses: Normal pulses.      Heart sounds: Normal heart sounds.   Pulmonary:      Effort: Pulmonary effort is normal. No respiratory distress.      Breath sounds: Normal breath sounds.   Abdominal:      Palpations: Abdomen is soft.   Musculoskeletal:         General: Normal range of motion.      Cervical back: Full passive range of motion without pain, normal range of motion and neck supple.   Lymphadenopathy:      Cervical: No cervical adenopathy.   Skin:     General: Skin is warm and dry.      Capillary Refill: Capillary refill takes less than 2 seconds.   Neurological:      Mental Status: He is alert and oriented to person, place, and time.   Psychiatric:         Mood and Affect: Mood normal.         Speech: Speech normal.         Behavior: Behavior normal.         Thought Content: Thought content normal.         Judgment: Judgment normal.         Assessment /Associated Orders:      1. Acute pansinusitis, recurrence not specified  amoxicillin-clavulanate (AUGMENTIN) 875-125 MG Tab   2. Otalgia of left ear     3. PND (post-nasal drip)     4. Dysfunction of left eustachian tube         Medical Decision Making:    Pt is clinically stable at  today's acute urgent care visit.  No acute distress noted. Appropriate for outpatient management at this time.   Acute problem today with uncertain prognosis.   Educated in proper administration of medication(s) ordered today including safety, possible SE, risks, benefits, rationale and alternatives to therapy.   OTC antihistamine of choice. Follow manufactures dosing and safety guidelines.    Humidifier at night prn   OTC Saline irrigations or Neti pot rinse. Follow manufacturers recommendations.   Educated in proper administration of medication(s) ordered today including safety, possible SE, risks, benefits, rationale and alternatives to therapy.       Advised to follow-up with the primary care provider for recheck, reevaluation, and consideration of further management if necessary.   Discussed management options (risks,benefits, and alternatives to treatment). Expressed understanding and the treatment plan was agreed upon. Questions were encouraged and answered   Return to urgent care prn if new or worsening sx or if there is no improvement in condition prn.    Educated in Red flags and indications to immediately call 911 or present to the Emergency Department.     I personally reviewed prior external notes and test results pertinent to today's visit.  I have independently reviewed and interpreted all diagnostics ordered during this urgent care acute visit.   Time spent evaluating this patient was at least 30 minutes and includes preparing for visit, counseling/education, exam and evaluation, obtaining history, independent interpretation, ordering lab/test/procedures,medication management and documentation.Time does not include separately billable procedures noted .

## 2022-04-23 ENCOUNTER — APPOINTMENT (OUTPATIENT)
Dept: RADIOLOGY | Facility: MEDICAL CENTER | Age: 67
End: 2022-04-23
Attending: EMERGENCY MEDICINE
Payer: COMMERCIAL

## 2022-04-23 ENCOUNTER — OFFICE VISIT (OUTPATIENT)
Dept: URGENT CARE | Facility: CLINIC | Age: 67
End: 2022-04-23
Payer: COMMERCIAL

## 2022-04-23 ENCOUNTER — HOSPITAL ENCOUNTER (EMERGENCY)
Facility: MEDICAL CENTER | Age: 67
End: 2022-04-23
Attending: EMERGENCY MEDICINE
Payer: COMMERCIAL

## 2022-04-23 ENCOUNTER — APPOINTMENT (OUTPATIENT)
Dept: RADIOLOGY | Facility: IMAGING CENTER | Age: 67
End: 2022-04-23
Attending: NURSE PRACTITIONER
Payer: COMMERCIAL

## 2022-04-23 VITALS
DIASTOLIC BLOOD PRESSURE: 90 MMHG | RESPIRATION RATE: 16 BRPM | HEART RATE: 64 BPM | HEIGHT: 70 IN | TEMPERATURE: 98 F | OXYGEN SATURATION: 97 % | BODY MASS INDEX: 28.53 KG/M2 | WEIGHT: 199.3 LBS | SYSTOLIC BLOOD PRESSURE: 147 MMHG

## 2022-04-23 VITALS
OXYGEN SATURATION: 95 % | TEMPERATURE: 97.4 F | SYSTOLIC BLOOD PRESSURE: 116 MMHG | RESPIRATION RATE: 18 BRPM | DIASTOLIC BLOOD PRESSURE: 80 MMHG | BODY MASS INDEX: 28.86 KG/M2 | HEART RATE: 78 BPM | HEIGHT: 70 IN | WEIGHT: 201.6 LBS

## 2022-04-23 DIAGNOSIS — M79.89 LEG SWELLING: ICD-10-CM

## 2022-04-23 DIAGNOSIS — R05.9 COUGH: ICD-10-CM

## 2022-04-23 DIAGNOSIS — R53.83 OTHER FATIGUE: ICD-10-CM

## 2022-04-23 DIAGNOSIS — I45.10 RBBB: ICD-10-CM

## 2022-04-23 DIAGNOSIS — R53.83 FATIGUE, UNSPECIFIED TYPE: ICD-10-CM

## 2022-04-23 DIAGNOSIS — R60.9 PERIPHERAL EDEMA: ICD-10-CM

## 2022-04-23 PROBLEM — C77.9 SECONDARY MALIGNANT NEOPLASM OF LYMPH NODES (HCC): Status: ACTIVE | Noted: 2020-07-06

## 2022-04-23 PROBLEM — R79.89 ABNORMAL LIVER FUNCTION TESTS: Status: ACTIVE | Noted: 2021-12-22

## 2022-04-23 PROBLEM — C61 MALIGNANT NEOPLASM OF PROSTATE (HCC): Status: ACTIVE | Noted: 2018-03-16

## 2022-04-23 PROBLEM — N32.81 OVERACTIVE BLADDER: Status: ACTIVE | Noted: 2021-12-22

## 2022-04-23 PROBLEM — N32.0: Status: ACTIVE | Noted: 2018-03-16

## 2022-04-23 PROBLEM — N52.9 IMPOTENCE: Status: ACTIVE | Noted: 2019-01-07

## 2022-04-23 LAB
ALBUMIN SERPL BCP-MCNC: 4.3 G/DL (ref 3.2–4.9)
ALBUMIN/GLOB SERPL: 2 G/DL
ALP SERPL-CCNC: 72 U/L (ref 30–99)
ALT SERPL-CCNC: 28 U/L (ref 2–50)
ANION GAP SERPL CALC-SCNC: 10 MMOL/L (ref 7–16)
APPEARANCE UR: CLEAR
AST SERPL-CCNC: 26 U/L (ref 12–45)
BASOPHILS # BLD AUTO: 0.7 % (ref 0–1.8)
BASOPHILS # BLD: 0.05 K/UL (ref 0–0.12)
BILIRUB SERPL-MCNC: 0.3 MG/DL (ref 0.1–1.5)
BILIRUB UR QL STRIP.AUTO: NEGATIVE
BUN SERPL-MCNC: 18 MG/DL (ref 8–22)
CALCIUM SERPL-MCNC: 9.9 MG/DL (ref 8.5–10.5)
CHLORIDE SERPL-SCNC: 106 MMOL/L (ref 96–112)
CO2 SERPL-SCNC: 25 MMOL/L (ref 20–33)
COLOR UR: YELLOW
CREAT SERPL-MCNC: 1.09 MG/DL (ref 0.5–1.4)
EKG IMPRESSION: NORMAL
EOSINOPHIL # BLD AUTO: 0.59 K/UL (ref 0–0.51)
EOSINOPHIL NFR BLD: 8.3 % (ref 0–6.9)
ERYTHROCYTE [DISTWIDTH] IN BLOOD BY AUTOMATED COUNT: 38.5 FL (ref 35.9–50)
GFR SERPLBLD CREATININE-BSD FMLA CKD-EPI: 76 ML/MIN/1.73 M 2
GLOBULIN SER CALC-MCNC: 2.2 G/DL (ref 1.9–3.5)
GLUCOSE SERPL-MCNC: 91 MG/DL (ref 65–99)
GLUCOSE UR STRIP.AUTO-MCNC: NEGATIVE MG/DL
HCT VFR BLD AUTO: 38.4 % (ref 42–52)
HGB BLD-MCNC: 13.1 G/DL (ref 14–18)
IMM GRANULOCYTES # BLD AUTO: 0.03 K/UL (ref 0–0.11)
IMM GRANULOCYTES NFR BLD AUTO: 0.4 % (ref 0–0.9)
KETONES UR STRIP.AUTO-MCNC: NEGATIVE MG/DL
LEUKOCYTE ESTERASE UR QL STRIP.AUTO: NEGATIVE
LYMPHOCYTES # BLD AUTO: 1.23 K/UL (ref 1–4.8)
LYMPHOCYTES NFR BLD: 17.2 % (ref 22–41)
MCH RBC QN AUTO: 29.6 PG (ref 27–33)
MCHC RBC AUTO-ENTMCNC: 34.1 G/DL (ref 33.7–35.3)
MCV RBC AUTO: 86.7 FL (ref 81.4–97.8)
MICRO URNS: NORMAL
MONOCYTES # BLD AUTO: 0.69 K/UL (ref 0–0.85)
MONOCYTES NFR BLD AUTO: 9.7 % (ref 0–13.4)
NEUTROPHILS # BLD AUTO: 4.55 K/UL (ref 1.82–7.42)
NEUTROPHILS NFR BLD: 63.7 % (ref 44–72)
NITRITE UR QL STRIP.AUTO: NEGATIVE
NRBC # BLD AUTO: 0 K/UL
NRBC BLD-RTO: 0 /100 WBC
NT-PROBNP SERPL IA-MCNC: 141 PG/ML (ref 0–125)
PH UR STRIP.AUTO: 5.5 [PH] (ref 5–8)
PLATELET # BLD AUTO: 241 K/UL (ref 164–446)
PMV BLD AUTO: 8.7 FL (ref 9–12.9)
POTASSIUM SERPL-SCNC: 3.8 MMOL/L (ref 3.6–5.5)
PROT SERPL-MCNC: 6.5 G/DL (ref 6–8.2)
PROT UR QL STRIP: NEGATIVE MG/DL
RBC # BLD AUTO: 4.43 M/UL (ref 4.7–6.1)
RBC UR QL AUTO: NEGATIVE
SODIUM SERPL-SCNC: 141 MMOL/L (ref 135–145)
SP GR UR STRIP.AUTO: 1.01
TROPONIN T SERPL-MCNC: 8 NG/L (ref 6–19)
UROBILINOGEN UR STRIP.AUTO-MCNC: 0.2 MG/DL
WBC # BLD AUTO: 7.1 K/UL (ref 4.8–10.8)

## 2022-04-23 PROCEDURE — 83880 ASSAY OF NATRIURETIC PEPTIDE: CPT

## 2022-04-23 PROCEDURE — 36415 COLL VENOUS BLD VENIPUNCTURE: CPT

## 2022-04-23 PROCEDURE — 99215 OFFICE O/P EST HI 40 MIN: CPT | Performed by: NURSE PRACTITIONER

## 2022-04-23 PROCEDURE — 81003 URINALYSIS AUTO W/O SCOPE: CPT

## 2022-04-23 PROCEDURE — 85025 COMPLETE CBC W/AUTO DIFF WBC: CPT

## 2022-04-23 PROCEDURE — 700111 HCHG RX REV CODE 636 W/ 250 OVERRIDE (IP): Performed by: EMERGENCY MEDICINE

## 2022-04-23 PROCEDURE — 80053 COMPREHEN METABOLIC PANEL: CPT

## 2022-04-23 PROCEDURE — 99284 EMERGENCY DEPT VISIT MOD MDM: CPT

## 2022-04-23 PROCEDURE — 84484 ASSAY OF TROPONIN QUANT: CPT

## 2022-04-23 PROCEDURE — 93005 ELECTROCARDIOGRAM TRACING: CPT | Performed by: EMERGENCY MEDICINE

## 2022-04-23 PROCEDURE — 71046 X-RAY EXAM CHEST 2 VIEWS: CPT | Mod: TC,FY | Performed by: NURSE PRACTITIONER

## 2022-04-23 PROCEDURE — 93000 ELECTROCARDIOGRAM COMPLETE: CPT | Performed by: NURSE PRACTITIONER

## 2022-04-23 RX ORDER — PREDNISONE 10 MG/1
5 TABLET ORAL DAILY
Status: COMPLETED | OUTPATIENT
Start: 2022-04-23 | End: 2022-04-23

## 2022-04-23 RX ADMIN — PREDNISONE 5 MG: 10 TABLET ORAL at 14:36

## 2022-04-23 ASSESSMENT — ENCOUNTER SYMPTOMS
DIZZINESS: 0
SPUTUM PRODUCTION: 1
NAUSEA: 0
VOMITING: 0
CHILLS: 0
SHORTNESS OF BREATH: 0
WHEEZING: 1
SORE THROAT: 0
FEVER: 0
MYALGIAS: 0
LEG SWELLING: 1
ABDOMINAL PAIN: 0
NUMBNESS: 0
COUGH: 1
EYE PAIN: 0

## 2022-04-23 NOTE — ED PROVIDER NOTES
"ED Provider Note    CHIEF COMPLAINT  Chief Complaint   Patient presents with   • Leg Swelling   • Abdominal Swelling   • Shortness of Breath   • Fatigue       HPI  Brian Haynes is a 66 y.o. male who presents with a chief complaint of bilateral leg swelling, abdominal swelling, shortness of breath, and extreme fatigue that has been ongoing for the past 2 to 3 weeks since he discontinued his steroids and treatment for stage IV prostate cancer (on a \"drug holiday\" from both prednisone and ).  He was taking only 5 mg of prednisone twice daily and did not taper this down when he stopped because it was such a low dose.  He denies any chest pain but last night notes that he had some wheezing while lying on his back in bed and has been having a nonproductive cough as well.  He reports that he has all over body pain mostly centered in his joints and musculoskeletal region.  He has tried some over-the-counter pain medication which has been minimally helpful.  He googled his symptoms online and made an appointment in urgent care.  At urgent care he reportedly had an abnormal EKG and was sent to the ER for evaluation.  He denies any fevers or chills, hemoptysis, abdominal pain, nausea or vomiting.    REVIEW OF SYSTEMS  See HPI for further details.  Bilateral leg swelling.  Abdominal swelling.  Shortness of breath.  Extreme fatigue.  Joint pain.  All other systems are negative.     PAST MEDICAL HISTORY   has a past medical history of Anesthesia, Arthritis, Cancer (HCC) (10/2013), Dermatitis, Disorder of thyroid, Hayfever, Heart burn, High cholesterol, Hypertension, Indigestion, Migraines, and Right knee pain (12/05/2019).    SOCIAL HISTORY  Social History     Tobacco Use   • Smoking status: Never Smoker   • Smokeless tobacco: Never Used   Vaping Use   • Vaping Use: Never used   Substance and Sexual Activity   • Alcohol use: Yes     Comment: 2 per month   • Drug use: Never   • Sexual activity: Yes     Partners: Female " "      SURGICAL HISTORY   has a past surgical history that includes prostatectomy, radial (10/2013); inguinal hernia repair (Left, 4/13/2017); bladder neck contracture exicision (07/2018); hernia repair, incisional, unilateral (Left, 11/2014); tonsillectomy (1959); hunter by laparoscopy (04/2010); dental extraction(s) (1971); colonoscopy (2014); knee arthroscopy (Right, 9/13/2018); meniscectomy, knee, medial (Right, 9/13/2018); septoplasty (1997); and plasty knee,med or lat compartmt (Right, 12/9/2019).    CURRENT MEDICATIONS  Home Medications    **Home medications have not yet been reviewed for this encounter**         ALLERGIES  No Known Allergies    PHYSICAL EXAM  VITAL SIGNS: /91   Pulse 86   Temp 35.9 °C (96.6 °F) (Temporal)   Resp 18   Ht 1.778 m (5' 10\")   Wt 90.4 kg (199 lb 4.7 oz)   SpO2 96%   BMI 28.60 kg/m²    Pulse ox interpretation: I interpret this pulse ox as normal.  Constitutional: Alert in no apparent distress.  HENT: No signs of trauma, Bilateral external ears normal, Nose normal.  Moist mucous membranes.  Eyes: Pupils are equal and reactive, Conjunctiva normal, Non-icteric.   Neck: Normal range of motion, No tenderness, Supple, No stridor.   Lymphatic: No lymphadenopathy noted.   Cardiovascular: Regular rate and rhythm, no murmurs. Pulses symmetrical.  Thorax & Lungs: Normal breath sounds, No respiratory distress, No wheezing, No chest tenderness.   Abdomen: Bowel sounds normal, Soft, No tenderness, No masses, No pulsatile masses. No peritoneal signs.  Skin: Warm, Dry, No erythema, No rash.   Back: Normal alignment.  Extremities: Intact distal pulses, bilateral lower extremity nonpitting edema, No tenderness, No cyanosis.  Musculoskeletal: No major deformities noted.   Neurologic: Alert, No focal deficits noted.   Psychiatric: Affect normal, Judgment normal, Mood normal.     DIAGNOSTIC STUDIES / PROCEDURES    LABS  Results for orders placed or performed during the hospital encounter of " 04/23/22   CBC with Differential   Result Value Ref Range    WBC 7.1 4.8 - 10.8 K/uL    RBC 4.43 (L) 4.70 - 6.10 M/uL    Hemoglobin 13.1 (L) 14.0 - 18.0 g/dL    Hematocrit 38.4 (L) 42.0 - 52.0 %    MCV 86.7 81.4 - 97.8 fL    MCH 29.6 27.0 - 33.0 pg    MCHC 34.1 33.7 - 35.3 g/dL    RDW 38.5 35.9 - 50.0 fL    Platelet Count 241 164 - 446 K/uL    MPV 8.7 (L) 9.0 - 12.9 fL    Neutrophils-Polys 63.70 44.00 - 72.00 %    Lymphocytes 17.20 (L) 22.00 - 41.00 %    Monocytes 9.70 0.00 - 13.40 %    Eosinophils 8.30 (H) 0.00 - 6.90 %    Basophils 0.70 0.00 - 1.80 %    Immature Granulocytes 0.40 0.00 - 0.90 %    Nucleated RBC 0.00 /100 WBC    Neutrophils (Absolute) 4.55 1.82 - 7.42 K/uL    Lymphs (Absolute) 1.23 1.00 - 4.80 K/uL    Monos (Absolute) 0.69 0.00 - 0.85 K/uL    Eos (Absolute) 0.59 (H) 0.00 - 0.51 K/uL    Baso (Absolute) 0.05 0.00 - 0.12 K/uL    Immature Granulocytes (abs) 0.03 0.00 - 0.11 K/uL    NRBC (Absolute) 0.00 K/uL   Comp Metabolic Panel   Result Value Ref Range    Sodium 141 135 - 145 mmol/L    Potassium 3.8 3.6 - 5.5 mmol/L    Chloride 106 96 - 112 mmol/L    Co2 25 20 - 33 mmol/L    Anion Gap 10.0 7.0 - 16.0    Glucose 91 65 - 99 mg/dL    Bun 18 8 - 22 mg/dL    Creatinine 1.09 0.50 - 1.40 mg/dL    Calcium 9.9 8.5 - 10.5 mg/dL    AST(SGOT) 26 12 - 45 U/L    ALT(SGPT) 28 2 - 50 U/L    Alkaline Phosphatase 72 30 - 99 U/L    Total Bilirubin 0.3 0.1 - 1.5 mg/dL    Albumin 4.3 3.2 - 4.9 g/dL    Total Protein 6.5 6.0 - 8.2 g/dL    Globulin 2.2 1.9 - 3.5 g/dL    A-G Ratio 2.0 g/dL   TROPONIN   Result Value Ref Range    Troponin T 8 6 - 19 ng/L   proBrain Natriuretic Peptide, NT   Result Value Ref Range    NT-proBNP 141 (H) 0 - 125 pg/mL   URINALYSIS    Specimen: Urine, Clean Catch   Result Value Ref Range    Color Yellow     Character Clear     Specific Gravity 1.006 <1.035    Ph 5.5 5.0 - 8.0    Glucose Negative Negative mg/dL    Ketones Negative Negative mg/dL    Protein Negative Negative mg/dL    Bilirubin  Negative Negative    Urobilinogen, Urine 0.2 Negative    Nitrite Negative Negative    Leukocyte Esterase Negative Negative    Occult Blood Negative Negative    Micro Urine Req see below    ESTIMATED GFR   Result Value Ref Range    GFR (CKD-EPI) 76 >60 mL/min/1.73 m 2   EKG (NOW)   Result Value Ref Range    Report       [facility] Emergency Dept.    Test Date:  2022  Pt Name:    JOSEY JURADO              Department: ER  MRN:        [id number]                      Room:  Gender:     Male                         Technician: [id number]  :        [id number]                   Requested By:ER TRIAGE PROTOCOL  Order #:    [id number]                    Reading MD: Frank Bolaños MD    Measurements  Intervals                                Axis  Rate:       69                           P:          51  DE:         156                          QRS:        32  QRSD:       146                          T:          8  QT:         440  QTc:        472    Interpretive Statements  SINUS RHYTHM  RIGHT BUNDLE BRANCH BLOCK  Compared to ECG 2019 09:19:40  Right bundle-branch block now present  Left posterior fascicular block no longer present  Electronically Signed On 2022 12:48:09 PDT by Frank Bolaños MD       RADIOLOGY  No orders to display       COURSE & MEDICAL DECISION MAKING  Pertinent Labs & Imaging studies reviewed. (See chart for details)  Records obtained and reviewed: Patient was seen in urgent care earlier today where an EKG demonstrated a right bundle branch block.  He was sent to the ER because of the abnormality on his EKG for higher level of care.    This is a nika 66-year-old male who unfortunately has metastatic prostate cancer and was sent from urgent care after an EKG demonstrated a new right bundle branch block.  Patient has some nonpitting bilateral lower extremity swelling as well as some fullness in his abdomen without tenderness.  He does not have shortness of breath per se but did  have some wheezing and cough last night.  He has no chest pain and his EKG here does demonstrate a right bundle branch block which in and of itself is not concerning.  His lower extremities do appear to have some very mild edema that is nonpitting.  It is bilateral.  He is not hypoxic and his heart rate is in the 60s.  Low suspicion for PE and without chest pain I will not pursue a work-up for this.    Differential diagnosis includes, but is not limited to, anemia, electrolyte abnormality, hypoglycemia, fluid overload, infection.  Less likely ACS.  Initial troponin is 8 and BNP is 141.  Chest x-ray from earlier today was reviewed and does not demonstrate acute abnormality including fluid overload.  No pneumonia.    Patient has no leukocytosis.  He does have a very mild anemia with hemoglobin of 13.1 which I think is unlikely to be the source of the patient's symptoms.  His metabolic panel is grossly normal.  Urinalysis does not suggest infection or dehydration.    I discussed the patient with the on-call urologist, Dr. Jade.  He does feel that the patient's symptoms are likely secondary to discontinuing the steroids without taper.  He has recommended that we restart his prednisone at 5 mg twice daily for the next 5 days and then 5 mg daily until he follows up with Dr. Cota as an outpatient.  He will be given a dose of prednisone here today.    Patient was reevaluated at bedside.  He is resting comfortably.  His vital signs remain normal and stable.  I think this is unlikely to represent a cardiac etiology.  He is not hypoxic or tachycardic.  Although he is complaining of some leg swelling it is not unilateral, he has no chest pain or hemoptysis, and I have a low suspicion that this represents PE.  He has prednisone at home that he will restart.  He will follow-up with his primary care physician and urologist this week for recheck.    Patient discharged in good and stable condition with very strict return  precautions.    The patient will return for worsening symptoms and is stable at the time of discharge. The patient verbalizes understanding and will comply.    FINAL IMPRESSION  1. Other fatigue     2. Peripheral edema  REFERRAL TO CARDIOLOGY       Electronically signed by: Frank Bolaños M.D., 4/23/2022 12:25 PM

## 2022-04-23 NOTE — ED TRIAGE NOTES
Chief Complaint   Patient presents with   • Leg Swelling   • Abdominal Swelling   • Shortness of Breath   • Fatigue     Pt ambulated to triage sent from urgent care with above complaints x1wk. Patient has history of prostate cancer.

## 2022-04-23 NOTE — ED NOTES
Pt medicated per orders.     Discharge teaching and paperwork provided regarding and all questions/concerns answered. VSS, assessment stable and PIV removed. Patient discharged to the care of self/spouse and ambulated out of the ED.

## 2022-04-23 NOTE — PROGRESS NOTES
"Subjective:   Brian Haynes is a 66 y.o. male who presents for Leg Swelling (1x week, Feet/ leg swelling, abdomin, wheezing last night )      Leg Swelling  This is a new problem. The current episode started in the past 7 days (recently came off chemo medication for a\" drug holiday' symptoms have been worse since.). The problem occurs constantly. The problem has been unchanged. Associated symptoms include coughing. Pertinent negatives include no abdominal pain, chest pain, chills, fever, myalgias, nausea, numbness, rash, sore throat or vomiting. Nothing aggravates the symptoms. He has tried nothing for the symptoms. The treatment provided no relief.       Review of Systems   Constitutional: Positive for malaise/fatigue. Negative for chills and fever.   HENT: Negative for sore throat.    Eyes: Negative for pain.   Respiratory: Positive for cough, sputum production and wheezing. Negative for shortness of breath.    Cardiovascular: Negative for chest pain.   Gastrointestinal: Negative for abdominal pain, nausea and vomiting.   Genitourinary: Negative for hematuria.   Musculoskeletal: Negative for joint pain and myalgias.        Leg swelling      Skin: Negative for rash.   Neurological: Negative for dizziness and numbness.     PMH:  has a past medical history of Anesthesia, Arthritis, Cancer (HCC) (10/2013), Dermatitis, Disorder of thyroid, Hayfever, Heart burn, High cholesterol, Hypertension, Indigestion, Migraines, and Right knee pain (12/05/2019).  MEDS:   Current Outpatient Medications:   •  Abiraterone Acetate 250 MG Tab, abiraterone 250 mg tablet, Disp: , Rfl:   •  Relugolix (ORGOVYX) 120 MG Tab, Orgovyx 120 mg tablet, Disp: , Rfl:   •  predniSONE (DELTASONE) 5 MG Tab, prednisone 5 mg tablet, Disp: , Rfl:   •  sumatriptan (IMITREX) 100 MG tablet, Take 100 mg by mouth as needed., Disp: , Rfl:   •  famotidine (PEPCID) 20 MG Tab, Take 20 mg by mouth every bedtime., Disp: , Rfl:   •  hydrochlorothiazide " "(MICROZIDE) 12.5 MG capsule, TAKE 1 CAP BY MOUTH EVERY DAY., Disp: 30 Cap, Rfl: 0  •  enalapril (VASOTEC) 10 MG TABS, TAKE 1 TAB BY MOUTH 2 TIMES A DAY., Disp: 30 Each, Rfl: 0  •  triazolam (HALCION) 0.25 MG Tab, triazolam 0.25 mg tablet, Disp: , Rfl:   ALLERGIES: No Known Allergies  SURGHX:   Past Surgical History:   Procedure Laterality Date   • PB PLASTY KNEE,MED OR LAT COMPARTMT Right 12/9/2019    Procedure: ARTHROPLASTY, KNEE, UNICOMPARTMENTAL - VERSUS;  Surgeon: Olman Baez M.D.;  Location: [facility];  Service: Orthopedics   • KNEE ARTHROSCOPY Right 9/13/2018    Procedure: KNEE ARTHROSCOPY;  Surgeon: Olman Baez M.D.;  Location: [facility];  Service: Orthopedics   • MENISCECTOMY, KNEE, MEDIAL Right 9/13/2018    Procedure: MEDIAL MENISCECTOMY - PARTIAL, KEITH;  Surgeon: Olman Baez M.D.;  Location: [facility];  Service: Orthopedics   • BLADDER NECK CONTRACTURE EXICISION  07/2018   • INGUINAL HERNIA REPAIR Left 4/13/2017    Procedure: INGUINAL HERNIA REPAIR- OPEN;  Surgeon: Selvin Peña M.D.;  Location: [facility];  Service:    • HERNIA REPAIR, INCISIONAL, UNILATERAL Left 11/2014    and by hunter scar   • COLONOSCOPY  2014   • PROSTATECTOMY, RADIAL  10/2013    robotic for cancer   • HUNTER BY LAPAROSCOPY  04/2010   • SEPTOPLASTY  1997   • DENTAL EXTRACTION(S)  1971   • TONSILLECTOMY  1959     SOCHX:  reports that he has never smoked. He has never used smokeless tobacco. He reports current alcohol use. He reports that he does not use drugs.  FH: Family history was reviewed, no pertinent findings to report       Objective:     /80 (BP Location: Left arm, Patient Position: Sitting, BP Cuff Size: Adult)   Pulse 78   Temp 36.3 °C (97.4 °F) (Temporal)   Resp 18   Ht 1.778 m (5' 10\")   Wt 91.4 kg (201 lb 9.6 oz)   SpO2 95%   BMI 28.93 kg/m²     Physical Exam  Vitals and nursing note reviewed.   Constitutional:       General: " He is not in acute distress.     Appearance: He is well-developed.   HENT:      Head: Normocephalic and atraumatic.      Right Ear: External ear normal.      Left Ear: External ear normal.      Nose: Nose normal.      Mouth/Throat:      Mouth: Mucous membranes are moist.   Eyes:      Conjunctiva/sclera: Conjunctivae normal.   Cardiovascular:      Rate and Rhythm: Normal rate.   Pulmonary:      Effort: Pulmonary effort is normal. No respiratory distress.      Breath sounds: Normal breath sounds.   Abdominal:      General: There is no distension.   Musculoskeletal:         General: Normal range of motion.      Right lower leg: Swelling present. No lacerations, tenderness or bony tenderness. Edema present.      Left lower leg: Swelling present. No lacerations, tenderness or bony tenderness. Edema present.   Skin:     General: Skin is warm and dry.   Neurological:      General: No focal deficit present.      Mental Status: He is alert and oriented to person, place, and time. Mental status is at baseline.      Gait: Gait (gait at baseline) normal.   Psychiatric:         Judgment: Judgment normal.         Assessment/Plan:     Diagnosis and associated orders:     1. Cough  DX-CHEST-2 VIEWS   2. Fatigue, unspecified type     3. Leg swelling  EKG - Clinic Performed   4. RBBB          Comments/MDM:       1. No active cardiopulmonary abnormalities are identified.      EKG: NSR rate:   69 RBBB    At this time, I feel the patient requires a higher level of care including closer monitoring, stat lab work and/or imaging for further evaluation for complaints of fattigue, swelling, new onset RBBB. This has been discussed with the patient and they state agreement and understanding.  I offered the patient an ambulance ride and  the patient is declining at this time. The patient is in no acute distress upon clinic departure and will go directly to ED without delay.   .             Please note that this dictation was created using voice  recognition software. I have made a reasonable attempt to correct obvious errors, but I expect that there are errors of grammar and possibly content that I did not discover before finalizing the note.    This note was electronically signed by Obed HANKINS.

## 2022-04-23 NOTE — DISCHARGE INSTRUCTIONS
You were seen in the ER for generalized fatigue as well as swelling in the legs and abdomen.  Thankfully, your labs are reassuring.  I did speak with the doctor on-call for Dr. Cota who has recommended that we restart your prednisone.  He would like you to restart with 5 mg twice daily as you are taking prior to your drug holiday and then after 5 days reduce it to 5 mg daily.  Please contact Dr. Cota to make a follow-up appointment.  Please follow-up with your primary care physician this week for recheck.  I have placed a referral to cardiology so that you can be evaluated as an outpatient.  Return immediately with any new or worsening symptoms.  Good luck, I hope you feel better soon!

## 2022-06-15 ENCOUNTER — OFFICE VISIT (OUTPATIENT)
Dept: CARDIOLOGY | Facility: MEDICAL CENTER | Age: 67
End: 2022-06-15
Attending: EMERGENCY MEDICINE
Payer: COMMERCIAL

## 2022-06-15 VITALS
RESPIRATION RATE: 16 BRPM | BODY MASS INDEX: 28.77 KG/M2 | DIASTOLIC BLOOD PRESSURE: 68 MMHG | WEIGHT: 201 LBS | HEIGHT: 70 IN | HEART RATE: 71 BPM | SYSTOLIC BLOOD PRESSURE: 106 MMHG | OXYGEN SATURATION: 95 %

## 2022-06-15 DIAGNOSIS — I45.10 RBBB: ICD-10-CM

## 2022-06-15 DIAGNOSIS — R60.9 EDEMA, UNSPECIFIED TYPE: ICD-10-CM

## 2022-06-15 DIAGNOSIS — I10 HYPERTENSION, UNSPECIFIED TYPE: ICD-10-CM

## 2022-06-15 DIAGNOSIS — E78.5 HYPERLIPIDEMIA, UNSPECIFIED HYPERLIPIDEMIA TYPE: Chronic | ICD-10-CM

## 2022-06-15 LAB — EKG IMPRESSION: NORMAL

## 2022-06-15 PROCEDURE — 99204 OFFICE O/P NEW MOD 45 MIN: CPT | Mod: 25 | Performed by: INTERNAL MEDICINE

## 2022-06-15 PROCEDURE — 93000 ELECTROCARDIOGRAM COMPLETE: CPT | Performed by: INTERNAL MEDICINE

## 2022-06-15 ASSESSMENT — ENCOUNTER SYMPTOMS
SYNCOPE: 0
CONSTIPATION: 0
DIZZINESS: 0
PALPITATIONS: 0
COUGH: 0
ABDOMINAL PAIN: 0
HEARTBURN: 0
DIARRHEA: 0
WEIGHT LOSS: 0
PND: 0
DYSPNEA ON EXERTION: 0
NAUSEA: 0
FLANK PAIN: 0
ORTHOPNEA: 0
BLURRED VISION: 0
IRREGULAR HEARTBEAT: 0
DEPRESSION: 0
SHORTNESS OF BREATH: 0
FEVER: 0
NEAR-SYNCOPE: 0
BACK PAIN: 0
ALTERED MENTAL STATUS: 0
WEIGHT GAIN: 0
CLAUDICATION: 0
DECREASED APPETITE: 0
VOMITING: 0

## 2022-06-15 ASSESSMENT — FIBROSIS 4 INDEX: FIB4 SCORE: 1.3

## 2022-06-15 NOTE — PROGRESS NOTES
Cardiology Note    Chief Complaint   Patient presents with   • Hypertension   • Hyperlipidemia       History of Present Illness: Brian Haynes is a 66 y.o. male PMH HTN, prostate CA who presents for initial visit.     Describes visit to ED 4/2022 for leg swelling. At the time hypertensive above baseline. He explains is on a drug holiday for prostate cancer treatment. In addition to this he was on vacation from prednisone. Swelling resolved after resuming prednisone. Previously on lupron which was stopped 11/2021. No other cardiac symptoms. Walks regularly. No relevant family history cardiovascular disease other than hypertension. Denies toxic social habits.    Review of Systems   Constitutional: Negative for decreased appetite, fever, malaise/fatigue, weight gain and weight loss.   HENT: Negative for congestion and nosebleeds.    Eyes: Negative for blurred vision.   Cardiovascular: Negative for chest pain, claudication, dyspnea on exertion, irregular heartbeat, leg swelling, near-syncope, orthopnea, palpitations, paroxysmal nocturnal dyspnea and syncope.   Respiratory: Negative for cough and shortness of breath.    Endocrine: Negative for cold intolerance and heat intolerance.   Skin: Negative for rash.   Musculoskeletal: Negative for back pain.   Gastrointestinal: Negative for abdominal pain, constipation, diarrhea, heartburn, melena, nausea and vomiting.   Genitourinary: Negative for dysuria, flank pain and hematuria.   Neurological: Negative for dizziness.   Psychiatric/Behavioral: Negative for altered mental status and depression.         Past Medical History:   Diagnosis Date   • Anesthesia     Pt reports small airway and difficulty intubating.   • Arthritis     Osteo to knee and neck and fingers   • Cancer (HCC) 10/2013    Prostate-prostatectomy and radiation x2(recently cyber knife 8/2018)   • Dermatitis    • Disorder of thyroid     Hashimotos (last blood test said no)-no current meds   • Hayfever    •  Heart burn     Treated with pepcid.   • High cholesterol    • Hypertension    • Indigestion     Treated with pepcid.   • Migraines     3-4 per year   • Right knee pain 12/05/2019         Past Surgical History:   Procedure Laterality Date   • PB PLASTY KNEE,MED OR LAT COMPARTMT Right 12/9/2019    Procedure: ARTHROPLASTY, KNEE, UNICOMPARTMENTAL - VERSUS;  Surgeon: Olman Baez M.D.;  Location: SURGERY [facility];  Service: Orthopedics   • KNEE ARTHROSCOPY Right 9/13/2018    Procedure: KNEE ARTHROSCOPY;  Surgeon: Olman Baez M.D.;  Location: SURGERY [facility];  Service: Orthopedics   • MENISCECTOMY, KNEE, MEDIAL Right 9/13/2018    Procedure: MEDIAL MENISCECTOMY - PARTIAL, KEITH;  Surgeon: Olman Baez M.D.;  Location: [facility];  Service: Orthopedics   • BLADDER NECK CONTRACTURE EXICISION  07/2018   • INGUINAL HERNIA REPAIR Left 4/13/2017    Procedure: INGUINAL HERNIA REPAIR- OPEN;  Surgeon: Selvin Peña M.D.;  Location: [facility];  Service:    • HERNIA REPAIR, INCISIONAL, UNILATERAL Left 11/2014    and by hunter scar   • COLONOSCOPY  2014   • PROSTATECTOMY, RADIAL  10/2013    robotic for cancer   • HUNTER BY LAPAROSCOPY  04/2010   • SEPTOPLASTY  1997   • DENTAL EXTRACTION(S)  1971   • TONSILLECTOMY  1959         Current Outpatient Medications   Medication Sig Dispense Refill   • Abiraterone Acetate 250 MG Tab abiraterone 250 mg tablet     • Relugolix (ORGOVYX) 120 MG Tab Take 120 mg by mouth every day.     • predniSONE (DELTASONE) 5 MG Tab Take 5 mg by mouth every day.     • sumatriptan (IMITREX) 100 MG tablet Take 100 mg by mouth as needed.     • famotidine (PEPCID) 20 MG Tab Take 20 mg by mouth every bedtime.     • hydrochlorothiazide (MICROZIDE) 12.5 MG capsule TAKE 1 CAP BY MOUTH EVERY DAY. 30 Cap 0   • enalapril (VASOTEC) 10 MG TABS TAKE 1 TAB BY MOUTH 2 TIMES A DAY. 30 Each 0     No current facility-administered medications for this visit.  "        No Known Allergies      Family History   Problem Relation Age of Onset   • Hypertension Mother    • Lung Disease Father    • Cancer Father    • Prostate cancer Father    • Thyroid Father    • Hypertension Father    • Allergies Father    • Arthritis Father          Social History     Socioeconomic History   • Marital status:      Spouse name: Not on file   • Number of children: Not on file   • Years of education: Not on file   • Highest education level: Not on file   Occupational History   • Not on file   Tobacco Use   • Smoking status: Never Smoker   • Smokeless tobacco: Never Used   Vaping Use   • Vaping Use: Never used   Substance and Sexual Activity   • Alcohol use: Yes     Comment: 2 per month   • Drug use: Never   • Sexual activity: Yes     Partners: Female   Other Topics Concern   • Not on file   Social History Narrative   • Not on file     Social Determinants of Health     Financial Resource Strain: Not on file   Food Insecurity: Not on file   Transportation Needs: Not on file   Physical Activity: Not on file   Stress: Not on file   Social Connections: Not on file   Intimate Partner Violence: Not on file   Housing Stability: Not on file         Physical Exam:  Ambulatory Vitals  /68 (BP Location: Left arm, Patient Position: Sitting, BP Cuff Size: Adult)   Pulse 71   Resp 16   Ht 1.778 m (5' 10\")   Wt 91.2 kg (201 lb)   SpO2 95%    BP Readings from Last 4 Encounters:   06/15/22 106/68   04/23/22 147/90   04/23/22 116/80   04/14/22 120/62     Weight/BMI:   Vitals:    06/15/22 1436   BP: 106/68   Weight: 91.2 kg (201 lb)   Height: 1.778 m (5' 10\")    Body mass index is 28.84 kg/m².  Wt Readings from Last 4 Encounters:   06/15/22 91.2 kg (201 lb)   04/23/22 90.4 kg (199 lb 4.7 oz)   04/23/22 91.4 kg (201 lb 9.6 oz)   04/14/22 90 kg (198 lb 6.4 oz)       Physical Exam  Constitutional:       General: He is not in acute distress.  HENT:      Head: Normocephalic and atraumatic.   Eyes:      " Conjunctiva/sclera: Conjunctivae normal.      Pupils: Pupils are equal, round, and reactive to light.   Neck:      Vascular: No JVD.   Cardiovascular:      Rate and Rhythm: Normal rate and regular rhythm.      Heart sounds: Normal heart sounds. No murmur heard.    No friction rub. No gallop.   Pulmonary:      Effort: Pulmonary effort is normal. No respiratory distress.      Breath sounds: Normal breath sounds. No wheezing or rales.   Chest:      Chest wall: No tenderness.   Abdominal:      General: Bowel sounds are normal. There is no distension.      Palpations: Abdomen is soft.   Musculoskeletal:      Cervical back: Normal range of motion and neck supple.   Skin:     General: Skin is warm and dry.   Neurological:      Mental Status: He is alert and oriented to person, place, and time.   Psychiatric:         Mood and Affect: Affect normal.         Judgment: Judgment normal.         Lab Data Review:  Lab Results   Component Value Date/Time    CHOLSTRLTOT 160 04/09/2007 05:20 AM     (H) 04/09/2007 05:20 AM    HDL 48 04/09/2007 05:20 AM    TRIGLYCERIDE 51 04/09/2007 05:20 AM       Lab Results   Component Value Date/Time    SODIUM 141 04/23/2022 12:51 PM    POTASSIUM 3.8 04/23/2022 12:51 PM    CHLORIDE 106 04/23/2022 12:51 PM    CO2 25 04/23/2022 12:51 PM    GLUCOSE 91 04/23/2022 12:51 PM    BUN 18 04/23/2022 12:51 PM    CREATININE 1.09 04/23/2022 12:51 PM    CREATININE 1.0 04/13/2007 05:39 AM     CrCl cannot be calculated (Patient's most recent lab result is older than the maximum 7 days allowed.).  Lab Results   Component Value Date/Time    ALKPHOSPHAT 72 04/23/2022 12:51 PM    ASTSGOT 26 04/23/2022 12:51 PM    ALTSGPT 28 04/23/2022 12:51 PM    TBILIRUBIN 0.3 04/23/2022 12:51 PM      Lab Results   Component Value Date/Time    WBC 7.1 04/23/2022 12:51 PM     No results found for: HBA1C  No components found for: TROP      Cardiac Imaging and Procedures Review:      EKG 6/15/22 interpreted by me sinus 65,  incomplete rbbb  EKG 4/23/22 sinus, RBBB, nonspecific t wave changes    Medical Decision Making:  Problem List Items Addressed This Visit     Hypertension (Chronic)    Relevant Orders    EKG (Completed)    Lipid Profile    Hyperlipidemia (Chronic)    RBBB    Relevant Orders    EC-ECHOCARDIOGRAM COMPLETE W/O CONT    NM-CARDIAC STRESS TEST    Lipid Profile    Edema        Check echocardiogram and stress mpi spect for cardiovascular contribution to edema and intermittent rbbb. Check lipids.     It was my pleasure to meet with Mr. Haynes.

## 2022-07-20 ENCOUNTER — OFFICE VISIT (OUTPATIENT)
Dept: URGENT CARE | Facility: CLINIC | Age: 67
End: 2022-07-20
Payer: COMMERCIAL

## 2022-07-20 VITALS
WEIGHT: 199.2 LBS | SYSTOLIC BLOOD PRESSURE: 120 MMHG | HEIGHT: 70 IN | DIASTOLIC BLOOD PRESSURE: 78 MMHG | OXYGEN SATURATION: 96 % | BODY MASS INDEX: 28.52 KG/M2 | RESPIRATION RATE: 16 BRPM | HEART RATE: 84 BPM | TEMPERATURE: 97.2 F

## 2022-07-20 DIAGNOSIS — J01.00 ACUTE NON-RECURRENT MAXILLARY SINUSITIS: ICD-10-CM

## 2022-07-20 PROCEDURE — 99213 OFFICE O/P EST LOW 20 MIN: CPT | Performed by: PHYSICIAN ASSISTANT

## 2022-07-20 RX ORDER — AMOXICILLIN AND CLAVULANATE POTASSIUM 875; 125 MG/1; MG/1
1 TABLET, FILM COATED ORAL 2 TIMES DAILY
Qty: 14 TABLET | Refills: 0 | Status: SHIPPED | OUTPATIENT
Start: 2022-07-20 | End: 2022-07-27

## 2022-07-20 ASSESSMENT — ENCOUNTER SYMPTOMS
WHEEZING: 0
DIZZINESS: 0
VOMITING: 0
SPUTUM PRODUCTION: 0
DIAPHORESIS: 0
SHORTNESS OF BREATH: 0
HEADACHES: 0
ABDOMINAL PAIN: 0
DIARRHEA: 0
NAUSEA: 0
CHILLS: 0
FEVER: 0
SORE THROAT: 1
PALPITATIONS: 0
SINUS PAIN: 1
COUGH: 0
MYALGIAS: 0

## 2022-07-20 ASSESSMENT — FIBROSIS 4 INDEX: FIB4 SCORE: 1.3

## 2022-07-20 NOTE — PROGRESS NOTES
"Subjective:     CHIEF COMPLAINT  Chief Complaint   Patient presents with   • Nasal Congestion     2 1/2 weeks ago, \"Sore throat, bright yellow discharge, painful sinuses\"        HPI  Brian Haynes is a very pleasant 66 y.o. male who presents to the clinic with sinus pain and pressure x2.5 weeks.  States symptoms feel similar to previous sinus infections.  He has been taking Mucinex D and using nasal saline rinses without improvement.  Mucus is bright yellow in color.  Occasionally has a sore throat due to postnasal drainage.  He has not been running a fever.  No cough, shortness of breath or chest pain.  No body aches or chills.  No known ill contacts.    REVIEW OF SYSTEMS  Review of Systems   Constitutional: Negative for chills, diaphoresis, fever and malaise/fatigue.   HENT: Positive for congestion, sinus pain and sore throat. Negative for ear pain.    Respiratory: Negative for cough, sputum production, shortness of breath and wheezing.    Cardiovascular: Negative for chest pain and palpitations.   Gastrointestinal: Negative for abdominal pain, diarrhea, nausea and vomiting.   Musculoskeletal: Negative for myalgias.   Neurological: Negative for dizziness and headaches.   Endo/Heme/Allergies: Positive for environmental allergies.       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   • Localized osteoarthritis of right knee 12/10/2019   • Impotence 01/07/2019   • Acute medial meniscus tear, right, initial encounter 09/13/2018   • Acquired stenosis of bladder neck 03/16/2018   • Malignant neoplasm of prostate (HCC) 03/16/2018   • Incarcerated hernia 04/13/2017   • Hypertension 12/14/2011   • Hyperlipidemia 12/14/2011       SURGICAL HISTORY   has a past surgical history that includes prostatectomy, " "radial (10/2013); inguinal hernia repair (Left, 4/13/2017); bladder neck contracture exicision (07/2018); hernia repair, incisional, unilateral (Left, 11/2014); tonsillectomy (1959); hunter by laparoscopy (04/2010); dental extraction(s) (1971); colonoscopy (2014); knee arthroscopy (Right, 9/13/2018); meniscectomy, knee, medial (Right, 9/13/2018); septoplasty (1997); and plasty knee,med or lat compartmt (Right, 12/9/2019).    ALLERGIES  No Known Allergies    CURRENT MEDICATIONS  Home Medications     Reviewed by Sky Daniel P.A.-C. (Physician Assistant) on 07/20/22 at 1617  Med List Status: <None>   Medication Last Dose Status   Abiraterone Acetate 250 MG Tab PRN Active   enalapril (VASOTEC) 10 MG TABS Taking Active   famotidine (PEPCID) 20 MG Tab Taking Active   hydrochlorothiazide (MICROZIDE) 12.5 MG capsule Taking Active   predniSONE (DELTASONE) 5 MG Tab PRN Active   Relugolix (ORGOVYX) 120 MG Tab PRN Active   sumatriptan (IMITREX) 100 MG tablet PRN Active   Ubrogepant (UBRELVY PO) Taking Active                SOCIAL HISTORY  Social History     Tobacco Use   • Smoking status: Never Smoker   • Smokeless tobacco: Never Used   Vaping Use   • Vaping Use: Never used   Substance and Sexual Activity   • Alcohol use: Yes     Comment: 2 per month   • Drug use: Never   • Sexual activity: Yes     Partners: Female       FAMILY HISTORY  Family History   Problem Relation Age of Onset   • Hypertension Mother    • Lung Disease Father    • Cancer Father    • Prostate cancer Father    • Thyroid Father    • Hypertension Father    • Allergies Father    • Arthritis Father           Objective:     VITAL SIGNS: /78 (BP Location: Left arm, Patient Position: Sitting, BP Cuff Size: Adult)   Pulse 84   Temp 36.2 °C (97.2 °F) (Temporal)   Resp 16   Ht 1.778 m (5' 10\")   Wt 90.4 kg (199 lb 3.2 oz)   SpO2 96%   BMI 28.58 kg/m²     PHYSICAL EXAM  Physical Exam  Constitutional:       General: He is not in acute distress.     " Appearance: Normal appearance. He is not ill-appearing, toxic-appearing or diaphoretic.   HENT:      Head: Normocephalic and atraumatic.      Right Ear: Ear canal and external ear normal.      Left Ear: Ear canal and external ear normal.      Ears:      Comments: Bilateral TMs bulging with small amounts of purulence.  Minimally erythematous.     Nose: Congestion present. No rhinorrhea.      Mouth/Throat:      Mouth: Mucous membranes are moist.      Pharynx: No oropharyngeal exudate or posterior oropharyngeal erythema.      Comments: Purulent postnasal drip  Eyes:      Conjunctiva/sclera: Conjunctivae normal.   Cardiovascular:      Rate and Rhythm: Normal rate and regular rhythm.      Pulses: Normal pulses.      Heart sounds: Normal heart sounds.   Pulmonary:      Effort: Pulmonary effort is normal.      Breath sounds: Normal breath sounds. No wheezing, rhonchi or rales.   Musculoskeletal:      Cervical back: Normal range of motion. No muscular tenderness.   Lymphadenopathy:      Cervical: No cervical adenopathy.   Skin:     General: Skin is warm and dry.      Capillary Refill: Capillary refill takes less than 2 seconds.   Neurological:      Mental Status: He is alert.   Psychiatric:         Mood and Affect: Mood normal.         Thought Content: Thought content normal.         Assessment/Plan:     1. Acute non-recurrent maxillary sinusitis    - amoxicillin-clavulanate (AUGMENTIN) 875-125 MG Tab; Take 1 Tablet by mouth 2 times a day for 7 days.  Dispense: 14 Tablet; Refill: 0      MDM/Comments:    -Nasal spray and allergy medications as directed (Zyrtec or Loratadine).  -You may try saline irrigation or neti pot.   -Drink plenty of fluids.  -Ibuprofen or Tylenol as directed for pain.   -Warm compress to sinuses.      Follow up with primary care provider. Urgently for worsening symptoms, persistent fevers, facial swelling, visual changes, weakness, elevated heart rate, stiff neck, symptoms last longer than 10 days, or  any other concerns.      Differential diagnosis, natural history, supportive care, and indications for immediate follow-up discussed. All questions answered. Patient agrees with the plan of care.    Follow-up as needed if symptoms worsen or fail to improve to PCP, Urgent care or Emergency Room.    I have personally reviewed prior external notes and test results pertinent to today's visit.  I have independently reviewed and interpreted all diagnostics ordered during this urgent care acute visit.   Discussed management options (risks,benefits, and alternatives to treatment). Pt expresses understanding and the treatment plan was agreed upon. Questions were encouraged and answered to pt's satisfaction.    Please note that this dictation was created using voice recognition software. I have made a reasonable attempt to correct obvious errors, but I expect that there are errors of grammar and possibly content that I did not discover before finalizing the note.

## 2022-09-22 ENCOUNTER — APPOINTMENT (OUTPATIENT)
Dept: CARDIOLOGY | Facility: MEDICAL CENTER | Age: 67
End: 2022-09-22
Attending: INTERNAL MEDICINE
Payer: COMMERCIAL

## 2022-09-22 ENCOUNTER — APPOINTMENT (OUTPATIENT)
Dept: RADIOLOGY | Facility: MEDICAL CENTER | Age: 67
End: 2022-09-22
Attending: INTERNAL MEDICINE
Payer: COMMERCIAL

## 2022-11-25 ENCOUNTER — OFFICE VISIT (OUTPATIENT)
Dept: URGENT CARE | Facility: CLINIC | Age: 67
End: 2022-11-25
Payer: COMMERCIAL

## 2022-11-25 VITALS
SYSTOLIC BLOOD PRESSURE: 100 MMHG | BODY MASS INDEX: 28.06 KG/M2 | WEIGHT: 196 LBS | HEART RATE: 88 BPM | DIASTOLIC BLOOD PRESSURE: 58 MMHG | TEMPERATURE: 97.2 F | OXYGEN SATURATION: 97 % | RESPIRATION RATE: 16 BRPM | HEIGHT: 70 IN

## 2022-11-25 DIAGNOSIS — R50.9 FEVER, UNSPECIFIED FEVER CAUSE: ICD-10-CM

## 2022-11-25 DIAGNOSIS — J10.1 INFLUENZA A: ICD-10-CM

## 2022-11-25 LAB
FLUAV+FLUBV AG SPEC QL IA: POSITIVE
INT CON NEG: NORMAL
INT CON POS: NORMAL

## 2022-11-25 PROCEDURE — 99213 OFFICE O/P EST LOW 20 MIN: CPT

## 2022-11-25 PROCEDURE — 87804 INFLUENZA ASSAY W/OPTIC: CPT

## 2022-11-25 RX ORDER — FLUTICASONE PROPIONATE 50 MCG
1 SPRAY, SUSPENSION (ML) NASAL DAILY
Qty: 16 G | Refills: 0 | Status: ON HOLD
Start: 2022-11-25 | End: 2023-06-09

## 2022-11-25 RX ORDER — CIPROFLOXACIN 500 MG/1
TABLET, FILM COATED ORAL
Status: ON HOLD | COMMUNITY
End: 2023-06-09

## 2022-11-25 ASSESSMENT — FIBROSIS 4 INDEX: FIB4 SCORE: 1.33

## 2022-11-25 NOTE — PROGRESS NOTES
Subjective:   Brian Haynes is a 67 y.o. male who presents for Cough (X 4 days, cough, fatigue, negative home covid test on Wednesday, LT ear pain.)      HPI:    Patient presents to urgent care with complaints of cough, excessive fatigue, left ear pain. Patient reports fever (103 F t max). Onset was four days ago. He wants to ensure he does not have an ear infection.   Mild improvement with tylenol, fluids, rest, otc cough and cold medications.   Denies decreased oral intake.   Denies nausea, vomiting, diarrhea  Denies contact with sick individuals  Home covid tests are negative.   Not vaccinated for Influenza.    ROS As above in HPI    Medications:    Current Outpatient Medications on File Prior to Visit   Medication Sig Dispense Refill    Ubrogepant (UBRELVY PO) Take  by mouth.      sumatriptan (IMITREX) 100 MG tablet Take 100 mg by mouth as needed.      famotidine (PEPCID) 20 MG Tab Take 20 mg by mouth every bedtime.      hydrochlorothiazide (MICROZIDE) 12.5 MG capsule TAKE 1 CAP BY MOUTH EVERY DAY. 30 Cap 0    enalapril (VASOTEC) 10 MG TABS TAKE 1 TAB BY MOUTH 2 TIMES A DAY. 30 Each 0    ciprofloxacin (CIPRO) 500 MG Tab ciprofloxacin 500 mg tablet   TAKE ONE TABLET BY MOUTH EVERY 12 HOURS FOR 5 DAYS (Patient not taking: Reported on 11/25/2022)      Abiraterone Acetate 250 MG Tab abiraterone 250 mg tablet (Patient not taking: Reported on 11/25/2022)      Relugolix (ORGOVYX) 120 MG Tab Take 120 mg by mouth every day. (Patient not taking: Reported on 11/25/2022)      predniSONE (DELTASONE) 5 MG Tab Take 5 mg by mouth every day. (Patient not taking: Reported on 11/25/2022)       No current facility-administered medications on file prior to visit.        Allergies:   Patient has no known allergies.    Problem List:   Patient Active Problem List   Diagnosis    Hypertension    Hyperlipidemia    Incarcerated hernia    Acute medial meniscus tear, right, initial encounter    Localized osteoarthritis of right knee  "   Abnormal liver function tests    Acquired stenosis of bladder neck    Malignant neoplasm of prostate (HCC)    Impotence    Overactive bladder    Secondary malignant neoplasm of lymph nodes (HCC)    RBBB    Edema        Surgical History:  Past Surgical History:   Procedure Laterality Date    PB PLASTY KNEE,MED OR LAT COMPARTMT Right 12/9/2019    Procedure: ARTHROPLASTY, KNEE, UNICOMPARTMENTAL - VERSUS;  Surgeon: Olman Baez M.D.;  Location: SURGERY [facility];  Service: Orthopedics    KNEE ARTHROSCOPY Right 9/13/2018    Procedure: KNEE ARTHROSCOPY;  Surgeon: Olman Baez M.D.;  Location: [facility];  Service: Orthopedics    MENISCECTOMY, KNEE, MEDIAL Right 9/13/2018    Procedure: MEDIAL MENISCECTOMY - PARTIAL, KEITH;  Surgeon: Olman Baez M.D.;  Location: [facility];  Service: Orthopedics    BLADDER NECK CONTRACTURE EXICISION  07/2018    INGUINAL HERNIA REPAIR Left 4/13/2017    Procedure: INGUINAL HERNIA REPAIR- OPEN;  Surgeon: Selvin Peña M.D.;  Location: [facility];  Service:     HERNIA REPAIR, INCISIONAL, UNILATERAL Left 11/2014    and by hunter scar    COLONOSCOPY  2014    PROSTATECTOMY, RADIAL  10/2013    robotic for cancer    HUNTER BY LAPAROSCOPY  04/2010    SEPTOPLASTY  1997    DENTAL EXTRACTION(S)  1971    TONSILLECTOMY  1959       Past Social Hx:   Social History     Tobacco Use    Smoking status: Never    Smokeless tobacco: Never   Vaping Use    Vaping Use: Never used   Substance Use Topics    Alcohol use: Yes     Comment: 2 per month    Drug use: Never          Problem list, medications, and allergies reviewed by myself today in Epic.     Objective:     /58   Pulse 88   Temp 36.2 °C (97.2 °F) (Temporal)   Resp 16   Ht 1.778 m (5' 10\")   Wt 88.9 kg (196 lb)   SpO2 97%   BMI 28.12 kg/m²     Physical Exam  Vitals and nursing note reviewed.   Constitutional:       General: He is not in acute distress.     Appearance: " Normal appearance. He is not ill-appearing or diaphoretic.   HENT:      Head: Normocephalic and atraumatic.      Right Ear: Ear canal normal. A middle ear effusion is present. Tympanic membrane is not injected, erythematous or bulging.      Left Ear: Ear canal normal. A middle ear effusion is present. Tympanic membrane is not injected, erythematous or bulging.      Nose: Congestion and rhinorrhea present. Rhinorrhea is clear.      Right Sinus: No maxillary sinus tenderness or frontal sinus tenderness.      Left Sinus: No maxillary sinus tenderness or frontal sinus tenderness.      Mouth/Throat:      Mouth: Mucous membranes are moist.      Pharynx: Oropharynx is clear. Uvula midline. Posterior oropharyngeal erythema present. No pharyngeal swelling or oropharyngeal exudate.      Tonsils: No tonsillar exudate. 1+ on the right. 1+ on the left.      Comments: Posterior pharynx has cobblestone appearance.  Eyes:      Conjunctiva/sclera: Conjunctivae normal.      Pupils: Pupils are equal, round, and reactive to light.   Cardiovascular:      Rate and Rhythm: Normal rate and regular rhythm.      Heart sounds: Normal heart sounds.   Pulmonary:      Effort: No respiratory distress.      Breath sounds: Normal breath sounds and air entry. No decreased breath sounds, wheezing, rhonchi or rales.   Chest:      Chest wall: No tenderness.   Abdominal:      General: Bowel sounds are normal.      Palpations: Abdomen is soft.   Skin:     General: Skin is warm and dry.      Capillary Refill: Capillary refill takes less than 2 seconds.      Findings: No rash.   Neurological:      Mental Status: He is oriented to person, place, and time.       Assessment/Plan:     Results for orders placed or performed in visit on 11/25/22   POCT Influenza A/B   Result Value Ref Range    Rapid Influenza A-B Positive     Internal Control Positive Valid     Internal Control Negative Valid        Diagnosis and associated orders:   1. Fever, unspecified fever  cause  -POCT Influenza A/B    2. Influenza A  - fluticasone (FLONASE) 50 MCG/ACT nasal spray; Administer 1 Spray into affected nostril(S) every day.  Dispense: 16 g; Refill: 0         Comments/MDM:     Poc Influenza A positive. Patient is outside timeframe for effective use of antiviral medications.  Supportive measures encouraged: Rest, increased oral hydration, NSAIDs/tylenol as needed per package instructions, warm humidification, Fluticasone nasal spray, antihistamines, lozenges, remove nasal secretions, elevate HOB.   Return to urgent care prn if new or worsening sx or if there is no improvement in condition prn.   Advised the patient to follow-up with the primary care physician for recheck, reevaluation, and consideration of further management.       Pt is clinically stable at today's acute urgent care visit.  No acute distress noted. Appropriate for outpatient management at this time.       Discussed DDx, management options (risks,benefits, and alternatives to planned treatment), natural progression and supportive care.  Expressed understanding and the treatment plan was agreed upon. Questions were encouraged and answered     Please note that this dictation was created using voice recognition software. I have made a reasonable attempt to correct obvious errors, but I expect that there are errors of grammar and possibly content that I did not discover before finalizing the note.    This note was electronically signed by Tianna Bill DNP

## 2023-02-01 ENCOUNTER — HOSPITAL ENCOUNTER (OUTPATIENT)
Dept: RADIOLOGY | Facility: MEDICAL CENTER | Age: 68
End: 2023-02-01
Attending: UROLOGY
Payer: COMMERCIAL

## 2023-02-01 DIAGNOSIS — Z19.1 HORMONE SENSITIVE MALIGNANCY STATUS: ICD-10-CM

## 2023-02-01 PROCEDURE — A9503 TC99M MEDRONATE: HCPCS

## 2023-02-05 ENCOUNTER — OFFICE VISIT (OUTPATIENT)
Dept: URGENT CARE | Facility: CLINIC | Age: 68
End: 2023-02-05
Payer: COMMERCIAL

## 2023-02-05 VITALS
TEMPERATURE: 97 F | HEIGHT: 70 IN | RESPIRATION RATE: 20 BRPM | WEIGHT: 195 LBS | HEART RATE: 84 BPM | DIASTOLIC BLOOD PRESSURE: 60 MMHG | BODY MASS INDEX: 27.92 KG/M2 | SYSTOLIC BLOOD PRESSURE: 124 MMHG | OXYGEN SATURATION: 96 %

## 2023-02-05 DIAGNOSIS — J06.9 UPPER RESPIRATORY TRACT INFECTION, UNSPECIFIED TYPE: ICD-10-CM

## 2023-02-05 PROCEDURE — 99213 OFFICE O/P EST LOW 20 MIN: CPT | Performed by: PHYSICIAN ASSISTANT

## 2023-02-05 RX ORDER — ACETAMINOPHEN, GUAIFENESIN, AND PHENYLEPHRINE HYDROCHLORIDE 650; 400; 10 MG/20ML; MG/20ML; MG/20ML
SOLUTION ORAL
Status: ON HOLD | COMMUNITY
End: 2023-06-09

## 2023-02-05 ASSESSMENT — ENCOUNTER SYMPTOMS
CHILLS: 0
DIZZINESS: 0
ABDOMINAL PAIN: 0
DIAPHORESIS: 0
SPUTUM PRODUCTION: 0
SORE THROAT: 1
HEADACHES: 0
MYALGIAS: 0
DIARRHEA: 0
SINUS PAIN: 0
NAUSEA: 0
VOMITING: 0
WHEEZING: 0
SHORTNESS OF BREATH: 0
FEVER: 0
PALPITATIONS: 0
COUGH: 1

## 2023-02-05 ASSESSMENT — FIBROSIS 4 INDEX: FIB4 SCORE: 1.33

## 2023-02-05 NOTE — PROGRESS NOTES
Subjective:     CHIEF COMPLAINT  Chief Complaint   Patient presents with    Sinus Problem     Started with sore throat, cough, prone to sinus infections, achy sinus, ear pain on and off, yellow nasal discharge  x5 days         HPI  Brian Haynes is a very pleasant 67 y.o. male who presents to the clinic with URI-like symptoms x5 days.  Patient describes symptoms starting with mild generalized fatigue and body aches.  He then developed sinus congestion, sore throat and cough.  States his cough over the last few days has been fairly persistent.  Today symptoms seem to have greatly improved.  Sinus pressure has decreased.  Experiencing minimal coughing thus far.  No shortness of breath or chest pain.  No recent fevers.  He does endorse a history of multiple sinus infections in the past.  At this time he has been using nasal rinses as well as Mucinex with moderate relief.    REVIEW OF SYSTEMS  Review of Systems   Constitutional:  Positive for malaise/fatigue. Negative for chills, diaphoresis and fever.   HENT:  Positive for congestion and sore throat. Negative for ear pain and sinus pain.    Respiratory:  Positive for cough. Negative for sputum production, shortness of breath and wheezing.    Cardiovascular:  Negative for chest pain and palpitations.   Gastrointestinal:  Negative for abdominal pain, diarrhea, nausea and vomiting.   Musculoskeletal:  Negative for myalgias.   Neurological:  Negative for dizziness and headaches.     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    Localized osteoarthritis of right knee 12/10/2019    Impotence 01/07/2019    Acute medial meniscus tear, right, initial encounter 09/13/2018    Acquired stenosis of bladder neck 03/16/2018    Malignant neoplasm of prostate (HCC)  03/16/2018    Incarcerated hernia 04/13/2017    Hypertension 12/14/2011    Hyperlipidemia 12/14/2011       SURGICAL HISTORY   has a past surgical history that includes prostatectomy, radial (10/2013); inguinal hernia repair (Left, 4/13/2017); bladder neck contracture exicision (07/2018); hernia repair, incisional, unilateral (Left, 11/2014); tonsillectomy (1959); hunter by laparoscopy (04/2010); dental extraction(s) (1971); colonoscopy (2014); knee arthroscopy (Right, 9/13/2018); meniscectomy, knee, medial (Right, 9/13/2018); septoplasty (1997); and plasty knee,med or lat compartmt (Right, 12/9/2019).    ALLERGIES  No Known Allergies    CURRENT MEDICATIONS  Home Medications       Reviewed by Sky Daniel P.A.-C. (Physician Assistant) on 02/05/23 at 1205  Med List Status: <None>     Medication Last Dose Status   Abiraterone Acetate 250 MG Tab  Active   ciprofloxacin (CIPRO) 500 MG Tab  Active   enalapril (VASOTEC) 10 MG TABS Taking Active   famotidine (PEPCID) 20 MG Tab PRN Active   fluticasone (FLONASE) 50 MCG/ACT nasal spray PRN Active   hydrochlorothiazide (MICROZIDE) 12.5 MG capsule Taking Active   Phenylephrine-APAP-guaiFENesin (MUCINEX SINUS-MAX) -400 MG/20ML Liquid Taking Active   predniSONE (DELTASONE) 5 MG Tab  Active   Relugolix (ORGOVYX) 120 MG Tab  Active   sumatriptan (IMITREX) 100 MG tablet PRN Active   Ubrogepant (UBRELVY PO) Taking Active                    SOCIAL HISTORY  Social History     Tobacco Use    Smoking status: Never    Smokeless tobacco: Never   Vaping Use    Vaping Use: Never used   Substance and Sexual Activity    Alcohol use: Not Currently     Comment: 2 per month    Drug use: Never    Sexual activity: Yes     Partners: Female       FAMILY HISTORY  Family History   Problem Relation Age of Onset    Hypertension Mother     Lung Disease Father     Cancer Father     Prostate cancer Father     Thyroid Father     Hypertension Father     Allergies Father     Arthritis Father            "Objective:     VITAL SIGNS: /60 (BP Location: Left arm, Patient Position: Sitting, BP Cuff Size: Adult)   Pulse 84   Temp 36.1 °C (97 °F) (Temporal)   Resp 20   Ht 1.778 m (5' 10\")   Wt 88.5 kg (195 lb)   SpO2 96%   BMI 27.98 kg/m²     PHYSICAL EXAM  Physical Exam  Constitutional:       General: He is not in acute distress.     Appearance: Normal appearance. He is not ill-appearing, toxic-appearing or diaphoretic.   HENT:      Head: Normocephalic and atraumatic.      Right Ear: Tympanic membrane, ear canal and external ear normal.      Left Ear: Tympanic membrane, ear canal and external ear normal.      Nose: Congestion present. No rhinorrhea.      Mouth/Throat:      Mouth: Mucous membranes are moist.      Pharynx: No oropharyngeal exudate or posterior oropharyngeal erythema.      Comments: Posterior oropharynx minimally erythematous.  No postnasal drip or drainage.  No edema present.  No exudates  Eyes:      Conjunctiva/sclera: Conjunctivae normal.   Cardiovascular:      Rate and Rhythm: Normal rate and regular rhythm.      Pulses: Normal pulses.      Heart sounds: Normal heart sounds.   Pulmonary:      Effort: Pulmonary effort is normal.      Breath sounds: Normal breath sounds. No wheezing.   Musculoskeletal:      Cervical back: Normal range of motion. No muscular tenderness.   Lymphadenopathy:      Cervical: No cervical adenopathy.   Skin:     General: Skin is warm and dry.      Capillary Refill: Capillary refill takes less than 2 seconds.   Neurological:      Mental Status: He is alert.   Psychiatric:         Mood and Affect: Mood normal.         Thought Content: Thought content normal.       Assessment/Plan:     1. Upper respiratory tract infection, unspecified type              MDM/Comments:    Please the patient is recovering from a viral URI at this time.  Symptoms started approximately 1 week after receiving his COVID and influenza immunization.  Discussed he is more prone to illness over the " 2-week period following the immunization.  Symptoms have been improving over the last 24 hours.  No signs concerning for bacterial sinusitis at this time.  Advised continued supportive care.  Encouraged to call if symptoms persist or worsen.    -Nasal spray and allergy medications as directed (Zyrtec or Loratadine).  -You may try saline irrigation or neti pot.   -Drink plenty of fluids.  -Ibuprofen or Tylenol as directed for pain.   -Warm compress to sinuses.      Follow up with primary care provider. Urgently for worsening symptoms, persistent fevers, facial swelling, visual changes, weakness, elevated heart rate, stiff neck, symptoms last longer than 10 days, or any other concerns.      Differential diagnosis, natural history, supportive care, and indications for immediate follow-up discussed. All questions answered. Patient agrees with the plan of care.    Follow-up as needed if symptoms worsen or fail to improve to PCP, Urgent care or Emergency Room.    I have personally reviewed prior external notes and test results pertinent to today's visit.  I have independently reviewed and interpreted all diagnostics ordered during this urgent care acute visit.   Discussed management options (risks,benefits, and alternatives to treatment). Pt expresses understanding and the treatment plan was agreed upon. Questions were encouraged and answered to pt's satisfaction.    Please note that this dictation was created using voice recognition software. I have made a reasonable attempt to correct obvious errors, but I expect that there are errors of grammar and possibly content that I did not discover before finalizing the note.

## 2023-02-11 DIAGNOSIS — J01.00 ACUTE NON-RECURRENT MAXILLARY SINUSITIS: ICD-10-CM

## 2023-02-11 RX ORDER — AMOXICILLIN AND CLAVULANATE POTASSIUM 875; 125 MG/1; MG/1
1 TABLET, FILM COATED ORAL 2 TIMES DAILY
Qty: 14 TABLET | Refills: 0 | Status: SHIPPED | OUTPATIENT
Start: 2023-02-11 | End: 2023-02-18

## 2023-04-06 ENCOUNTER — HOSPITAL ENCOUNTER (EMERGENCY)
Facility: MEDICAL CENTER | Age: 68
End: 2023-04-06
Attending: EMERGENCY MEDICINE
Payer: COMMERCIAL

## 2023-04-06 VITALS
DIASTOLIC BLOOD PRESSURE: 80 MMHG | BODY MASS INDEX: 29.35 KG/M2 | WEIGHT: 205 LBS | OXYGEN SATURATION: 97 % | TEMPERATURE: 98 F | HEIGHT: 70 IN | RESPIRATION RATE: 16 BRPM | HEART RATE: 71 BPM | SYSTOLIC BLOOD PRESSURE: 114 MMHG

## 2023-04-06 DIAGNOSIS — H11.32 SUBCONJUNCTIVAL HEMORRHAGE OF LEFT EYE: ICD-10-CM

## 2023-04-06 PROCEDURE — 700101 HCHG RX REV CODE 250: Performed by: EMERGENCY MEDICINE

## 2023-04-06 PROCEDURE — 99283 EMERGENCY DEPT VISIT LOW MDM: CPT

## 2023-04-06 RX ORDER — PROPARACAINE HYDROCHLORIDE 5 MG/ML
1 SOLUTION/ DROPS OPHTHALMIC ONCE
Status: COMPLETED | OUTPATIENT
Start: 2023-04-06 | End: 2023-04-06

## 2023-04-06 RX ADMIN — PROPARACAINE HYDROCHLORIDE 1 DROP: 5 SOLUTION/ DROPS OPHTHALMIC at 07:45

## 2023-04-06 ASSESSMENT — FIBROSIS 4 INDEX: FIB4 SCORE: 1.33

## 2023-04-06 NOTE — DISCHARGE INSTRUCTIONS
Follow-up with ophthalmology within 48 hours for reevaluation.  Call 's office, reference this emergency department visit and schedule an appointment for follow-up.    Tylenol up to 1000 mg every 6 hours as needed for discomfort.    Limit activity and lifting until seen for reevaluation.    Continue other home medications as previously indicated.    Return to the emergency department for eye pain, swelling, increased hemorrhage, visual changes, headache, vomiting, fever or other new concerns.

## 2023-04-06 NOTE — ED PROVIDER NOTES
"ED Provider Note    CHIEF COMPLAINT  Chief Complaint   Patient presents with    Eye Injury     Suddenly woke up 1hr pta with blood in eye pt reports pressure no trauma   \"Things look blurry\"       EXTERNAL RECORDS REVIEWED  Outpatient Notes hx sinus infections, HTN, hyperlipidemia, prostate cancer    HPI/ROS  LIMITATION TO HISTORY   Select: : None  OUTSIDE HISTORIAN(S):  None    Brian Haynes is a 67 y.o. male who presents to the emergency department through triage for red, swollen eye.  Patient states he awoke with \"bleeding around my eye.\"  Patient states he has had similar, subconjunctival hemorrhage, previously but never with this amount of swelling.  He denies trauma, injury but cannot exclude rubbing or scratching during sleep.  He also describes a nosebleed last night, resolved after some time with direct pressure over the nose.  Denies history of recurrent nosebleeds or other bleeding.  Denies aspirin use or other anticoagulation.  Denies recent illness, cough, congestion or sneezing.    PAST MEDICAL HISTORY   has a past medical history of Anesthesia, Arthritis, Cancer (HCC) (10/2013), Dermatitis, Disorder of thyroid, Hayfever, Heart burn, High cholesterol, Hypertension, Indigestion, Migraines, and Right knee pain (12/05/2019).    SURGICAL HISTORY   has a past surgical history that includes prostatectomy, radial (10/2013); inguinal hernia repair (Left, 4/13/2017); bladder neck contracture exicision (07/2018); hernia repair, incisional, unilateral (Left, 11/2014); tonsillectomy (1959); hunter by laparoscopy (04/2010); dental extraction(s) (1971); colonoscopy (2014); knee arthroscopy (Right, 9/13/2018); meniscectomy, knee, medial (Right, 9/13/2018); septoplasty (1997); and plasty knee,med or lat compartmt (Right, 12/9/2019).    FAMILY HISTORY  Family History   Problem Relation Age of Onset    Hypertension Mother     Lung Disease Father     Cancer Father     Prostate cancer Father     Thyroid Father     " "Hypertension Father     Allergies Father     Arthritis Father        SOCIAL HISTORY  Social History     Tobacco Use    Smoking status: Never    Smokeless tobacco: Never   Vaping Use    Vaping Use: Never used   Substance and Sexual Activity    Alcohol use: Not Currently     Comment: 2 per month    Drug use: Never    Sexual activity: Yes     Partners: Female       CURRENT MEDICATIONS  Home Medications    **Home medications have not yet been reviewed for this encounter**         ALLERGIES  No Known Allergies    PHYSICAL EXAM  VITAL SIGNS: /80   Pulse 71   Temp 37.1 °C (98.7 °F) (Temporal)   Resp 18   Ht 1.77 m (5' 9.69\")   Wt 93 kg (205 lb)   SpO2 97%   BMI 29.68 kg/m²    Pulse ox interpretation: I interpret this pulse ox as normal.  Constitutional: Alert in no apparent distress.  HENT: Normocephalic, atraumatic. Bilateral external ears normal, Nose normal. Moist mucous membranes.    Eyes: Pupils are equal and reactive, Conjunctiva normal. Left near circumferential subconjunctival hemorrhage with swelling/chemosis.  The cornea, iris, pupil are spared.  No hyphema.  EOMI without pain or resistance.  No periorbital swelling, erythema or ecchymosis.  Eye exam: IOP: Right 12, 11, 12 and left: 12, 12, 12.  See nurses notes for visual acuity  Neck: Normal range of motion  Cardiovascular: Normal peripheral perfusion  Thorax & Lungs: Nonlabored respirations   Skin: Warm, Dry  Musculoskeletal: Good range of motion in all major joints.  Neurologic: Alert and orient x4.  Speech clear and cohesive.  Ambulates independently  Psychiatric: Affect normal, Judgment normal, Mood normal.       COURSE & MEDICAL DECISION MAKING    ED Observation Status? No; Patient does not meet criteria for ED Observation.     INITIAL ASSESSMENT, COURSE AND PLAN  Care Narrative:   ED evaluation most consistent with subconjunctival hemorrhage, given the amount of hemorrhage there is some chemosis as well.  Normal cornea, iris, pupil spared.  " No hyphema.  Extraocular muscle movement intact without pain or resistance.  No periorbital swelling nor cellulitis.   intraocular pressures are normal, appropriate bilaterally.  Visual acuity is affected on the left, but patient does have pretty significant chemosis as well as tearing which she believes is causing the blurring and it clears at times.  No purulent drainage or evidence for conjunctivitis.  Suspect sequela of recent epistaxis although other trauma while asleep last night cannot be excluded.  He does have history of sinus infections, but denies any current symptoms, recent nose blowing or sneezing otherwise to induce such microtrauma and hemorrhage, although this cannot be excluded either.    ADDITIONAL PROBLEM LIST  Hypertension  Hyperlipidemia  Prostate cancer    DISPOSITION AND DISCUSSIONS    Escalation of care considered, and ultimately not performed:acute inpatient care management, however at this time, the patient is most appropriate for outpatient management    Decision tools and prescription drugs considered including, but not limited to: Antibiotics not indicated at this time .    Patient is stable for discharge home, anticipatory guidance provided, Tylenol for discomfort, may continue other home medications as previously indicated, follow-up with ophthalmology as encourages week and strict ED return instructions have been detailed.  Patient is aware of the findings and agreeable to the disposition and plan.    FINAL DIAGNOSIS  1. Subconjunctival hemorrhage of left eye           Electronically signed by: Beena Paz D.O., 4/6/2023 8:04 AM

## 2023-06-07 ENCOUNTER — APPOINTMENT (OUTPATIENT)
Dept: RADIOLOGY | Facility: MEDICAL CENTER | Age: 68
End: 2023-06-07
Attending: EMERGENCY MEDICINE
Payer: COMMERCIAL

## 2023-06-07 ENCOUNTER — HOSPITAL ENCOUNTER (EMERGENCY)
Facility: MEDICAL CENTER | Age: 68
End: 2023-06-07
Attending: EMERGENCY MEDICINE
Payer: COMMERCIAL

## 2023-06-07 VITALS
DIASTOLIC BLOOD PRESSURE: 87 MMHG | HEART RATE: 61 BPM | WEIGHT: 201.5 LBS | TEMPERATURE: 97.8 F | BODY MASS INDEX: 28.85 KG/M2 | HEIGHT: 70 IN | SYSTOLIC BLOOD PRESSURE: 136 MMHG | OXYGEN SATURATION: 93 % | RESPIRATION RATE: 20 BRPM

## 2023-06-07 DIAGNOSIS — M51.36 L4-L5 DISC BULGE: ICD-10-CM

## 2023-06-07 DIAGNOSIS — M89.9 LYTIC LESION OF BONE ON X-RAY: ICD-10-CM

## 2023-06-07 DIAGNOSIS — M54.42 ACUTE MIDLINE LOW BACK PAIN WITH LEFT-SIDED SCIATICA: ICD-10-CM

## 2023-06-07 PROCEDURE — 99284 EMERGENCY DEPT VISIT MOD MDM: CPT

## 2023-06-07 PROCEDURE — 72131 CT LUMBAR SPINE W/O DYE: CPT

## 2023-06-07 PROCEDURE — 96374 THER/PROPH/DIAG INJ IV PUSH: CPT

## 2023-06-07 PROCEDURE — 700111 HCHG RX REV CODE 636 W/ 250 OVERRIDE (IP): Performed by: EMERGENCY MEDICINE

## 2023-06-07 PROCEDURE — 72148 MRI LUMBAR SPINE W/O DYE: CPT

## 2023-06-07 PROCEDURE — 96375 TX/PRO/DX INJ NEW DRUG ADDON: CPT

## 2023-06-07 RX ORDER — MORPHINE SULFATE 4 MG/ML
4 INJECTION INTRAVENOUS ONCE
Status: COMPLETED | OUTPATIENT
Start: 2023-06-07 | End: 2023-06-07

## 2023-06-07 RX ORDER — OXYCODONE HYDROCHLORIDE AND ACETAMINOPHEN 5; 325 MG/1; MG/1
1 TABLET ORAL EVERY 4 HOURS PRN
Qty: 15 TABLET | Refills: 0 | Status: SHIPPED | OUTPATIENT
Start: 2023-06-07 | End: 2023-06-10

## 2023-06-07 RX ORDER — LIDOCAINE 50 MG/G
1 PATCH TOPICAL EVERY 24 HOURS
Qty: 10 PATCH | Refills: 0 | Status: SHIPPED | OUTPATIENT
Start: 2023-06-07

## 2023-06-07 RX ORDER — PREDNISONE 50 MG/1
50 TABLET ORAL DAILY
Qty: 5 TABLET | Refills: 0 | Status: SHIPPED | OUTPATIENT
Start: 2023-06-07 | End: 2023-06-12

## 2023-06-07 RX ORDER — ONDANSETRON 2 MG/ML
4 INJECTION INTRAMUSCULAR; INTRAVENOUS ONCE
Status: COMPLETED | OUTPATIENT
Start: 2023-06-07 | End: 2023-06-07

## 2023-06-07 RX ADMIN — MORPHINE SULFATE 4 MG: 4 INJECTION INTRAVENOUS at 15:07

## 2023-06-07 RX ADMIN — ONDANSETRON 4 MG: 2 INJECTION INTRAMUSCULAR; INTRAVENOUS at 15:07

## 2023-06-07 ASSESSMENT — PAIN DESCRIPTION - PAIN TYPE
TYPE: ACUTE PAIN
TYPE: ACUTE PAIN

## 2023-06-07 ASSESSMENT — FIBROSIS 4 INDEX: FIB4 SCORE: 1.33

## 2023-06-07 NOTE — ED TRIAGE NOTES
Pt comes by himself  was sent here from [facility] in [place]   here for c/o lower back pain and sciatic  pain L side   no true Hx of sure  denies injury  YOSSI concerned due to pt's c/o incontinence of urine that started a couple of days ago at night time   pt is being treated for prostate CA    ambulating staring and steady noted

## 2023-06-07 NOTE — ED PROVIDER NOTES
ED Provider Note    Primary care provider: Sky MATSON M.D.    CHIEF COMPLAINT  Chief Complaint   Patient presents with    Back Pain     lower back pain that stated about 3-4 weeks ago after recent foot surgery/pain   had to walk differently  L side   sciatica pain       Incontinence     Started a few days ago  while at night        HPI  Brian Haynes is a 67 y.o. male with history of prostate cancer, resection, on Lupron therapy who presents to the Emergency Department severe back pain over the past few weeks.  He normally uses a standing desk work, but was unable to as about 3 weeks ago he had a plantar wart removed on one of his feet.  As result he was sitting at work and he felt that that may have caused an exacerbation of some back pain.  He has been seeing chiropractics for the past week or so but the back pain has steadily worsened.  He has been sleeping in a supine position on the floor at night to help with the pain.  Over the past few weeks he has noted slapping of the left foot when he walks, some slight weakness in the lifting ability of his left foot.    Over the past 2 or 3 nights he has noted incontinence when he sleeps.  He was using a pad when he sleeps.  He notes when he wakes up, there is constant draining of urine, he does not feel the voiding just notes he is wet.  He has never had this previously.  He has not had this happen during the day.    He denies any fevers or chills.  He denies any new numbness, though the left lower leg does feel slightly different.  He went to the [facility] in [place] today and they told him to come to the emergency department for further evaluation.    He has never had any back surgeries or prior imaging of the lumbar spine.      External Record Review: Last seen in our emergency department in April for subconjunctival hemorrhage, last seen in urology [facility] 5/5, history of overactive bladder, prostate cancer    REVIEW OF  "SYSTEMS  See HPI.     PAST MEDICAL HISTORY   has a past medical history of Anesthesia, Arthritis, Cancer (HCC) (10/2013), Dermatitis, Disorder of thyroid, Hayfever, Heart burn, High cholesterol, Hypertension, Indigestion, Migraines, and Right knee pain (12/05/2019).    SURGICAL HISTORY   has a past surgical history that includes prostatectomy, radial (10/2013); inguinal hernia repair (Left, 4/13/2017); bladder neck contracture exicision (07/2018); hernia repair, incisional, unilateral (Left, 11/2014); tonsillectomy (1959); hunter by laparoscopy (04/2010); dental extraction(s) (1971); colonoscopy (2014); knee arthroscopy (Right, 9/13/2018); meniscectomy, knee, medial (Right, 9/13/2018); septoplasty (1997); and plasty knee,med or lat compartmt (Right, 12/9/2019).    SOCIAL HISTORY  Social History     Tobacco Use    Smoking status: Never    Smokeless tobacco: Never   Vaping Use    Vaping Use: Never used   Substance Use Topics    Alcohol use: Not Currently     Comment: 2 per month    Drug use: Yes     Types: Oral     Comment: gummies  once every 3 months for sleep      Social History     Substance and Sexual Activity   Drug Use Yes    Types: Oral    Comment: gummies  once every 3 months for sleep       FAMILY HISTORY  Family History   Problem Relation Age of Onset    Hypertension Mother     Lung Disease Father     Cancer Father     Prostate cancer Father     Thyroid Father     Hypertension Father     Allergies Father     Arthritis Father        CURRENT MEDICATIONS  Reviewed.  See Encounter Summary.     ALLERGIES  No Known Allergies    PHYSICAL EXAM  VITAL SIGNS: /87   Pulse 61   Temp 36.6 °C (97.8 °F) (Temporal)   Resp 20   Ht 1.778 m (5' 10\")   Wt 91.4 kg (201 lb 8 oz)   SpO2 93%   BMI 28.91 kg/m²   Constitutional: Awake, alert in no apparent distress.  HENT: Normocephalic, Bilateral external ears normal. Nose normal.   Eyes: Conjunctiva normal, non-icteric, EOMI.    Thorax & Lungs: Easy unlabored " respirations, Clear to ascultation bilaterally.  Cardiovascular: Regular rate, Regular rhythm, No murmurs, rubs or gallops. Bilateral pulses symmetrical.   Abdomen:  Soft, nontender, nondistended, normal active bowel sounds.   :    Skin: Visualized skin is  Dry, No erythema, No rash.   Musculoskeletal:   No cyanosis, clubbing or edema. No leg asymmetry.  Back is normal in appearance without any midline tenderness or step-off, location of the patient's back pain is the left paraspinal area at around the L4-L5 level, extending through the buttock region.  Not reproducible with deep palpation.  Neurologic: Alert, Grossly non-focal.  No saddle paresthesias.  Dorsiflexion on the left leg is 5 -/5, remainder of strength throughout the bilateral lower extremities normal.  No pathologic reflexes.  Psychiatric: Normal affect, Normal mood  Lymphatic:             RADIOLOGY  I have independently interpreted the diagnostic imaging associated with this visit and am waiting the final reading from the radiologist.   My preliminary interpretation is as follows: Anterior body of L4 appears to have significant infiltrative process, disc spacing appears normal.    Radiologist interpretation:   CT-LSPINE W/O PLUS RECONS   Final Result      1.  Chronic appearing lytic/sclerotic lesion involving the anterior L4 vertebral body, with chronic inferior endplate deformity.   2.  No other lumbar spinal lesions. No fracture or listhesis.      MR-LUMBAR SPINE-W/O   Final Result      1.  There is an approximately 16 mm sized focal sclerotic area in the anterior portion of the L4 vertebral body. This is new since the previous CT scan dated 6/25/2020. In view of history of prostate carcinoma, this is concerning for sclerotic    metastasis. The other differential diagnosis includes Schmorl's node/degenerative sclerosis. Bone scan and or CT scan of the lumbar spine is recommended for further evaluation.   2.  Degenerative disease as described above.           COURSE & MEDICAL DECISION MAKING  Pertinent Labs & Imaging studies reviewed. (See chart for details)    COURSE & MEDICAL DECISION MAKING  Pertinent Labs & Imaging studies reviewed. (See chart for details)    Differential diagnoses include but are not limited to: Metastatic disease to the spine, cauda equina, HNP    2:22 PM - Nursing notes reviewed, patient seen and examined at bedside.    ED Observation Status? Yes; I am placing the patient in to an observation status due to a diagnostic uncertainty as well as therapeutic intensity. Patient placed in observation status at 2:35 PM    Observation plan is as follows: MRI, consider transfer    Upon Reevaluation, the patient's condition has: Improved    Patient discharged from ED Observation status at 6 PM    Discussion of management with other medical personnel: Case discussed with Dr. Nichole, orthospine.  See below.    Escalation of care considered, and ultimately not performed: acute inpatient care management, however at this time, the patient is most appropriate for outpatient management.      Decision tools and prescription drugs considered including, but not limited to: Opiate prescription MD Calc    Decision Making:  This is a pleasant 67 y.o. year old male who presents with severe back pain radiating down the left leg, nocturnal enuresis, slight decrease in dorsiflexion of the left foot.  MRI does show a lytic lesion which is new compared to prior CTs, possibly metastatic disease.  The patient states his PSA and PET/CT a few months ago was normal.  He does also have a small herniated disc with some nerve root compression spent on the left side.  I discussed the case with orthopedic spine, Dr. Nichole.  He graciously reviewed the MRI images.  No findings on imaging today that would cause cauda equina.  I obtained a bladder scan that showed only 200 cc of urine.  The patient has not had urinary incontinence during the day, therefore very unlikely to be  neurogenic bladder.    At this time he does not need emergent neurosurgical compression.  We will treat his pain with steroids, opioid medications.  He will follow-up with orthopedic spine tomorrow at 11:30 AM.        In prescribing controlled substances to this patient, I certify that I have obtained and reviewed the medical history of Brian Haynes. I have also made a good harish effort to obtain applicable records from other providers who have treated the patient and records did not demonstrate any increased risk of substance abuse that would prevent me from prescribing controlled substances.     I have conducted a physical exam and documented it. I have reviewed Mr. Haynes’s prescription history as maintained by the [place] Prescription Monitoring Program.     I have assessed the patient’s risk for abuse, dependency, and addiction using the validated Opioid Risk Tool available at https://www.mdcalc.com/jwvnqj-abnt-ppvx-ort-narcotic-abuse.     Given the above, I believe the benefits of controlled substance therapy outweigh the risks. The reasons for prescribing controlled substances include non-narcotic, oral analgesic alternatives have been inadequate for pain control. Accordingly, I have discussed the risk and benefits, treatment plan, and alternative therapies with the patient.        The patient was discharged home (see d/c instructions) was told to return immediately for any signs or symptoms listed, or any worsening at all.  The patient verbally agreed to the discharge precautions and follow-up plan which is documented in EPIC.    Discharge Medications:  Discharge Medication List as of 6/7/2023  6:00 PM        START taking these medications    Details   oxyCODONE-acetaminophen (PERCOCET) 5-325 MG Tab Take 1 Tablet by mouth every four hours as needed for Moderate Pain for up to 3 days., Disp-15 Tablet, R-0, Normal      lidocaine (LIDODERM) 5 % Patch Place 1 Patch on the skin every 24 hours., Disp-10  Patch, R-0, Normal             FINAL IMPRESSION  1. Acute midline low back pain with left-sided sciatica    2. L4-L5 disc bulge    3. Lytic lesion of bone on x-ray

## 2023-06-08 ENCOUNTER — ANESTHESIA EVENT (OUTPATIENT)
Dept: SURGERY | Facility: MEDICAL CENTER | Age: 68
End: 2023-06-08
Payer: COMMERCIAL

## 2023-06-08 ENCOUNTER — APPOINTMENT (OUTPATIENT)
Dept: ADMISSIONS | Facility: MEDICAL CENTER | Age: 68
End: 2023-06-08
Attending: ORTHOPAEDIC SURGERY
Payer: COMMERCIAL

## 2023-06-08 NOTE — ED NOTES
Pt D/C to home. IV removed. D/C instructions and prescriptions given. Pt v/u. Controlled substance consent signed and on chart. Pt leaves ED with no acute changes, complaints or concerns.

## 2023-06-09 ENCOUNTER — APPOINTMENT (OUTPATIENT)
Dept: RADIOLOGY | Facility: MEDICAL CENTER | Age: 68
End: 2023-06-09
Attending: ORTHOPAEDIC SURGERY
Payer: COMMERCIAL

## 2023-06-09 ENCOUNTER — HOSPITAL ENCOUNTER (OUTPATIENT)
Facility: MEDICAL CENTER | Age: 68
End: 2023-06-09
Attending: ORTHOPAEDIC SURGERY | Admitting: ORTHOPAEDIC SURGERY
Payer: COMMERCIAL

## 2023-06-09 ENCOUNTER — ANESTHESIA (OUTPATIENT)
Dept: SURGERY | Facility: MEDICAL CENTER | Age: 68
End: 2023-06-09
Payer: COMMERCIAL

## 2023-06-09 VITALS
OXYGEN SATURATION: 96 % | HEART RATE: 72 BPM | DIASTOLIC BLOOD PRESSURE: 78 MMHG | TEMPERATURE: 97.9 F | SYSTOLIC BLOOD PRESSURE: 132 MMHG | RESPIRATION RATE: 15 BRPM | HEIGHT: 70 IN | BODY MASS INDEX: 28.66 KG/M2 | WEIGHT: 200.18 LBS

## 2023-06-09 DIAGNOSIS — G89.18 POST-OPERATIVE PAIN: ICD-10-CM

## 2023-06-09 LAB
ALBUMIN SERPL BCP-MCNC: 4.4 G/DL (ref 3.2–4.9)
ALBUMIN/GLOB SERPL: 1.8 G/DL
ALP SERPL-CCNC: 74 U/L (ref 30–99)
ALT SERPL-CCNC: 31 U/L (ref 2–50)
ANION GAP SERPL CALC-SCNC: 14 MMOL/L (ref 7–16)
APTT PPP: 20.3 SEC (ref 24.7–36)
AST SERPL-CCNC: 23 U/L (ref 12–45)
BASOPHILS # BLD AUTO: 0.3 % (ref 0–1.8)
BASOPHILS # BLD: 0.03 K/UL (ref 0–0.12)
BILIRUB SERPL-MCNC: 0.4 MG/DL (ref 0.1–1.5)
BUN SERPL-MCNC: 30 MG/DL (ref 8–22)
CALCIUM ALBUM COR SERPL-MCNC: 9.2 MG/DL (ref 8.5–10.5)
CALCIUM SERPL-MCNC: 9.5 MG/DL (ref 8.5–10.5)
CHLORIDE SERPL-SCNC: 102 MMOL/L (ref 96–112)
CO2 SERPL-SCNC: 22 MMOL/L (ref 20–33)
CREAT SERPL-MCNC: 1.13 MG/DL (ref 0.5–1.4)
EKG IMPRESSION: NORMAL
EOSINOPHIL # BLD AUTO: 0.18 K/UL (ref 0–0.51)
EOSINOPHIL NFR BLD: 2 % (ref 0–6.9)
ERYTHROCYTE [DISTWIDTH] IN BLOOD BY AUTOMATED COUNT: 41.9 FL (ref 35.9–50)
GFR SERPLBLD CREATININE-BSD FMLA CKD-EPI: 72 ML/MIN/1.73 M 2
GLOBULIN SER CALC-MCNC: 2.5 G/DL (ref 1.9–3.5)
GLUCOSE SERPL-MCNC: 87 MG/DL (ref 65–99)
HCT VFR BLD AUTO: 42.3 % (ref 42–52)
HGB BLD-MCNC: 14.6 G/DL (ref 14–18)
IMM GRANULOCYTES # BLD AUTO: 0.06 K/UL (ref 0–0.11)
IMM GRANULOCYTES NFR BLD AUTO: 0.7 % (ref 0–0.9)
INR PPP: 0.92 (ref 0.87–1.13)
LYMPHOCYTES # BLD AUTO: 1.92 K/UL (ref 1–4.8)
LYMPHOCYTES NFR BLD: 21.1 % (ref 22–41)
MCH RBC QN AUTO: 31.5 PG (ref 27–33)
MCHC RBC AUTO-ENTMCNC: 34.5 G/DL (ref 32.3–36.5)
MCV RBC AUTO: 91.2 FL (ref 81.4–97.8)
MONOCYTES # BLD AUTO: 0.97 K/UL (ref 0–0.85)
MONOCYTES NFR BLD AUTO: 10.7 % (ref 0–13.4)
NEUTROPHILS # BLD AUTO: 5.94 K/UL (ref 1.82–7.42)
NEUTROPHILS NFR BLD: 65.2 % (ref 44–72)
NRBC # BLD AUTO: 0 K/UL
NRBC BLD-RTO: 0 /100 WBC (ref 0–0.2)
PLATELET # BLD AUTO: 241 K/UL (ref 164–446)
PMV BLD AUTO: 8.6 FL (ref 9–12.9)
POTASSIUM SERPL-SCNC: 3.5 MMOL/L (ref 3.6–5.5)
PROT SERPL-MCNC: 6.9 G/DL (ref 6–8.2)
PROTHROMBIN TIME: 12.3 SEC (ref 12–14.6)
RBC # BLD AUTO: 4.64 M/UL (ref 4.7–6.1)
SODIUM SERPL-SCNC: 138 MMOL/L (ref 135–145)
WBC # BLD AUTO: 9.1 K/UL (ref 4.8–10.8)

## 2023-06-09 PROCEDURE — 80053 COMPREHEN METABOLIC PANEL: CPT

## 2023-06-09 PROCEDURE — 36415 COLL VENOUS BLD VENIPUNCTURE: CPT

## 2023-06-09 PROCEDURE — 85025 COMPLETE CBC W/AUTO DIFF WBC: CPT

## 2023-06-09 PROCEDURE — 700105 HCHG RX REV CODE 258: Performed by: ORTHOPAEDIC SURGERY

## 2023-06-09 PROCEDURE — 160046 HCHG PACU - 1ST 60 MINS PHASE II: Performed by: ORTHOPAEDIC SURGERY

## 2023-06-09 PROCEDURE — 93005 ELECTROCARDIOGRAM TRACING: CPT | Performed by: ORTHOPAEDIC SURGERY

## 2023-06-09 PROCEDURE — 00630 ANES PX LUMBAR REGION NOS: CPT | Performed by: STUDENT IN AN ORGANIZED HEALTH CARE EDUCATION/TRAINING PROGRAM

## 2023-06-09 PROCEDURE — 160002 HCHG RECOVERY MINUTES (STAT): Performed by: ORTHOPAEDIC SURGERY

## 2023-06-09 PROCEDURE — 82652 VIT D 1 25-DIHYDROXY: CPT

## 2023-06-09 PROCEDURE — 700102 HCHG RX REV CODE 250 W/ 637 OVERRIDE(OP): Performed by: STUDENT IN AN ORGANIZED HEALTH CARE EDUCATION/TRAINING PROGRAM

## 2023-06-09 PROCEDURE — 700101 HCHG RX REV CODE 250: Performed by: ORTHOPAEDIC SURGERY

## 2023-06-09 PROCEDURE — 700101 HCHG RX REV CODE 250: Performed by: STUDENT IN AN ORGANIZED HEALTH CARE EDUCATION/TRAINING PROGRAM

## 2023-06-09 PROCEDURE — 110371 HCHG SHELL REV 272: Performed by: ORTHOPAEDIC SURGERY

## 2023-06-09 PROCEDURE — 93010 ELECTROCARDIOGRAM REPORT: CPT | Performed by: INTERNAL MEDICINE

## 2023-06-09 PROCEDURE — 160009 HCHG ANES TIME/MIN: Performed by: ORTHOPAEDIC SURGERY

## 2023-06-09 PROCEDURE — 72020 X-RAY EXAM OF SPINE 1 VIEW: CPT

## 2023-06-09 PROCEDURE — 85610 PROTHROMBIN TIME: CPT

## 2023-06-09 PROCEDURE — 700111 HCHG RX REV CODE 636 W/ 250 OVERRIDE (IP): Performed by: STUDENT IN AN ORGANIZED HEALTH CARE EDUCATION/TRAINING PROGRAM

## 2023-06-09 PROCEDURE — 700111 HCHG RX REV CODE 636 W/ 250 OVERRIDE (IP): Performed by: ORTHOPAEDIC SURGERY

## 2023-06-09 PROCEDURE — 85730 THROMBOPLASTIN TIME PARTIAL: CPT

## 2023-06-09 PROCEDURE — 700105 HCHG RX REV CODE 258: Performed by: STUDENT IN AN ORGANIZED HEALTH CARE EDUCATION/TRAINING PROGRAM

## 2023-06-09 PROCEDURE — 160041 HCHG SURGERY MINUTES - EA ADDL 1 MIN LEVEL 4: Performed by: ORTHOPAEDIC SURGERY

## 2023-06-09 PROCEDURE — 160025 RECOVERY II MINUTES (STATS): Performed by: ORTHOPAEDIC SURGERY

## 2023-06-09 PROCEDURE — A9270 NON-COVERED ITEM OR SERVICE: HCPCS | Performed by: STUDENT IN AN ORGANIZED HEALTH CARE EDUCATION/TRAINING PROGRAM

## 2023-06-09 PROCEDURE — 160029 HCHG SURGERY MINUTES - 1ST 30 MINS LEVEL 4: Performed by: ORTHOPAEDIC SURGERY

## 2023-06-09 PROCEDURE — 160035 HCHG PACU - 1ST 60 MINS PHASE I: Performed by: ORTHOPAEDIC SURGERY

## 2023-06-09 PROCEDURE — 160048 HCHG OR STATISTICAL LEVEL 1-5: Performed by: ORTHOPAEDIC SURGERY

## 2023-06-09 RX ORDER — HYDRALAZINE HYDROCHLORIDE 20 MG/ML
5 INJECTION INTRAMUSCULAR; INTRAVENOUS
Status: DISCONTINUED | OUTPATIENT
Start: 2023-06-09 | End: 2023-06-09 | Stop reason: HOSPADM

## 2023-06-09 RX ORDER — HYDROMORPHONE HYDROCHLORIDE 1 MG/ML
0.1 INJECTION, SOLUTION INTRAMUSCULAR; INTRAVENOUS; SUBCUTANEOUS
Status: DISCONTINUED | OUTPATIENT
Start: 2023-06-09 | End: 2023-06-09 | Stop reason: HOSPADM

## 2023-06-09 RX ORDER — SODIUM CHLORIDE, SODIUM LACTATE, POTASSIUM CHLORIDE, CALCIUM CHLORIDE 600; 310; 30; 20 MG/100ML; MG/100ML; MG/100ML; MG/100ML
INJECTION, SOLUTION INTRAVENOUS CONTINUOUS
Status: DISCONTINUED | OUTPATIENT
Start: 2023-06-09 | End: 2023-06-09 | Stop reason: HOSPADM

## 2023-06-09 RX ORDER — ACETAMINOPHEN AND CODEINE PHOSPHATE 300; 30 MG/1; MG/1
1-2 TABLET ORAL EVERY 4 HOURS PRN
Qty: 30 TABLET | Refills: 0 | Status: SHIPPED | OUTPATIENT
Start: 2023-06-09 | End: 2023-06-16

## 2023-06-09 RX ORDER — ONDANSETRON 2 MG/ML
4 INJECTION INTRAMUSCULAR; INTRAVENOUS
Status: DISCONTINUED | OUTPATIENT
Start: 2023-06-09 | End: 2023-06-09 | Stop reason: HOSPADM

## 2023-06-09 RX ORDER — OXYCODONE HCL 5 MG/5 ML
5 SOLUTION, ORAL ORAL
Status: COMPLETED | OUTPATIENT
Start: 2023-06-09 | End: 2023-06-09

## 2023-06-09 RX ORDER — HYDROMORPHONE HYDROCHLORIDE 1 MG/ML
0.2 INJECTION, SOLUTION INTRAMUSCULAR; INTRAVENOUS; SUBCUTANEOUS
Status: DISCONTINUED | OUTPATIENT
Start: 2023-06-09 | End: 2023-06-09 | Stop reason: HOSPADM

## 2023-06-09 RX ORDER — SODIUM CHLORIDE, SODIUM LACTATE, POTASSIUM CHLORIDE, CALCIUM CHLORIDE 600; 310; 30; 20 MG/100ML; MG/100ML; MG/100ML; MG/100ML
INJECTION, SOLUTION INTRAVENOUS CONTINUOUS
Status: ACTIVE | OUTPATIENT
Start: 2023-06-09 | End: 2023-06-09

## 2023-06-09 RX ORDER — EPHEDRINE SULFATE 50 MG/ML
5 INJECTION, SOLUTION INTRAVENOUS
Status: DISCONTINUED | OUTPATIENT
Start: 2023-06-09 | End: 2023-06-09 | Stop reason: HOSPADM

## 2023-06-09 RX ORDER — HALOPERIDOL 5 MG/ML
1 INJECTION INTRAMUSCULAR
Status: DISCONTINUED | OUTPATIENT
Start: 2023-06-09 | End: 2023-06-09 | Stop reason: HOSPADM

## 2023-06-09 RX ORDER — OXYCODONE HCL 5 MG/5 ML
10 SOLUTION, ORAL ORAL
Status: COMPLETED | OUTPATIENT
Start: 2023-06-09 | End: 2023-06-09

## 2023-06-09 RX ORDER — EPHEDRINE SULFATE 50 MG/ML
INJECTION, SOLUTION INTRAVENOUS PRN
Status: DISCONTINUED | OUTPATIENT
Start: 2023-06-09 | End: 2023-06-09 | Stop reason: SURG

## 2023-06-09 RX ORDER — SUCCINYLCHOLINE CHLORIDE 20 MG/ML
INJECTION INTRAMUSCULAR; INTRAVENOUS PRN
Status: DISCONTINUED | OUTPATIENT
Start: 2023-06-09 | End: 2023-06-09 | Stop reason: SURG

## 2023-06-09 RX ORDER — GABAPENTIN 300 MG/1
300 CAPSULE ORAL ONCE
Status: COMPLETED | OUTPATIENT
Start: 2023-06-09 | End: 2023-06-09

## 2023-06-09 RX ORDER — BUPIVACAINE HYDROCHLORIDE AND EPINEPHRINE 5; 5 MG/ML; UG/ML
INJECTION, SOLUTION EPIDURAL; INTRACAUDAL; PERINEURAL
Status: DISCONTINUED | OUTPATIENT
Start: 2023-06-09 | End: 2023-06-09 | Stop reason: HOSPADM

## 2023-06-09 RX ORDER — HYDROMORPHONE HYDROCHLORIDE 2 MG/ML
INJECTION, SOLUTION INTRAMUSCULAR; INTRAVENOUS; SUBCUTANEOUS PRN
Status: DISCONTINUED | OUTPATIENT
Start: 2023-06-09 | End: 2023-06-09 | Stop reason: SURG

## 2023-06-09 RX ORDER — HYDROMORPHONE HYDROCHLORIDE 1 MG/ML
0.4 INJECTION, SOLUTION INTRAMUSCULAR; INTRAVENOUS; SUBCUTANEOUS
Status: DISCONTINUED | OUTPATIENT
Start: 2023-06-09 | End: 2023-06-09 | Stop reason: HOSPADM

## 2023-06-09 RX ORDER — ROCURONIUM BROMIDE 10 MG/ML
INJECTION, SOLUTION INTRAVENOUS PRN
Status: DISCONTINUED | OUTPATIENT
Start: 2023-06-09 | End: 2023-06-09 | Stop reason: SURG

## 2023-06-09 RX ORDER — ONDANSETRON 2 MG/ML
INJECTION INTRAMUSCULAR; INTRAVENOUS PRN
Status: DISCONTINUED | OUTPATIENT
Start: 2023-06-09 | End: 2023-06-09 | Stop reason: SURG

## 2023-06-09 RX ORDER — DEXAMETHASONE SODIUM PHOSPHATE 4 MG/ML
INJECTION, SOLUTION INTRA-ARTICULAR; INTRALESIONAL; INTRAMUSCULAR; INTRAVENOUS; SOFT TISSUE PRN
Status: DISCONTINUED | OUTPATIENT
Start: 2023-06-09 | End: 2023-06-09 | Stop reason: SURG

## 2023-06-09 RX ORDER — ACETAMINOPHEN 500 MG
1000 TABLET ORAL ONCE
Status: COMPLETED | OUTPATIENT
Start: 2023-06-09 | End: 2023-06-09

## 2023-06-09 RX ORDER — DIPHENHYDRAMINE HYDROCHLORIDE 50 MG/ML
12.5 INJECTION INTRAMUSCULAR; INTRAVENOUS
Status: DISCONTINUED | OUTPATIENT
Start: 2023-06-09 | End: 2023-06-09 | Stop reason: HOSPADM

## 2023-06-09 RX ORDER — CEFAZOLIN SODIUM 1 G/3ML
INJECTION, POWDER, FOR SOLUTION INTRAMUSCULAR; INTRAVENOUS PRN
Status: DISCONTINUED | OUTPATIENT
Start: 2023-06-09 | End: 2023-06-09 | Stop reason: SURG

## 2023-06-09 RX ORDER — OXYCODONE HCL 10 MG/1
10 TABLET, FILM COATED, EXTENDED RELEASE ORAL ONCE
Status: COMPLETED | OUTPATIENT
Start: 2023-06-09 | End: 2023-06-09

## 2023-06-09 RX ADMIN — HYDROMORPHONE HYDROCHLORIDE 0.5 MG: 2 INJECTION INTRAMUSCULAR; INTRAVENOUS; SUBCUTANEOUS at 08:41

## 2023-06-09 RX ADMIN — SUGAMMADEX 200 MG: 100 INJECTION, SOLUTION INTRAVENOUS at 09:41

## 2023-06-09 RX ADMIN — PROPOFOL 160 MG: 10 INJECTION, EMULSION INTRAVENOUS at 08:08

## 2023-06-09 RX ADMIN — ACETAMINOPHEN 1000 MG: 500 TABLET, FILM COATED ORAL at 07:24

## 2023-06-09 RX ADMIN — EPHEDRINE SULFATE 10 MG: 50 INJECTION, SOLUTION INTRAVENOUS at 08:22

## 2023-06-09 RX ADMIN — OXYCODONE HYDROCHLORIDE 10 MG: 10 TABLET, FILM COATED, EXTENDED RELEASE ORAL at 07:24

## 2023-06-09 RX ADMIN — SUCCINYLCHOLINE CHLORIDE 100 MG: 20 INJECTION, SOLUTION INTRAMUSCULAR; INTRAVENOUS at 08:08

## 2023-06-09 RX ADMIN — OXYCODONE HYDROCHLORIDE 10 MG: 5 SOLUTION ORAL at 10:02

## 2023-06-09 RX ADMIN — DEXAMETHASONE SODIUM PHOSPHATE 8 MG: 4 INJECTION INTRA-ARTICULAR; INTRALESIONAL; INTRAMUSCULAR; INTRAVENOUS; SOFT TISSUE at 08:20

## 2023-06-09 RX ADMIN — ROCURONIUM BROMIDE 50 MG: 50 INJECTION, SOLUTION INTRAVENOUS at 08:11

## 2023-06-09 RX ADMIN — SODIUM CHLORIDE, POTASSIUM CHLORIDE, SODIUM LACTATE AND CALCIUM CHLORIDE: 600; 310; 30; 20 INJECTION, SOLUTION INTRAVENOUS at 07:25

## 2023-06-09 RX ADMIN — EPHEDRINE SULFATE 10 MG: 50 INJECTION, SOLUTION INTRAVENOUS at 08:49

## 2023-06-09 RX ADMIN — ROCURONIUM BROMIDE 10 MG: 50 INJECTION, SOLUTION INTRAVENOUS at 09:01

## 2023-06-09 RX ADMIN — ONDANSETRON 4 MG: 2 INJECTION INTRAMUSCULAR; INTRAVENOUS at 09:29

## 2023-06-09 RX ADMIN — HYDROMORPHONE HYDROCHLORIDE 0.5 MG: 2 INJECTION INTRAMUSCULAR; INTRAVENOUS; SUBCUTANEOUS at 09:33

## 2023-06-09 RX ADMIN — METHOCARBAMOL 1000 MG: 100 INJECTION INTRAMUSCULAR; INTRAVENOUS at 10:05

## 2023-06-09 RX ADMIN — FENTANYL CITRATE 50 MCG: 50 INJECTION, SOLUTION INTRAMUSCULAR; INTRAVENOUS at 08:08

## 2023-06-09 RX ADMIN — PROPOFOL 20 MG: 10 INJECTION, EMULSION INTRAVENOUS at 09:41

## 2023-06-09 RX ADMIN — GABAPENTIN 300 MG: 300 CAPSULE ORAL at 07:24

## 2023-06-09 RX ADMIN — CEFAZOLIN 2 G: 1 INJECTION, POWDER, FOR SOLUTION INTRAMUSCULAR; INTRAVENOUS at 08:08

## 2023-06-09 RX ADMIN — FENTANYL CITRATE 50 MCG: 50 INJECTION, SOLUTION INTRAMUSCULAR; INTRAVENOUS at 08:20

## 2023-06-09 ASSESSMENT — PAIN DESCRIPTION - PAIN TYPE
TYPE: SURGICAL PAIN

## 2023-06-09 ASSESSMENT — FIBROSIS 4 INDEX: FIB4 SCORE: 1.33

## 2023-06-09 ASSESSMENT — PAIN SCALES - GENERAL: PAIN_LEVEL: 3

## 2023-06-09 NOTE — ANESTHESIA POSTPROCEDURE EVALUATION
Patient: Brian Haynes    Procedure Summary     Date: 06/09/23 Room / Location: [facility] OR 07 / SURGERY [facility]    Anesthesia Start: 0803 Anesthesia Stop: 0956    Procedure: POSTERIOR LUMBAR BILATERAL L4-5 MICRODISCECTOMY (Back) Diagnosis:       HNP (herniated nucleus pulposus), lumbar      (LUMBAR HNP)    Surgeons: Vel Nichole M.D. Responsible Provider: Romelia Richardson M.D.    Anesthesia Type: general ASA Status: 2          Final Anesthesia Type: general  Last vitals  BP   Blood Pressure : 123/76    Temp   36.5 °C (97.7 °F)    Pulse   80   Resp   18    SpO2   98 %      Anesthesia Post Evaluation    Patient location during evaluation: PACU  Patient participation: complete - patient participated  Level of consciousness: awake and alert  Pain score: 3    Airway patency: patent  Anesthetic complications: no  Cardiovascular status: hemodynamically stable  Respiratory status: acceptable and face mask  Hydration status: euvolemic    PONV: none          No notable events documented.     Nurse Pain Score: 8 (NPRS)

## 2023-06-09 NOTE — ANESTHESIA PROCEDURE NOTES
Airway    Date/Time: 6/9/2023 8:09 AM    Performed by: Romelia Richardson M.D.  Authorized by: Romelia Richardson M.D.    Location:  OR  Urgency:  Elective  Indications for Airway Management:  Anesthesia      Spontaneous Ventilation: absent    Sedation Level:  Deep  Preoxygenated: Yes    Patient Position:  Sniffing  Mask Difficulty Assessment:  1 - vent by mask  Final Airway Type:  Endotracheal airway  Final Endotracheal Airway:  ETT  Cuffed: Yes    Technique Used for Successful ETT Placement:  Direct laryngoscopy    Insertion Site:  Oral  Blade Type:  Glide  Laryngoscope Blade/Videolaryngoscope Blade Size:  3  ETT Size (mm):  7.5  Measured from:  Teeth  ETT to Teeth (cm):  24  Placement Verified by: auscultation and capnometry    Cormack-Lehane Classification:  Grade I - full view of glottis  Number of Attempts at Approach:  1  Number of Other Approaches Attempted:  1  Unsuccessful Airway(s) Attempted:  Oropharyngeal  Unsuccessful Approach(es) for ETT:  Direct laryngoscopy   Attempt x1 with sandhu blade - unable to get sandhu blade into mouth up to epiglottis.  Second attempt with glidescope - ETT seen passing through vocal cords.

## 2023-06-09 NOTE — OR NURSING
Pt is aaox4, resting comfortably in [facility] on room air. His respiraitons are equal and unlabored, he is in no acute distress. He is treated for pain per MAR with good response. He takes sips of juice and water without difficulty or complaints of n/v. Surgical site is clean, dry and intact with icepack in place. CMS intact. Neuro intact. Will continue to monitor.

## 2023-06-09 NOTE — ANESTHESIA PREPROCEDURE EVALUATION
Case: [id number] Date/Time: 06/09/23 0745    Procedure: POSTERIOR LUMBAR BILATERAL L4-5 MICRODISCECTOMY    Pre-op diagnosis: LUMBAR HNP    Location: [facility] OR 04 / SURGERY [facility]    Surgeons: Vel Nichole M.D.        66 yo man with chronic, intractable back pain.  - HTN  - reports having a small airway with associated difficult intubation during his prostatectomy. Had a lap hunter without intubation issues prior to that.   - on 5mg prednisone every morning and night which he takes with his prostate medication. Last dose was yesterday morning.     NPO>8hrs. No N/V.   Occasional EtOH. No tobacco or other recreational drugs.     Relevant Problems   CARDIAC   (positive) Hypertension   (positive) RBBB      Other   (positive) Secondary malignant neoplasm of lymph nodes (HCC)       Physical Exam    Airway   Mallampati: II  TM distance: <3 FB  Neck ROM: full       Cardiovascular - normal exam  Rhythm: regular  Rate: normal  (-) murmur     Dental - normal exam        Facial Hair   Pulmonary - normal exam  Breath sounds clear to auscultation     Abdominal    Neurological - normal exam         Other findings: No loose teeth.   Small chin.            Anesthesia Plan    ASA 2       Plan - general       Airway plan will be ETT          Induction: intravenous    Postoperative Plan: Postoperative administration of opioids is intended.    Pertinent diagnostic labs and testing reviewed    Informed Consent:    Anesthetic plan and risks discussed with patient.    Use of blood products discussed with: patient whom consented to blood products.

## 2023-06-09 NOTE — DISCHARGE INSTRUCTIONS
HOME CARE INSTRUCTIONS    ACTIVITY: Rest and take it easy for the first 24 hours.  A responsible adult is recommended to remain with you during that time.  It is normal to feel sleepy.  We encourage you to not do anything that requires balance, judgment or coordination.    FOR 24 HOURS DO NOT:  Drive, operate machinery or run household appliances.  Drink beer or alcoholic beverages.  Make important decisions or sign legal documents.    SPECIAL INSTRUCTIONS:     Microdiskectomy, Care After  This sheet gives you information about how to care for yourself after your procedure. Your health care provider may also give you more specific instructions. If you have problems or questions, contact your health care provider.  What can I expect after the procedure?  After the procedure, it is common to have:  Pain and stiffness in your back.  Some redness, swelling, and pain around your incision.  Follow these instructions at home:  Medicines  Take over-the-counter and prescription medicines only as told by your health care provider.  Ask your health care provider if the medicine prescribed to you:  Requires you to avoid driving or using heavy machinery.  Can cause constipation. You may need to take these actions to prevent or treat constipation:  Drink enough fluid to keep your urine pale yellow.  Take over-the-counter or prescription medicines.  Eat foods that are high in fiber, such as beans, whole grains, and fresh fruits and vegetables.  Limit foods that are high in fat and processed sugars, such as fried or sweet foods.  Incision care    Follow instructions from your health care provider about how to take care of your incision. Make sure you:  Wash your hands with soap and water before and after you change your bandage (dressing). If soap and water are not available, use hand .  Change your dressing as told by your health care provider.  Leave stitches (sutures), skin glue, or adhesive strips in place. These skin  closures may need to stay in place for 2 weeks or longer. If adhesive strip edges start to loosen and curl up, you may trim the loose edges. Do not remove adhesive strips completely unless your health care provider tells you to do that.  Keep your incision clean and dry.  Check your incision area every day for signs of infection. Check for:  More redness, swelling, or pain.  Fluid or blood.  Warmth.  Pus or a bad smell.  Managing pain, stiffness, and swelling    If directed, put ice on the painful area.  Put ice in a plastic bag.  Place a towel between your skin and the bag.  Leave the ice on for 20 minutes, 2-3 times a day.  Activity  Rest as told by your health care provider.  Avoid sitting or lying down for a long time without moving. Get up to take short walks every 1-2 hours. This is important to improve blood flow and breathing. Ask for help if you feel weak or unsteady.  Return to your normal activities as told by your health care provider. Ask your health care provider what activities are safe for you.  Do not lift anything that is heavier than 10 lb (4.5 kg), or the limit that you are told, until your health care provider says that it is safe. Avoid lifting anything over your head.  Avoid twisting and bending at the waist until your health care provider approves.  Avoid pushing and pulling motions.  Ask your health care provider what kinds of exercise you can do to make your back stronger. Do exercises as told by your health care provider or physical therapist.  General instructions  Do not use any products that contain nicotine or tobacco, such as cigarettes, e-cigarettes, and chewing tobacco. These can delay healing. If you need help quitting, ask your health care provider.  Do not take baths, swim, or use a hot tub until your health care provider approves. Ask your health care provider if you may take showers. You may only be allowed to take sponge baths.  Wear compression stockings as told by your  health care provider. These stockings help to prevent blood clots and reduce swelling in your legs.  Keep all follow-up visits as told by your health care provider or physical therapist. This is important.  Contact a health care provider if:  You have pain that gets worse or does not get better with medicine.  Your legs or your feet become painful or swollen.  You have more redness, swelling, or pain at the site of your incision.  You have fluid, blood, pus, or a bad smell coming from your incision.  The site of your incision is warm to the touch.  You vomit or feel nauseous.  You have difficulty controlling urination or bowel movements.  You have a fever.  Get help right away if you:  Have severe pain.  Have trouble breathing.  Develop a cough.  Have chest pain.  These symptoms may represent a serious problem that is an emergency. Do not wait to see if the symptoms will go away. Get medical help right away. Call your local emergency services (911 in the [place] Do not drive yourself to the hospital.  Summary  After your procedure, it is common to have pain and stiffness in your back.  You may also have some redness, swelling, and pain around your incision.  Take over-the-counter and prescription medicines only as told by your health care provider.  Follow instructions from your health care provider about how to take care of your incision.  Keep all follow-up visits as told by your health care provider or physical therapist. This is important.  This information is not intended to replace advice given to you by your health care provider. Make sure you discuss any questions you have with your health care provider.        DIET: To avoid nausea, slowly advance diet as tolerated, avoiding spicy or greasy foods for the first day.  Add more substantial food to your diet according to your physician's instructions.  Babies can be fed formula or breast milk as soon as they are hungry.  INCREASE FLUIDS AND FIBER TO AVOID  CONSTIPATION.    SURGICAL DRESSING/BATHING: Ok to shower, no baths, hot tubs or swimming pools until ok with Dr. Nichole.    MEDICATIONS: Resume taking daily medication.  Take prescribed pain medication with food.  If no medication is prescribed, you may take non-aspirin pain medication if needed.  PAIN MEDICATION CAN BE VERY CONSTIPATING.  Take a stool softener or laxative such as senokot, pericolace, or milk of magnesia if needed.    Prescription given for Tylenol with Codeine.  Last pain medication given at 10:02 AM.    A follow-up appointment should be arranged with your doctor in 2 weeks; call to schedule.    You should CALL YOUR PHYSICIAN if you develop:  Fever greater than 101 degrees F.  Pain not relieved by medication, or persistent nausea or vomiting.  Excessive bleeding (blood soaking through dressing) or unexpected drainage from the wound.  Extreme redness or swelling around the incision site, drainage of pus or foul smelling drainage.  Inability to urinate or empty your bladder within 8 hours.  Problems with breathing or chest pain.    You should call 911 if you develop problems with breathing or chest pain.  If you are unable to contact your doctor or surgical center, you should go to the nearest emergency room or urgent care center.  Physician's telephone #: [phone]    MILD FLU-LIKE SYMPTOMS ARE NORMAL.  YOU MAY EXPERIENCE GENERALIZED MUSCLE ACHES, THROAT IRRITATION, HEADACHE AND/OR SOME NAUSEA.    If any questions arise, call your doctor.  If your doctor is not available, please feel free to call the Surgical Center at [phone].  The Center is open Monday through Friday from 7AM to 7PM.      A registered nurse may call you a few days after your surgery to see how you are doing after your procedure.    You may also receive a survey in the mail within the next two weeks and we ask that you take a few moments to complete the survey and return it to us.  Our goal is to provide you with very  good care and we value your comments.     Depression / Suicide Risk    As you are discharged from this [facility] facility, it is important to learn how to keep safe from harming yourself.    Recognize the warning signs:  Abrupt changes in personality, positive or negative- including increase in energy   Giving away possessions  Change in eating patterns- significant weight changes-  positive or negative  Change in sleeping patterns- unable to sleep or sleeping all the time   Unwillingness or inability to communicate  Depression  Unusual sadness, discouragement and loneliness  Talk of wanting to die  Neglect of personal appearance   Rebelliousness- reckless behavior  Withdrawal from people/activities they love  Confusion- inability to concentrate     If you or a loved one observes any of these behaviors or has concerns about self-harm, here's what you can do:  Talk about it- your feelings and reasons for harming yourself  Remove any means that you might use to hurt yourself (examples: pills, rope, extension cords, firearm)  Get professional help from the community (Mental Health, Substance Abuse, psychological counseling)  Do not be alone:Call your Safe Contact- someone whom you trust who will be there for you.  Call your local CRISIS HOTLINE 151-2832 or 847-337-8113  Call your local Children's Mobile Crisis Response Team [place] (991) 289-2151 or www.[place]  Call the toll free National Suicide Prevention Hotlines   National Suicide Prevention Lifeline 239-916-FFRD (4922)  National Hope Line Network 800-SUICIDE (415-1571)    I acknowledge receipt and understanding of these Home Care instructions.

## 2023-06-09 NOTE — OR SURGEON
ORTHO SPINE Immediate Post OP Note  [facility] [place]    PreOp Diagnosis:     Large left-sided L4-L5 herniated nucleus pulposus extrusion  Intractable left lower extremity L5 and S1 radiculopathy  L4-L5 facet arthropathy  Chronic L4 anterior inferior lytic lesion consistent with prostate cancer  L4-L5 degenerative disc disease  L4-L5 spinal canal stenosis moderate  Left ankle dorsiflexion weakness  Left extensor houses longus weakness  Left slight plantarflexion weakness      PostOp Diagnosis:     Large left-sided L4-L5 herniated nucleus pulposus extrusion  2.   Intractable left lower extremity L5 and S1 radiculopathy  3.   L4-L5 facet arthropathy  4.   Chronic L4 anterior inferior lytic lesion consistent with prostate cancer  5.   L4-L5 degenerative disc disease  6.   L4-L5 spinal canal stenosis moderate  7.   Left ankle dorsiflexion weakness  8.   Left extensor houses longus weakness  9.   Left slight plantarflexion weakness      Procedure(s):     POSTERIOR LUMBAR LEFT  L4-5 MICRODISCECTOMY - Wound Class: Clean with Drain   BILATERAL L4 LAMINOTOMIES    Surgeon(s):  Vel Nichole M.D.    Anesthesiologist/Type of Anesthesia:  Anesthesiologist: Romelia Richardson M.D./General    Surgical Staff:  Assistant: GIFTY Kraft  Circulator: Emelina Rothman R.N.; Chanel Newell R.N.  Relief Circulator: Jason Lyons R.N.  Scrub Person: Susy Mathew  Radiology Technologist: Marie Gutierrez    Specimens removed if any:  * No specimens in log *    Estimated Blood Loss: 10 cc    Findings: LARGE LEFT DISC HERNIATION L4-L5    Complications: NONE        6/9/2023 9:37 AM Vel Nichole M.D.

## 2023-06-09 NOTE — ANESTHESIA TIME REPORT
Anesthesia Start and Stop Event Times     Date Time Event    6/9/2023 0755 Ready for Procedure     0803 Anesthesia Start     0956 Anesthesia Stop        Responsible Staff  06/09/23    Name Role Begin End    Romelia Richardson M.D. Anesth 0803 0956        Overtime Reason:  no overtime (within assigned shift)    Comments:

## 2023-06-10 LAB — 1,25(OH)2D3 SERPL-MCNC: 45.2 PG/ML (ref 19.9–79.3)

## 2024-03-29 ENCOUNTER — OFFICE VISIT (OUTPATIENT)
Dept: URGENT CARE | Facility: CLINIC | Age: 69
End: 2024-03-29
Payer: COMMERCIAL

## 2024-03-29 VITALS
BODY MASS INDEX: 29.25 KG/M2 | DIASTOLIC BLOOD PRESSURE: 78 MMHG | TEMPERATURE: 96.5 F | OXYGEN SATURATION: 97 % | HEART RATE: 70 BPM | HEIGHT: 70 IN | SYSTOLIC BLOOD PRESSURE: 118 MMHG | RESPIRATION RATE: 16 BRPM | WEIGHT: 204.3 LBS

## 2024-03-29 DIAGNOSIS — J06.9 VIRAL URI WITH COUGH: ICD-10-CM

## 2024-03-29 RX ORDER — DEXTROMETHORPHAN HYDROBROMIDE AND PROMETHAZINE HYDROCHLORIDE 15; 6.25 MG/5ML; MG/5ML
5 SYRUP ORAL EVERY 6 HOURS PRN
Qty: 120 ML | Refills: 0 | Status: SHIPPED | OUTPATIENT
Start: 2024-03-29 | End: 2024-04-05

## 2024-03-29 ASSESSMENT — FIBROSIS 4 INDEX: FIB4 SCORE: 1.13

## 2024-03-29 ASSESSMENT — ENCOUNTER SYMPTOMS
VOMITING: 0
SORE THROAT: 0
NAUSEA: 0
COUGH: 1
SHORTNESS OF BREATH: 1
SPUTUM PRODUCTION: 0
PALPITATIONS: 0
FEVER: 0
DIARRHEA: 1
WHEEZING: 1
HEMOPTYSIS: 0

## 2024-03-29 NOTE — PROGRESS NOTES
Subjective:     Brian Haynes is a 68 y.o. male who presents for Cough (X3-4 days, SOB when laying down, wheezing )      Symptoms started on Monday. States he's had SOB and wheezing that is worse with laying down. Is not currently SOB.  Diarrhea a few days ago now resolved. Had noticed an intermittent elevation in his HR, noting it was in the 90's on his watch. Has taken Nyquil. His son had tested positive for strep exposure.    Cough  This is a new problem. The current episode started in the past 7 days. Associated symptoms include shortness of breath and wheezing. Pertinent negatives include no chest pain, ear pain, fever, hemoptysis, postnasal drip or sore throat. There is no history of asthma.       Past Medical History:   Diagnosis Date    Anesthesia     Pt reports small airway and difficulty intubating.    Arthritis     Osteo to knee and neck and fingers    Cancer (HCC) 10/2013    Prostate-prostatectomy and radiation x2(recently cyber knife 8/2018)    Dermatitis     Disorder of thyroid     Hashimotos (last blood test said no)-no current meds    Hayfever     Heart burn     Treated with pepcid.    High cholesterol     Hypertension     Indigestion     Treated with pepcid.    Migraines     3-4 per year    Right knee pain 12/05/2019       Past Surgical History:   Procedure Laterality Date    PB LAMINOTOMY,LUMBAR DISK,1 INTRSP N/A 6/9/2023    Procedure: POSTERIOR LUMBAR BILATERAL L4-5 MICRODISCECTOMY;  Surgeon: Vel Nichole M.D.;  Location: SURGERY [facility];  Service: Neurosurgery    PB PLASTY KNEE,MED OR LAT COMPARTMT Right 12/9/2019    Procedure: ARTHROPLASTY, KNEE, UNICOMPARTMENTAL - VERSUS;  Surgeon: Olman Baez M.D.;  Location: [facility];  Service: Orthopedics    KNEE ARTHROSCOPY Right 9/13/2018    Procedure: KNEE ARTHROSCOPY;  Surgeon: Olman Baez M.D.;  Location: [facility];  Service: Orthopedics    MENISCECTOMY, KNEE, MEDIAL Right 9/13/2018     Procedure: MEDIAL MENISCECTOMY - PARTIAL, KEITH;  Surgeon: Olman Baez M.D.;  Location: SURGERY [facility];  Service: Orthopedics    BLADDER NECK CONTRACTURE EXICISION  07/2018    INGUINAL HERNIA REPAIR Left 4/13/2017    Procedure: INGUINAL HERNIA REPAIR- OPEN;  Surgeon: Selvin Peña M.D.;  Location: SURGERY [facility];  Service:     HERNIA REPAIR, INCISIONAL, UNILATERAL Left 11/2014    and by hunter scar    COLONOSCOPY  2014    PROSTATECTOMY, RADIAL  10/2013    robotic for cancer    HUNTER BY LAPAROSCOPY  04/2010    SEPTOPLASTY  1997    DENTAL EXTRACTION(S)  1971    TONSILLECTOMY  1959       Social History     Socioeconomic History    Marital status:      Spouse name: Not on file    Number of children: Not on file    Years of education: Not on file    Highest education level: Not on file   Occupational History    Not on file   Tobacco Use    Smoking status: Never    Smokeless tobacco: Never   Vaping Use    Vaping Use: Never used   Substance and Sexual Activity    Alcohol use: Yes     Comment: 2 per week    Drug use: Yes     Types: Oral     Comment: gummies  once every 3 months for sleep - last used 3/2023    Sexual activity: Yes     Partners: Female   Other Topics Concern    Not on file   Social History Narrative    Not on file     Social Determinants of Health     Financial Resource Strain: Not on file   Food Insecurity: Not on file   Transportation Needs: Not on file   Physical Activity: Not on file   Stress: Not on file   Social Connections: Not on file   Intimate Partner Violence: Not on file   Housing Stability: Not on file        Family History   Problem Relation Age of Onset    Hypertension Mother     Lung Disease Father     Cancer Father     Prostate cancer Father     Thyroid Father     Hypertension Father     Allergies Father     Arthritis Father         No Known Allergies    Review of Systems   Constitutional:  Positive for malaise/fatigue. Negative for fever.   HENT:  Negative  "for ear pain, postnasal drip and sore throat.    Respiratory:  Positive for cough, shortness of breath and wheezing. Negative for hemoptysis and sputum production.    Cardiovascular:  Negative for chest pain, palpitations and leg swelling.   Gastrointestinal:  Positive for diarrhea. Negative for nausea and vomiting.   All other systems reviewed and are negative.       Objective:   /78   Pulse 70   Temp 35.8 °C (96.5 °F) (Temporal)   Resp 16   Ht 1.778 m (5' 10\")   Wt 92.7 kg (204 lb 4.8 oz)   SpO2 97%   BMI 29.31 kg/m²     Physical Exam  Vitals reviewed.   Constitutional:       General: He is not in acute distress.     Appearance: He is well-developed.   HENT:      Head: Normocephalic and atraumatic.      Right Ear: External ear normal. No drainage, swelling or tenderness. Tympanic membrane is not erythematous.      Left Ear: External ear normal. No drainage, swelling or tenderness. Tympanic membrane is not erythematous.      Nose: Mucosal edema present.      Mouth/Throat:      Lips: Pink.      Mouth: Mucous membranes are moist.      Pharynx: Posterior oropharyngeal erythema present. No oropharyngeal exudate.   Eyes:      Conjunctiva/sclera: Conjunctivae normal.   Cardiovascular:      Rate and Rhythm: Normal rate.   Pulmonary:      Effort: Pulmonary effort is normal. No respiratory distress.      Breath sounds: Normal breath sounds. No stridor. No wheezing, rhonchi or rales.      Comments: Cough noted.   Musculoskeletal:      Cervical back: Neck supple.   Skin:     General: Skin is warm and dry.      Findings: No rash.   Neurological:      Mental Status: He is alert and oriented to person, place, and time.      GCS: GCS eye subscore is 4. GCS verbal subscore is 5. GCS motor subscore is 6.   Psychiatric:         Speech: Speech normal.         Behavior: Behavior normal.         Thought Content: Thought content normal.         Judgment: Judgment normal.         Assessment/Plan:   1. Viral URI with cough  - " promethazine-dextromethorphan (PROMETHAZINE-DM) 6.25-15 MG/5ML syrup; Take 5 mL by mouth every 6 hours as needed for Cough for up to 7 days.  Dispense: 120 mL; Refill: 0  Symptomatic care.  -Oral hydration and rest.   -Cough control: nonpharmacologic options for cough relief such as throat lozenges, hot tea, honey.  -Over the counter expectorant as directed; Guaifenesin (Mucinex).  -Tylenol or ibuprofen for pain and fever as directed.   -Warm salt water gargles.  -OTC Throat lozenges or spray (Cepacol).    Seek emergency medical care immediately for: Trouble breathing, persistent pain or pressure in the chest, confusion, inability to wake or stay awake, bluish lips or face, persistent tachycardia (fast heart rate), prolonged dizziness, persistent high grade fevers. Follow up for prolonged cough, persistent wheezing, persistent throat pain, difficulty swallowing, persistent fevers, leg swelling, or any other concerns. Follow up with your Primary Care Provider.     -Presents with acute URI symptoms x 5 days. Stable Vitals. Clear, non-labored breath sounds. No wheeze currently noted. Patient denies having pharyngitis. Discussed viral etiology, symptomatic care, and S&S of PNA with follow up.  He has albuterol at home if needed for wheezing. Declined viral testing.     Differential diagnosis, natural history, supportive care, and indications for immediate follow-up discussed.

## 2024-10-19 ENCOUNTER — OFFICE VISIT (OUTPATIENT)
Dept: URGENT CARE | Facility: CLINIC | Age: 69
End: 2024-10-19
Payer: COMMERCIAL

## 2024-10-19 VITALS
HEIGHT: 70 IN | BODY MASS INDEX: 27.92 KG/M2 | WEIGHT: 195 LBS | HEART RATE: 86 BPM | RESPIRATION RATE: 18 BRPM | OXYGEN SATURATION: 96 % | DIASTOLIC BLOOD PRESSURE: 64 MMHG | SYSTOLIC BLOOD PRESSURE: 110 MMHG | TEMPERATURE: 97.3 F

## 2024-10-19 DIAGNOSIS — B96.89 ACUTE BACTERIAL SINUSITIS: ICD-10-CM

## 2024-10-19 DIAGNOSIS — J01.90 ACUTE BACTERIAL SINUSITIS: ICD-10-CM

## 2024-10-19 PROCEDURE — 3078F DIAST BP <80 MM HG: CPT | Performed by: NURSE PRACTITIONER

## 2024-10-19 PROCEDURE — 99213 OFFICE O/P EST LOW 20 MIN: CPT | Performed by: NURSE PRACTITIONER

## 2024-10-19 PROCEDURE — 3074F SYST BP LT 130 MM HG: CPT | Performed by: NURSE PRACTITIONER

## 2024-10-19 RX ORDER — RELUGOLIX 120 MG/1
TABLET, FILM COATED ORAL
COMMUNITY
End: 2024-10-19 | Stop reason: CLARIF

## 2024-10-19 RX ORDER — UBROGEPANT 50 MG/1
TABLET ORAL
COMMUNITY

## 2024-10-19 RX ORDER — HYDROCHLOROTHIAZIDE 12.5 MG/1
CAPSULE ORAL
COMMUNITY
Start: 2024-10-16

## 2024-10-19 RX ORDER — TRIAZOLAM 0.25 MG
TABLET ORAL
COMMUNITY

## 2024-10-19 RX ORDER — PREDNISONE 50 MG/1
TABLET ORAL
COMMUNITY

## 2024-10-19 ASSESSMENT — ENCOUNTER SYMPTOMS
SINUS PRESSURE: 1
COUGH: 1
SINUS PAIN: 1
HEADACHES: 1
CHILLS: 0

## 2024-10-19 ASSESSMENT — FIBROSIS 4 INDEX: FIB4 SCORE: 1.15

## 2024-11-09 ENCOUNTER — OFFICE VISIT (OUTPATIENT)
Dept: URGENT CARE | Facility: CLINIC | Age: 69
End: 2024-11-09
Payer: COMMERCIAL

## 2024-11-09 VITALS
HEART RATE: 61 BPM | RESPIRATION RATE: 16 BRPM | BODY MASS INDEX: 28.63 KG/M2 | DIASTOLIC BLOOD PRESSURE: 66 MMHG | HEIGHT: 70 IN | TEMPERATURE: 97.6 F | OXYGEN SATURATION: 96 % | SYSTOLIC BLOOD PRESSURE: 102 MMHG | WEIGHT: 200 LBS

## 2024-11-09 DIAGNOSIS — R31.21 ASYMPTOMATIC MICROSCOPIC HEMATURIA: ICD-10-CM

## 2024-11-09 DIAGNOSIS — R42 DIZZINESS: ICD-10-CM

## 2024-11-09 DIAGNOSIS — H81.10 BENIGN PAROXYSMAL POSITIONAL VERTIGO, UNSPECIFIED LATERALITY: ICD-10-CM

## 2024-11-09 DIAGNOSIS — R11.0 NAUSEA: ICD-10-CM

## 2024-11-09 LAB
APPEARANCE UR: CLEAR
BILIRUB UR STRIP-MCNC: NEGATIVE MG/DL
COLOR UR AUTO: YELLOW
GLUCOSE BLD-MCNC: 139 MG/DL (ref 65–99)
GLUCOSE UR STRIP.AUTO-MCNC: NEGATIVE MG/DL
KETONES UR STRIP.AUTO-MCNC: NEGATIVE MG/DL
LEUKOCYTE ESTERASE UR QL STRIP.AUTO: NEGATIVE
NITRITE UR QL STRIP.AUTO: NEGATIVE
PH UR STRIP.AUTO: 6 [PH] (ref 5–8)
PROT UR QL STRIP: NEGATIVE MG/DL
RBC UR QL AUTO: NORMAL
SP GR UR STRIP.AUTO: 1.02
UROBILINOGEN UR STRIP-MCNC: 0.2 MG/DL

## 2024-11-09 PROCEDURE — 3078F DIAST BP <80 MM HG: CPT

## 2024-11-09 PROCEDURE — 82962 GLUCOSE BLOOD TEST: CPT

## 2024-11-09 PROCEDURE — 3074F SYST BP LT 130 MM HG: CPT

## 2024-11-09 PROCEDURE — 99214 OFFICE O/P EST MOD 30 MIN: CPT

## 2024-11-09 PROCEDURE — 81002 URINALYSIS NONAUTO W/O SCOPE: CPT

## 2024-11-09 RX ORDER — ONDANSETRON 4 MG/1
4 TABLET, ORALLY DISINTEGRATING ORAL EVERY 6 HOURS PRN
Qty: 15 TABLET | Refills: 0 | Status: SHIPPED | OUTPATIENT
Start: 2024-11-09

## 2024-11-09 ASSESSMENT — ENCOUNTER SYMPTOMS
SPUTUM PRODUCTION: 0
EYE DISCHARGE: 0
DIAPHORESIS: 0
PSYCHIATRIC NEGATIVE: 1
DIZZINESS: 1
MYALGIAS: 0
BLURRED VISION: 0
FEVER: 0
DIARRHEA: 0
WEAKNESS: 0
ABDOMINAL PAIN: 0
SORE THROAT: 0
HEADACHES: 0
BLOOD IN STOOL: 0
SHORTNESS OF BREATH: 0
NAUSEA: 1
SINUS PAIN: 0
CHILLS: 0
WHEEZING: 0
COUGH: 0
VOMITING: 0

## 2024-11-09 ASSESSMENT — FIBROSIS 4 INDEX: FIB4 SCORE: 1.15

## 2024-11-09 ASSESSMENT — VISUAL ACUITY: OU: 1

## 2024-11-09 NOTE — PROGRESS NOTES
Subjective:   Brian Haynes is a 69 y.o. male who presents for Dizziness (Only in the mornings x2 mornings)      HPI  Patient complains of dizziness with bending forward and position changes in the morning for the past two days. Patient states it feels like the room is spinning.     He currently has no dizziness or nausea    Negative: diplopia, dysarthria, dysphagia, dysphonia, abnormal gait, chest pain, palpitations, shortness of breath, vomiting, abdominal pain, back pain, headache, neck pain fever, dizziness with exertion, palpitations, syncope, hearing loss, tinnitus, ear pain, ear fullness, hx of AAA,       Review of Systems   Constitutional:  Negative for chills, diaphoresis, fever and malaise/fatigue.   HENT:  Negative for congestion, ear pain, sinus pain and sore throat.    Eyes:  Negative for blurred vision and discharge.   Respiratory:  Negative for cough, sputum production, shortness of breath and wheezing.    Cardiovascular:  Negative for chest pain.   Gastrointestinal:  Positive for nausea. Negative for abdominal pain, blood in stool, diarrhea and vomiting.   Genitourinary: Negative.    Musculoskeletal:  Negative for myalgias.   Skin:  Negative for rash.   Neurological:  Positive for dizziness. Negative for weakness and headaches.   Endo/Heme/Allergies: Negative.    Psychiatric/Behavioral: Negative.     All other systems reviewed and are negative.      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 15 Tablet 0    hydrochlorothiazide (MICROZIDE) 12.5 MG capsule       predniSONE (DELTASONE) 50 MG Tab TAKE ONE TABLET BY MOUTH ONE TIME DAILY for 5 days      UBRELVY 50 MG Tab TAKE ONE TABLET BY MOUTH DAILY AS NEEDED for headaches      leuprolide (LUPRON) 22.5 MG Kit Inject 22.5 mg into the shoulder, thigh, or buttocks one time.      Abiraterone Acetate 250 MG Tab Take 1,000 mg by mouth every day.      sumatriptan (IMITREX) 100 MG tablet Take 100 mg by mouth as needed for Migraine.      famotidine (PEPCID) 20 MG Tab Take 20 mg by mouth every bedtime.      enalapril (VASOTEC) 10 MG TABS TAKE 1 TAB BY MOUTH 2 TIMES A DAY. 30 Each 0    triazolam (HALCION) 0.25 MG Tab  (Patient not taking: Reported on 11/9/2024)       No current [facility]-ordered facility-administered medications on file.       Past Surgical History:   Procedure Laterality Date    PB LAMINOTOMY,LUMBAR DISK,1 INTRSP N/A 6/9/2023    Procedure: POSTERIOR LUMBAR BILATERAL L4-5 MICRODISCECTOMY;  Surgeon: Vel Nichole M.D.;  Location: [facility];  Service: Neurosurgery    PB PLASTY KNEE,MED OR LAT COMPARTMT Right 12/9/2019    Procedure: ARTHROPLASTY, KNEE, UNICOMPARTMENTAL - VERSUS;  Surgeon: Olman Baez M.D.;  Location: [facility];  Service: Orthopedics    KNEE ARTHROSCOPY Right 9/13/2018    Procedure: KNEE ARTHROSCOPY;  Surgeon: Olman Baez M.D.;  Location: [facility];  Service: Orthopedics    MENISCECTOMY, KNEE, MEDIAL Right 9/13/2018    Procedure: MEDIAL MENISCECTOMY - PARTIAL, KEITH;  Surgeon: Olman Baez M.D.;  Location: [facility];  Service: Orthopedics    BLADDER NECK CONTRACTURE EXICISION  07/2018    INGUINAL HERNIA  "REPAIR Left 4/13/2017    Procedure: INGUINAL HERNIA REPAIR- OPEN;  Surgeon: Selvin Peña M.D.;  Location: [facility];  Service:     HERNIA REPAIR, INCISIONAL, UNILATERAL Left 11/2014    and by [facility]    COLONOSCOPY  2014    PROSTATECTOMY, RADIAL  10/2013    robotic for cancer    HUNTER BY LAPAROSCOPY  04/2010    SEPTOPLASTY  1997    DENTAL EXTRACTION(S)  1971    TONSILLECTOMY  1959       Social History     Tobacco Use    Smoking status: Never    Smokeless tobacco: Never   Vaping Use    Vaping status: Never Used   Substance Use Topics    Alcohol use: Not Currently     Comment: 2 per week    Drug use: Yes     Types: Oral     Comment: gummies  once every 3 months for sleep - last used 3/2023       family history includes Allergies in his father; Arthritis in his father; Cancer in his father; Hypertension in his father and mother; Lung Disease in his father; Prostate cancer in his father; Thyroid in his father.     Problem list, medications, and allergies reviewed by myself today in Epic.     Objective:   /66 (BP Location: Left arm, Patient Position: Sitting, BP Cuff Size: Adult)   Pulse 61   Temp 36.4 °C (97.6 °F) (Temporal)   Resp 16   Ht 1.778 m (5' 10\")   Wt 90.7 kg (200 lb)   SpO2 96%   BMI 28.70 kg/m²     Physical Exam  Vitals reviewed.   Constitutional:       General: He is not in acute distress.     Appearance: Normal appearance. He is not ill-appearing, toxic-appearing or diaphoretic.   HENT:      Head: Normocephalic.      Jaw: There is normal jaw occlusion.      Right Ear: Tympanic membrane, ear canal and external ear normal.      Left Ear: Tympanic membrane, ear canal and external ear normal.      Nose: Nose normal. No congestion or rhinorrhea.      Right Sinus: No maxillary sinus tenderness or frontal sinus tenderness.      Left Sinus: No maxillary sinus tenderness or frontal sinus tenderness.      Mouth/Throat:      Lips: Pink.      Mouth: Mucous membranes are moist.      " Tongue: Tongue does not deviate from midline.      Pharynx: Oropharynx is clear. Uvula midline. No oropharyngeal exudate, posterior oropharyngeal erythema or uvula swelling.   Eyes:      General: Lids are normal. Vision grossly intact.      Extraocular Movements: Extraocular movements intact.      Right eye: Nystagmus present.      Left eye: Nystagmus present.      Conjunctiva/sclera: Conjunctivae normal.      Pupils: Pupils are equal, round, and reactive to light.   Cardiovascular:      Rate and Rhythm: Normal rate and regular rhythm.      Pulses: Normal pulses.      Heart sounds: Murmur heard.   Pulmonary:      Effort: Pulmonary effort is normal.      Breath sounds: Normal breath sounds.   Abdominal:      General: Abdomen is flat.      Palpations: Abdomen is soft.      Tenderness: There is no abdominal tenderness.   Musculoskeletal:         General: Normal range of motion.      Cervical back: Normal range of motion and neck supple.   Lymphadenopathy:      Cervical: No cervical adenopathy.   Skin:     General: Skin is warm.      Capillary Refill: Capillary refill takes less than 2 seconds.   Neurological:      General: No focal deficit present.      Mental Status: He is alert and oriented to person, place, and time.   Psychiatric:         Mood and Affect: Mood normal.         Behavior: Behavior normal.         Assessment/Plan:     Diagnosis and associated orders:     1. Benign paroxysmal positional vertigo, unspecified laterality    2. Dizziness  - EKG - Clinic Performed  - POCT glucose  - POCT Urinalysis  - ondansetron (ZOFRAN ODT) 4 MG TABLET DISPERSIBLE; Take 1 Tablet by mouth every 6 hours as needed for Nausea/Vomiting for up to 15 doses.  Dispense: 15 Tablet; Refill: 0    3. Nausea  - ondansetron (ZOFRAN ODT) 4 MG TABLET DISPERSIBLE; Take 1 Tablet by mouth every 6 hours as needed for Nausea/Vomiting for up to 15 doses.  Dispense: 15 Tablet; Refill: 0    4. Asymptomatic microscopic hematuria     Comments/MDM:    I personally reviewed prior external notes and test results pertinent to today's visit as well as additional imaging and testing completed in clinic today.    1. Benign paroxysmal positional vertigo, unspecified laterality        2. Dizziness  EKG - Clinic Performed    POCT glucose    POCT Urinalysis    ondansetron (ZOFRAN ODT) 4 MG TABLET DISPERSIBLE      3. Nausea  ondansetron (ZOFRAN ODT) 4 MG TABLET DISPERSIBLE      4. Asymptomatic microscopic hematuria            Very pleasant 69-year-old afebrile, hemodynamically stable, well-appearing male presenting with complaints of morning dizziness exacerbated by specific head movements and position changes.  Patient has noted that the past 2 times he has been to the urgent care his blood pressure has been low.  He does not check his blood pressure at home.  He does take 2 hypertension medications.  Murmur noted on cardiovascular exam.  Normal pulmonary exam.  Normal ENT exam outside of nystagmus.   In clinic UA was positive for blood. Instructed to follow up with PCP if he develops any symptoms and for repeat UA.   Independent EKG interpretation shows normal sinus rhythm at a rate of 63.  No ST changes noted. Patient instructed to follow up with PCP about possible changes to EKG.  In clinic fingerstick glucose was 139  Patient most likely suffering form benign paroxysmal positional vertigo.  Extensive discussion on the diagnosis, treatment, how to perform Epley's maneuver, using Zofran as needed to perform Epley's maneuver.  Patient instructed to return to the clinic if symptoms fail to improve in 4 days with Epley's maneuver.  Patient instructed to go to the emergency room if symptoms worsen.  Patient verbalized understanding.       Differential diagnosis considered including Vestibular migraine, vestibular paroxysmia, postural hypotension, ototoxicity, acoustic neuroma, ACS, arrhythmia, CVA, AAA, sepsis, anemia, SAH  .  These were thought to be less likely given the  history, exam, and workup. No red flag warnings noted.  Patient given strict instructions to follow up with emergency room if they develop any red flag warnings which were discussed in depth.  They verbalized understanding. Medical decision making completed using the shared decision making model treatment plan decided together with patient.      Please note that this dictation was created using voice recognition software. I have made every reasonable attempt to correct obvious errors, but I expect that there are errors of grammar and possibly content that I did not discover before finalizing the note.

## 2024-11-09 NOTE — PATIENT INSTRUCTIONS
Education:  Benign paroxysmal positional vertigo is often caused by calcium debris within the semicircular canal. The debris causes inappropriate movement of the fluid in the ears which leads to the feeling of spinning or dizziness.     Recurrences of vertigo are very common. Vitamin D supplementation has been shown to reduce the recurrence rate by half.     Return for treatment:  If your symptoms do not improve in 4 days with Epley's Maneuver   You develop dizziness with exertion, shortness of breath, fever, headache/neck pain, or palpitations

## 2024-11-20 NOTE — CARE PLAN
Problem: Safety  Goal: Will remain free from injury  Outcome: PROGRESSING AS EXPECTED     Problem: Venous Thromboembolism (VTW)/Deep Vein Thrombosis (DVT) Prevention:  Goal: Patient will participate in Venous Thrombosis (VTE)/Deep Vein Thrombosis (DVT)Prevention Measures  Outcome: PROGRESSING AS EXPECTED     Problem: Bowel/Gastric:  Goal: Normal bowel function is maintained or improved  Outcome: PROGRESSING AS EXPECTED     
  Problem: Safety  Goal: Will remain free from injury  Outcome: PROGRESSING AS EXPECTED  Goal: Will remain free from falls  Outcome: PROGRESSING AS EXPECTED     Problem: Infection  Goal: Will remain free from infection  Outcome: PROGRESSING AS EXPECTED     Problem: Pain Management  Goal: Pain level will decrease to patient's comfort goal  Outcome: PROGRESSING AS EXPECTED     Problem: Fluid Volume:  Goal: Will maintain balanced intake and output  Outcome: PROGRESSING AS EXPECTED     Problem: Mobility  Goal: Risk for activity intolerance will decrease  Outcome: PROGRESSING AS EXPECTED     
Problem: Discharge Barriers/Planning  Goal: Patient's continuum of care needs will be met  Outcome: PROGRESSING AS EXPECTED  In anticipation of discharge, patient has walked 50 feet before midnight and has urinated with no trouble. Patient tolerating fluids and solids with no N/V thus far. Dressing CDI. VSS at this time. Patient on RA with saturations in the mid 90's. No pain reported at this time.     Problem: Communication  Goal: The ability to communicate needs accurately and effectively will improve  Outcome: PROGRESSING AS EXPECTED   Plan of care reviewed with the patient in regards to medication usage and PT/OT tomorrow before discharge. Patient verbalizes understanding. Patient A&ox4 and able to use call light effectively when needs arise.   
Yes

## 2024-12-23 ENCOUNTER — OFFICE VISIT (OUTPATIENT)
Dept: URGENT CARE | Facility: CLINIC | Age: 69
End: 2024-12-23
Payer: COMMERCIAL

## 2024-12-23 VITALS
HEIGHT: 70 IN | OXYGEN SATURATION: 98 % | DIASTOLIC BLOOD PRESSURE: 66 MMHG | SYSTOLIC BLOOD PRESSURE: 128 MMHG | RESPIRATION RATE: 16 BRPM | TEMPERATURE: 98.4 F | HEART RATE: 87 BPM | WEIGHT: 195 LBS | BODY MASS INDEX: 27.92 KG/M2

## 2024-12-23 DIAGNOSIS — J01.40 ACUTE NON-RECURRENT PANSINUSITIS: ICD-10-CM

## 2024-12-23 DIAGNOSIS — R01.1 MURMUR: ICD-10-CM

## 2024-12-23 PROCEDURE — 3078F DIAST BP <80 MM HG: CPT | Performed by: NURSE PRACTITIONER

## 2024-12-23 PROCEDURE — 99214 OFFICE O/P EST MOD 30 MIN: CPT | Performed by: NURSE PRACTITIONER

## 2024-12-23 PROCEDURE — 3074F SYST BP LT 130 MM HG: CPT | Performed by: NURSE PRACTITIONER

## 2024-12-23 RX ORDER — METOCLOPRAMIDE 10 MG/1
TABLET ORAL
COMMUNITY

## 2024-12-23 RX ORDER — PROMETHAZINE HYDROCHLORIDE AND CODEINE PHOSPHATE 6.25; 1 MG/5ML; MG/5ML
SYRUP ORAL
COMMUNITY
End: 2024-12-23

## 2024-12-23 RX ORDER — DOXYCYCLINE HYCLATE 100 MG
TABLET ORAL
COMMUNITY
End: 2024-12-23

## 2024-12-23 RX ORDER — PROMETHAZINE HYDROCHLORIDE 25 MG/1
TABLET ORAL
COMMUNITY

## 2024-12-23 RX ORDER — SULFASALAZINE 500 MG/1
TABLET, DELAYED RELEASE ORAL
COMMUNITY

## 2024-12-23 RX ORDER — ROSUVASTATIN CALCIUM 5 MG/1
TABLET, COATED ORAL
COMMUNITY

## 2024-12-23 RX ORDER — CELECOXIB AND TRAMADOL HYDROCHLORIDE 56; 44 MG/1; MG/1
TABLET ORAL
COMMUNITY
End: 2024-12-23

## 2024-12-23 RX ORDER — HYDROCODONE BITARTRATE AND ACETAMINOPHEN 10; 325 MG/1; MG/1
TABLET ORAL
COMMUNITY

## 2024-12-23 RX ORDER — HALOBETASOL PROPIONATE 0.05 %
OINTMENT (GRAM) TOPICAL
COMMUNITY

## 2024-12-23 ASSESSMENT — FIBROSIS 4 INDEX: FIB4 SCORE: 1.15

## 2024-12-24 NOTE — PROGRESS NOTES
Verbal consent was acquired by the patient to use [employer] ambient listening note generation during this visit     Date: 12/23/24        Chief Complaint   Patient presents with    Other     Patient coming in for runny nose with green discharge, cough and upset stomach, weakness x 2 months          History of Present Illness  The patient is a 69-year-old male who presents for evaluation of sinus infection, ocular migraines, stage 4 prostate cancer, and heart murmur.    He has been experiencing symptoms suggestive of a sinus infection for over 2 months, characterized by discharge, cough, and postnasal drip. The cough is persistent, occurring both during the day and night. He reports greenish discharge this week.  He states the cough is not severe.  He denies any shortness of breath.  He reports sinus congestion pain pressure with postnasal drip he also reports an upset stomach, which has led to a weight loss of 12 pounds. Accompanying these symptoms is a general feeling of fatigue.  He denies any nausea vomiting diarrhea but reports just decreased appetite.  He denies any bloody or black diarrhea.    He suffers from ocular migraines, with 4 episodes in the past 7 days. These migraines are associated with neck and shoulder pain. He takes Rizatriptan for his migraines.    He has stage 4 prostate cancer. He had his last injection a week ago and usually does blood tests every 3 months. He has been on prednisone for 3 years. His current medications include abiraterone, prednisone twice a day, Lupron, enalapril, hydrochlorothiazide, and Pepcid.  His numbers have been stable for quite some time.      Supplemental Information  He had Giardia once.    MEDICATIONS  Current: Abiraterone, prednisone, Lupron, enalapril, hydrochlorothiazide, Pepcid, rizatriptan.         ROS:    No severe shortness of breath   No Cardiac like chest pain, as discussed   As otherwise stated in HPI    Medical/SX/ Social History:  Reviewed per  chart    Pertinent Medications:    Current Outpatient Medications on File Prior to Visit   Medication Sig Dispense Refill    halobetasol (ULTRAVATE) 0.05 % ointment       HYDROcodone/acetaminophen (NORCO)  MG Tab .5-1 tablet by mouth every 4 hrs as needed for pain for 5 days      metoclopramide (REGLAN) 10 MG Tab       promethazine (PHENERGAN) 25 MG Tab       promethazine-codeine (PHENERGAN-CODEINE) 6.25-10 MG/5ML Syrup       rosuvastatin (CRESTOR) 5 MG Tab       sulfaSALAzine (AZULFIDINE EN-TAB) 500 MG EC tablet       ondansetron (ZOFRAN ODT) 4 MG TABLET DISPERSIBLE Take 1 Tablet by mouth every 6 hours as needed for Nausea/Vomiting for up to 15 doses. 15 Tablet 0    hydrochlorothiazide (MICROZIDE) 12.5 MG capsule       predniSONE (DELTASONE) 50 MG Tab TAKE ONE TABLET BY MOUTH ONE TIME DAILY for 5 days      UBRELVY 50 MG Tab TAKE ONE TABLET BY MOUTH DAILY AS NEEDED for headaches      triazolam (HALCION) 0.25 MG Tab       leuprolide (LUPRON) 22.5 MG Kit Inject 22.5 mg into the shoulder, thigh, or buttocks one time.      Abiraterone Acetate 250 MG Tab Take 1,000 mg by mouth every day.      sumatriptan (IMITREX) 100 MG tablet Take 100 mg by mouth as needed for Migraine.      famotidine (PEPCID) 20 MG Tab Take 20 mg by mouth every bedtime.      enalapril (VASOTEC) 10 MG TABS TAKE 1 TAB BY MOUTH 2 TIMES A DAY. 30 Each 0    Celecoxib-traMADol HCl (SEGLENTIS) 56-44 MG Tab Take 2 tablets by mouth every 12 hours as needed for pain.      doxycycline (VIBRAMYCIN) 100 MG Tab        No current facility-administered medications on file prior to visit.        Allergies:    Oxycodone     Problem list, medications, and allergies reviewed by myself today in Epic     Physical Exam:    Vitals:    12/23/24 1548   BP: 128/66   Pulse: 87   Resp: 16   Temp: 36.9 °C (98.4 °F)   SpO2: 98%             Physical Exam  Constitutional:       General: He is awake.      Appearance: Normal appearance. He is not ill-appearing, toxic-appearing  or diaphoretic.   HENT:      Head: Normocephalic and atraumatic.      Right Ear: Tympanic membrane, ear canal and external ear normal.      Left Ear: Tympanic membrane, ear canal and external ear normal.      Nose: Mucosal edema, congestion and rhinorrhea present. Rhinorrhea is clear.      Right Turbinates: Swollen.      Left Turbinates: Swollen.      Right Sinus: Maxillary sinus tenderness and frontal sinus tenderness present.      Left Sinus: Maxillary sinus tenderness and frontal sinus tenderness present.      Mouth/Throat:      Lips: Pink.      Mouth: Mucous membranes are moist.      Tongue: No lesions.      Palate: No lesions.      Pharynx: Posterior oropharyngeal erythema present. No oropharyngeal exudate or uvula swelling.      Tonsils: No tonsillar exudate or tonsillar abscesses.   Eyes:      General: Lids are normal. Gaze aligned appropriately. No allergic shiner or scleral icterus.     Extraocular Movements: Extraocular movements intact.      Conjunctiva/sclera: Conjunctivae normal.      Pupils: Pupils are equal, round, and reactive to light.   Cardiovascular:      Rate and Rhythm: Normal rate and regular rhythm.      Pulses:           Radial pulses are 2+ on the right side and 2+ on the left side.      Heart sounds: Murmur heard.   Pulmonary:      Effort: Pulmonary effort is normal.      Breath sounds: Normal breath sounds and air entry. No decreased breath sounds, wheezing, rhonchi or rales.   Abdominal:      General: Abdomen is flat. Bowel sounds are normal.      Palpations: Abdomen is soft.      Tenderness: There is no abdominal tenderness.   Musculoskeletal:      Right lower leg: No edema.      Left lower leg: No edema.   Lymphadenopathy:      Cervical: No cervical adenopathy.   Skin:     General: Skin is warm.      Capillary Refill: Capillary refill takes less than 2 seconds.      Coloration: Skin is not cyanotic or pale.   Neurological:      Mental Status: He is alert and oriented to person, place,  and time.      Gait: Gait is intact.   Psychiatric:         Behavior: Behavior normal. Behavior is cooperative.              Medical Decision making and plan :  I personally reviewed prior external notes and test results pertinent to today's visit. Pt is clinically stable at today's acute urgent care visit.  Patient appears nontoxic with no acute distress noted. Appropriate for outpatient care at this time.    Pleasant 69 y.o. male presented clinic with:     Assessment & Plan  1. Sinusitis.  Symptoms including postnasal drip, cough, and green discharge suggest a sinus infection.. His oxygen saturation is at 98 percent with clear lung sounds, indicating no pneumonia. A prescription for Augmentin has been issued to [facility] for a duration of 10 days. He is advised to monitor his symptoms and report any persistent nausea or continued weight loss.    2. Ocular migraines.  The patient experiences frequent ocular migraines, with 4 episodes in the past 7 days. He uses rizatriptan for migraine management.    3. Stage 4 prostate cancer.  The patient is currently on abiraterone, prednisone, and Lupron for stage 4 prostate cancer. He reports regular follow-ups with oncology every 3 months, including blood tests and injections. He is advised to discuss any persistent nausea or weight loss with his oncologist, as these could be red flags.    4. Heart murmur.  The patient was previously noted to have a  new heart murmur, which has not been followed up. Given the potential for valve issues and fluid backup causing chronic cough, a referral to cardiology has been made. He will receive a letter through [employer] with the referral details and can either call to make an appointment or wait for them to contact him.  Fortunately he has no shortness of breath or lower leg swelling.      Shared decision-making was utilized with patient for treatment plan. Medication discussed included indication for use and the potential benefits  and side effects. Education was provided regarding the aforementioned assessments.  Differential Diagnosis, natural history, and supportive care discussed. All of the patient's questions were answered to their satisfaction at the time of discharge. Patient was encouraged to monitor symptoms closely. Those signs and symptoms which would warrant concern and mandate seeking a higher level of service through the emergency department discussed at length.  Patient stated agreement and understanding of this plan of care.    Disposition:  Home in stable condition     Voice Recognition Disclaimer:  Portions of this document were created using voice recognition software and [employer] technology provided by [employer]. The software does have a chance of producing errors of grammar and possibly content. I have made every reasonable attempt to correct obvious errors, but there may be errors of grammar and possibly content that I did not discover before finalizing the  documentation.    CASI Hernández.

## 2025-01-28 ENCOUNTER — TELEPHONE (OUTPATIENT)
Dept: HEALTH INFORMATION MANAGEMENT | Facility: OTHER | Age: 70
End: 2025-01-28
Payer: COMMERCIAL

## 2025-03-11 ENCOUNTER — ANCILLARY PROCEDURE (OUTPATIENT)
Dept: CARDIOLOGY | Facility: MEDICAL CENTER | Age: 70
End: 2025-03-11
Attending: FAMILY MEDICINE
Payer: COMMERCIAL

## 2025-03-11 ENCOUNTER — TELEPHONE (OUTPATIENT)
Dept: CARDIOLOGY | Facility: MEDICAL CENTER | Age: 70
End: 2025-03-11
Payer: COMMERCIAL

## 2025-03-11 DIAGNOSIS — R01.1 CARDIAC MURMUR: ICD-10-CM

## 2025-03-11 LAB
LV EJECT FRACT  99904: 60
LV EJECT FRACT MOD 2C 99903: 60.09
LV EJECT FRACT MOD 4C 99902: 60.76
LV EJECT FRACT MOD BP 99901: 60

## 2025-03-11 PROCEDURE — 93306 TTE W/DOPPLER COMPLETE: CPT | Mod: 26 | Performed by: INTERNAL MEDICINE

## 2025-03-11 PROCEDURE — 93306 TTE W/DOPPLER COMPLETE: CPT

## 2025-03-12 NOTE — TELEPHONE ENCOUNTER
Dr Barrow ordered echo and it shows evere mitral regurgitation, please call and let him know to see Dr Jhonathan MCBRIDE to Dr Barrow

## 2025-03-12 NOTE — TELEPHONE ENCOUNTER
Phone Number Called: [phone]    Call outcome: Spoke to patient regarding message below.    Message: Called to inform patient of echo results per CW and his recommendations. Pt verbalized understanding and was appreciative of call.     Pt transferred to scheduling to set up appt with [facility].

## 2025-03-13 ENCOUNTER — OFFICE VISIT (OUTPATIENT)
Dept: CARDIOLOGY | Facility: MEDICAL CENTER | Age: 70
End: 2025-03-13
Attending: NURSE PRACTITIONER
Payer: COMMERCIAL

## 2025-03-13 ENCOUNTER — TELEPHONE (OUTPATIENT)
Dept: CARDIOLOGY | Facility: MEDICAL CENTER | Age: 70
End: 2025-03-13

## 2025-03-13 VITALS
HEIGHT: 70 IN | WEIGHT: 200 LBS | DIASTOLIC BLOOD PRESSURE: 76 MMHG | BODY MASS INDEX: 28.63 KG/M2 | OXYGEN SATURATION: 95 % | SYSTOLIC BLOOD PRESSURE: 116 MMHG | HEART RATE: 75 BPM

## 2025-03-13 DIAGNOSIS — E78.5 HYPERLIPIDEMIA, UNSPECIFIED HYPERLIPIDEMIA TYPE: Chronic | ICD-10-CM

## 2025-03-13 DIAGNOSIS — I10 HYPERTENSION, UNSPECIFIED TYPE: Chronic | ICD-10-CM

## 2025-03-13 DIAGNOSIS — I34.0 MITRAL VALVE INSUFFICIENCY, UNSPECIFIED ETIOLOGY: ICD-10-CM

## 2025-03-13 PROCEDURE — 99214 OFFICE O/P EST MOD 30 MIN: CPT | Performed by: INTERNAL MEDICINE

## 2025-03-13 PROCEDURE — 99212 OFFICE O/P EST SF 10 MIN: CPT | Performed by: INTERNAL MEDICINE

## 2025-03-13 PROCEDURE — 3074F SYST BP LT 130 MM HG: CPT | Performed by: INTERNAL MEDICINE

## 2025-03-13 PROCEDURE — 3078F DIAST BP <80 MM HG: CPT | Performed by: INTERNAL MEDICINE

## 2025-03-13 ASSESSMENT — FIBROSIS 4 INDEX: FIB4 SCORE: 1.15

## 2025-03-13 NOTE — PROGRESS NOTES
[facility]    PatientName:Brian SegundocDate: 3/13/2025  :1955    69 y.o.PCP:Sky MATSON M.D.  MRN:[id number]        Problems and Plans    Hypertension  Blood pressures currently well-controlled on current medications.  No changes are made at this time    Hyperlipidemia  Regarding his dyslipidemia he will continue with ongoing statin therapy and integration of a healthy lifestyle    Mitral valve insufficiency  Regarding his mitral valve insufficiency this is noted to be severe on his most recent echocardiogram in which I suspect that the severity may have been over estimated.  I have recommended for further definition to undergo a transesophageal echocardiogram which will be performed in early 2025.  At this time no restrictions in terms of travel or physical limitations to the patient.  He is amenable to this current course of action.  Will make further recommendations pending further imaging.    Return in about 4 months (around 2025).      Encounter    Reason for Visit / Chief Complaint: Mitral valve regurgitation    HPI    69-year-old male with active prostate cancer on hormone deprivation therapy, hypertension, gastroesophageal reflux disease, prior Hashimoto's thyroiditis presents in clinic for ongoing management of his mitral valve regurgitation.    Currently, he notes he is feeling well not having any adverse cardiovascular complaints.  He is attending the gym on multiple times per week doing various activities without having any dyspnea complaints.  He was seen in urgent care and had a murmur detected and followed up closely with his primary care provider.  He underwent echocardiogram evaluation with concern for severe mitral valve regurgitation.    Most recent cardiovascular workup consists of an echocardiogram performed on 3/11/2025 which demonstrated normal LV systolic function with preserved ejection fraction estimated at 60% with  reported severe mitral valve regurgitation per report left atrium was normal in size.  I reviewed the echocardiogram and noted no significant pulmonary vein reversal and highly turbulent valvular color flow.    Past Medical History  Past Medical History:   Diagnosis Date    Anesthesia     Pt reports small airway and difficulty intubating.    Arthritis     Osteo to knee and neck and fingers    Cancer (HCC) 10/2013    Prostate-prostatectomy and radiation x2(recently cyber knife 8/2018)    Dermatitis     Disorder of thyroid     Hashimotos (last blood test said no)-no current meds    Hayfever     Heart burn     Treated with pepcid.    High cholesterol     Hypertension     Indigestion     Treated with pepcid.    Migraines     3-4 per year    Right knee pain 12/05/2019     Past Surgical History  Past Surgical History:   Procedure Laterality Date    PB LAMINOTOMY,LUMBAR DISK,1 INTRSP N/A 6/9/2023    Procedure: POSTERIOR LUMBAR BILATERAL L4-5 MICRODISCECTOMY;  Surgeon: Vel Nichole M.D.;  Location: [facility];  Service: Neurosurgery    PB PLASTY KNEE,MED OR LAT COMPARTMT Right 12/9/2019    Procedure: ARTHROPLASTY, KNEE, UNICOMPARTMENTAL - VERSUS;  Surgeon: Olman Baez M.D.;  Location: [facility];  Service: Orthopedics    KNEE ARTHROSCOPY Right 9/13/2018    Procedure: KNEE ARTHROSCOPY;  Surgeon: Olman Baez M.D.;  Location: [facility];  Service: Orthopedics    MENISCECTOMY, KNEE, MEDIAL Right 9/13/2018    Procedure: MEDIAL MENISCECTOMY - PARTIAL, KEITH;  Surgeon: Olman Baez M.D.;  Location: [facility];  Service: Orthopedics    BLADDER NECK CONTRACTURE EXICISION  07/2018    INGUINAL HERNIA REPAIR Left 4/13/2017    Procedure: INGUINAL HERNIA REPAIR- OPEN;  Surgeon: Selvin Peña M.D.;  Location: [facility];  Service:     HERNIA REPAIR, INCISIONAL, UNILATERAL Left 11/2014    and by hunter scar    COLONOSCOPY  2014    PROSTATECTOMY,  RADIAL  10/2013    robotic for cancer    JERMAINE BY LAPAROSCOPY  04/2010    SEPTOPLASTY  1997    DENTAL EXTRACTION(S)  1971    TONSILLECTOMY  1959     Social History  Social History     Socioeconomic History    Marital status:      Spouse name: Not on file    Number of children: Not on file    Years of education: Not on file    Highest education level: Not on file   Occupational History    Not on file   Tobacco Use    Smoking status: Never    Smokeless tobacco: Never   Vaping Use    Vaping status: Never Used   Substance and Sexual Activity    Alcohol use: Yes     Alcohol/week: 0.6 oz     Types: 1 Standard drinks or equivalent per week     Comment: 1 per week    Drug use: Yes     Types: Oral     Comment: gummies  once every 3 months for sleep - last used 3/2023    Sexual activity: Yes     Partners: Female   Other Topics Concern    Not on file   Social History Narrative    Not on file     Social Drivers of Health     Financial Resource Strain: Not on file   Food Insecurity: Not on file   Transportation Needs: Not on file   Physical Activity: Not on file   Stress: Not on file   Social Connections: Not on file   Intimate Partner Violence: Not on file   Housing Stability: Not on file     Past Family History  Family History   Problem Relation Age of Onset    Hypertension Mother     Lung Disease Father     Cancer Father     Prostate cancer Father     Thyroid Father     Hypertension Father     Allergies Father     Arthritis Father      Medication(s)    Current Outpatient Medications:     hydrochlorothiazide, , Taking    predniSONE, TAKE ONE TABLET BY MOUTH ONE TIME DAILY for 5 days (Patient taking differently: 5mg twice a day), Taking Differently    Ubrelvy, TAKE ONE TABLET BY MOUTH DAILY AS NEEDED for headaches, PRN    leuprolide, 22.5 mg, Intramuscular, Once, Taking    Abiraterone Acetate, 1,000 mg, Oral, QDAY, Taking    famotidine, 20 mg, Oral, QHS, Taking    enalapril, TAKE 1 TAB BY MOUTH 2 TIMES A DAY., Taking     "promethazine, , Unknown    rosuvastatin, , Unknown    sulfaSALAzine, , Unknown  Allergies  Oxycodone    Review of Systems    A comprehensive 10 system review was conducted and is negative except as noted above in the HPI or here.      Vital Signs  /76 (BP Location: Left arm, Patient Position: Sitting, BP Cuff Size: Adult)   Pulse 75   Ht 1.778 m (5' 10\")   Wt 90.7 kg (200 lb)   SpO2 95%   BMI 28.70 kg/m²     Physical Exam  Constitutional:       Appearance: Normal appearance. He is obese.   HENT:      Head: Normocephalic and atraumatic.      Mouth/Throat:      Mouth: Mucous membranes are moist.      Pharynx: Oropharynx is clear.   Eyes:      Extraocular Movements: Extraocular movements intact.      Conjunctiva/sclera: Conjunctivae normal.   Cardiovascular:      Rate and Rhythm: Normal rate and regular rhythm.      Pulses: Normal pulses.      Heart sounds: Murmur (2 out of 6 high-pitched, mid peaking left midsternal murmur) heard.      No friction rub. No gallop.   Pulmonary:      Effort: Pulmonary effort is normal.      Breath sounds: Normal breath sounds.   Abdominal:      General: Bowel sounds are normal.      Palpations: Abdomen is soft.   Musculoskeletal:         General: Normal range of motion.      Cervical back: Normal range of motion and neck supple.      Right lower leg: No edema.      Left lower leg: No edema.   Skin:     General: Skin is warm and dry.   Neurological:      General: No focal deficit present.      Mental Status: He is alert and oriented to person, place, and time. Mental status is at baseline.   Psychiatric:         Mood and Affect: Mood normal.         Behavior: Behavior normal.         Thought Content: Thought content normal.         Judgment: Judgment normal.         Lab Results   Component Value Date/Time    TSHULTRASEN 0.460 12/22/2017 1100      No results found for: \"FREET4\"   No results found for: \"HBA1C\"    Lab Results   Component Value Date/Time    CHOLSTRLTOT 160 " 04/09/2007 05:20 AM     (H) 04/09/2007 05:20 AM    HDL 48 04/09/2007 05:20 AM    TRIGLYCERIDE 51 04/09/2007 05:20 AM         Lab Results   Component Value Date/Time    SODIUM 138 06/09/2023 07:20 AM    POTASSIUM 3.5 (L) 06/09/2023 07:20 AM    CHLORIDE 102 06/09/2023 07:20 AM    CO2 22 06/09/2023 07:20 AM    GLUCOSE 87 06/09/2023 07:20 AM    BUN 30 (H) 06/09/2023 07:20 AM    CREATININE 1.13 06/09/2023 07:20 AM    CREATININE 1.0 04/13/2007 05:39 AM       Lab Results   Component Value Date/Time    ALKPHOSPHAT 74 06/09/2023 07:20 AM    ASTSGOT 23 06/09/2023 07:20 AM    ALTSGPT 31 06/09/2023 07:20 AM    TBILIRUBIN 0.4 06/09/2023 07:20 AM           Total patient time was estimated to be 30 minutes consisting of chart review, direct patient interaction, medication renewal, plan development and overall communication with the cardiovascular team.        Electronically signed by:   Lon Mercado DO, MPH  [facility]    Portions of this note were completed using voice recognition software (Dragon Naturally speaking software) . Occasional transcription errors may have escaped proof reading. I have made every reasonable attempt to correct obvious errors, but I expect that there are errors of grammar and possibly content that I did not discover before finalizing the note.

## 2025-03-13 NOTE — ASSESSMENT & PLAN NOTE
Regarding his dyslipidemia he will continue with ongoing statin therapy and integration of a healthy lifestyle

## 2025-03-13 NOTE — ASSESSMENT & PLAN NOTE
Regarding his mitral valve insufficiency this is noted to be severe on his most recent echocardiogram in which I suspect that the severity may have been over estimated.  I have recommended for further definition to undergo a transesophageal echocardiogram which will be performed in early July 2025.  At this time no restrictions in terms of travel or physical limitations to the patient.  He is amenable to this current course of action.  Will make further recommendations pending further imaging.

## 2025-03-13 NOTE — ASSESSMENT & PLAN NOTE
Blood pressures currently well-controlled on current medications.  No changes are made at this time

## 2025-03-13 NOTE — PATIENT INSTRUCTIONS
Follow up in 4-5 months  Check a LAKIA in early July  Continue current medications  Call with questions

## 2025-03-14 ENCOUNTER — TELEPHONE (OUTPATIENT)
Dept: CARDIOLOGY | Facility: MEDICAL CENTER | Age: 70
End: 2025-03-14
Payer: COMMERCIAL

## 2025-03-14 NOTE — TELEPHONE ENCOUNTER
Patient is scheduled for a LAKIA with anesthesia on 04- with Dr. Mercado. Patient has been instructed to check in at 8:00 am for 10:00 procedure. No meds to hold. Patient has been advised they will need a  home and with them after since they are sedated. Message sent to authorizations. Sent [employer] message to pt with instructions. LUIS CARLOSI sent to Jess

## 2025-03-14 NOTE — TELEPHONE ENCOUNTER
To BD: ~FYI. I spoke to pt he wants to get his LAKIA done as soon as possible because pt is planning to retire in July. LAKIA order received and awaiting for BD's signature.

## 2025-03-14 NOTE — TELEPHONE ENCOUNTER
BD    Caller: Brian Haynes     Topic/issue: Pt called in regards to wanting to discuss changing the testing pt would like a call back to discuss further please advise     Callback Number: [phone] (home)      Thank you,     Phil RAMOS

## 2025-03-18 NOTE — TELEPHONE ENCOUNTER
Case scheduled with [facility]. Paperwork emailed to [facility] lab scheduling.

## 2025-03-24 ENCOUNTER — PATIENT MESSAGE (OUTPATIENT)
Dept: CARDIOLOGY | Facility: MEDICAL CENTER | Age: 70
End: 2025-03-24

## 2025-03-24 ENCOUNTER — OFFICE VISIT (OUTPATIENT)
Dept: URGENT CARE | Facility: CLINIC | Age: 70
End: 2025-03-24
Payer: COMMERCIAL

## 2025-03-24 VITALS
HEIGHT: 70 IN | HEART RATE: 84 BPM | RESPIRATION RATE: 18 BRPM | TEMPERATURE: 96.6 F | BODY MASS INDEX: 28.06 KG/M2 | DIASTOLIC BLOOD PRESSURE: 66 MMHG | WEIGHT: 196 LBS | OXYGEN SATURATION: 97 % | SYSTOLIC BLOOD PRESSURE: 102 MMHG

## 2025-03-24 DIAGNOSIS — R55 NEAR SYNCOPE: ICD-10-CM

## 2025-03-24 PROCEDURE — 99214 OFFICE O/P EST MOD 30 MIN: CPT | Performed by: PHYSICIAN ASSISTANT

## 2025-03-24 PROCEDURE — 3078F DIAST BP <80 MM HG: CPT | Performed by: PHYSICIAN ASSISTANT

## 2025-03-24 PROCEDURE — 93000 ELECTROCARDIOGRAM COMPLETE: CPT | Performed by: PHYSICIAN ASSISTANT

## 2025-03-24 PROCEDURE — 3074F SYST BP LT 130 MM HG: CPT | Performed by: PHYSICIAN ASSISTANT

## 2025-03-24 ASSESSMENT — FIBROSIS 4 INDEX: FIB4 SCORE: 1.15

## 2025-03-24 ASSESSMENT — ENCOUNTER SYMPTOMS
PALPITATIONS: 1
HEADACHES: 0
FOCAL WEAKNESS: 0
SHORTNESS OF BREATH: 0
CHILLS: 0
FEVER: 0
DIZZINESS: 1

## 2025-03-24 NOTE — PROGRESS NOTES
Subjective     Ron Haynes is a 69 y.o. male who presents with Other (Was recently dx with having heart problems and is requesting EKG today. (Appt notes say: listen to heart for poss:murmur) /Cardio appt is 4/21/25..)            Brian Haynes is a 69 y.o. male who presents today requesting an EKG.  Patient reports that he was fairly recently diagnosed with a heart murmur.  Seeing cardiology and had an echocardiogram which showed moderate to severe mitral regurgitation.  Has a LAKIA scheduled for next month.  Has never had any symptoms related to this murmur.  Got concerned because yesterday he and his wife went up to [place] to  their son.  While he was sitting in the passenger seat he had a brief episode where he felt faint.  Says that everything started to go black and spin.  He did not actually pass out and the symptoms lasted for 30 seconds or less and have not returned.  He did not have any associated shortness of breath or chest pain.  Started to pay attention to his heart rate a little bit the rest of the day noticed that it was variable anywhere from mid 40s to 120s/130s.  He is not sure if he has had these issues before as he is never tracked it.  He says that he reached out to a friend of his who is a physician and they recommended that he get an EKG.        Review of Systems   Constitutional:  Negative for chills and fever.   Respiratory:  Negative for shortness of breath.    Cardiovascular:  Positive for palpitations. Negative for chest pain.   Neurological:  Positive for dizziness. Negative for focal weakness and headaches. Loss of consciousness: near syncope.          PMH:  has a past medical history of Anesthesia, Arthritis, Cancer (HCC) (10/2013), Dermatitis, Disorder of thyroid, Hayfever, Heart burn, High cholesterol, Hypertension, Indigestion, Migraines, and Right knee pain (12/05/2019).  MEDS:   Current Outpatient Medications:     hydrochlorothiazide (MICROZIDE) 12.5 MG  capsule, , Disp: , Rfl:     UBRELVY 50 MG Tab, TAKE ONE TABLET BY MOUTH DAILY AS NEEDED for headaches, Disp: , Rfl:     leuprolide (LUPRON) 22.5 MG Kit, Inject 22.5 mg into the shoulder, thigh, or buttocks one time., Disp: , Rfl:     Abiraterone Acetate 250 MG Tab, Take 1,000 mg by mouth every day., Disp: , Rfl:     famotidine (PEPCID) 20 MG Tab, Take 20 mg by mouth every bedtime., Disp: , Rfl:     enalapril (VASOTEC) 10 MG TABS, TAKE 1 TAB BY MOUTH 2 TIMES A DAY., Disp: 30 Each, Rfl: 0    promethazine (PHENERGAN) 25 MG Tab, , Disp: , Rfl:     rosuvastatin (CRESTOR) 5 MG Tab, , Disp: , Rfl:     sulfaSALAzine (AZULFIDINE EN-TAB) 500 MG EC tablet, , Disp: , Rfl:     predniSONE (DELTASONE) 50 MG Tab, TAKE ONE TABLET BY MOUTH ONE TIME DAILY for 5 days (Patient taking differently: 5mg twice a day), Disp: , Rfl:   ALLERGIES:   Allergies   Allergen Reactions    Oxycodone Rash     .     SURGHX:   Past Surgical History:   Procedure Laterality Date    PB LAMINOTOMY,LUMBAR DISK,1 INTRSP N/A 6/9/2023    Procedure: POSTERIOR LUMBAR BILATERAL L4-5 MICRODISCECTOMY;  Surgeon: Vel Nichole M.D.;  Location: [facility];  Service: Neurosurgery    PB PLASTY KNEE,MED OR LAT COMPARTMT Right 12/9/2019    Procedure: ARTHROPLASTY, KNEE, UNICOMPARTMENTAL - VERSUS;  Surgeon: Olman Baez M.D.;  Location: [facility];  Service: Orthopedics    KNEE ARTHROSCOPY Right 9/13/2018    Procedure: KNEE ARTHROSCOPY;  Surgeon: Olman Baez M.D.;  Location: [facility];  Service: Orthopedics    MENISCECTOMY, KNEE, MEDIAL Right 9/13/2018    Procedure: MEDIAL MENISCECTOMY - PARTIAL, KEITH;  Surgeon: Olman Baez M.D.;  Location: [facility];  Service: Orthopedics    BLADDER NECK CONTRACTURE EXICISION  07/2018    INGUINAL HERNIA REPAIR Left 4/13/2017    Procedure: INGUINAL HERNIA REPAIR- OPEN;  Surgeon: Selvin Peña M.D.;  Location: [facility];  Service:     HERNIA  "REPAIR, INCISIONAL, UNILATERAL Left 11/2014    and by hunter scar    COLONOSCOPY  2014    PROSTATECTOMY, RADIAL  10/2013    robotic for cancer    HUNTER BY LAPAROSCOPY  04/2010    SEPTOPLASTY  1997    DENTAL EXTRACTION(S)  1971    TONSILLECTOMY  1959     SOCHX:  reports that he has never smoked. He has never used smokeless tobacco. He reports current alcohol use of about 0.6 oz of alcohol per week. He reports current drug use. Drug: Oral.  FH: Family history was reviewed, no pertinent findings to report      Objective     /66 (BP Location: Left arm, Patient Position: Sitting, BP Cuff Size: Large adult)   Pulse 84   Temp 35.9 °C (96.6 °F) (Temporal)   Resp 18   Ht 1.778 m (5' 10\")   Wt 88.9 kg (196 lb)   SpO2 97%   BMI 28.12 kg/m²      Physical Exam  Constitutional:       Appearance: He is well-developed.   HENT:      Head: Normocephalic and atraumatic.      Right Ear: External ear normal.      Left Ear: External ear normal.   Eyes:      Conjunctiva/sclera: Conjunctivae normal.      Pupils: Pupils are equal, round, and reactive to light.   Cardiovascular:      Rate and Rhythm: Normal rate and regular rhythm.      Heart sounds: Murmur heard.   Pulmonary:      Effort: Pulmonary effort is normal.      Breath sounds: Normal breath sounds. No wheezing.   Musculoskeletal:      Cervical back: Normal range of motion.   Lymphadenopathy:      Cervical: No cervical adenopathy.   Skin:     General: Skin is warm and dry.      Capillary Refill: Capillary refill takes less than 2 seconds.   Neurological:      Mental Status: He is alert and oriented to person, place, and time.   Psychiatric:         Behavior: Behavior normal.         Judgment: Judgment normal.             EKG Interpretation - HR is 72 unchanged from previous tracings, normal sinus rhythm, right axis deviation        Assessment & Plan     1. Near syncope  - EKG - Clinic Performed  On exam his vital signs are stable.  EKG shows normal sinus rhythm.  No " arrhythmia noted.  He is not experiencing any of the symptoms right now and did not have any associated chest pain or shortness of breath with his near syncopal episode.  Recommend that he reach out to his cardiologist as he may have an undiagnosed arrhythmia that needs further workup.  Strict ER precautions discussed in the interim.        Differential Diagnosis, natural history, and supportive care discussed. Return to the Urgent Care or follow up with your PCP if symptoms fail to resolve, or for any new or worsening symptoms. Emergency room precautions discussed. Patient and/or family appears understanding of information.

## 2025-03-25 ENCOUNTER — APPOINTMENT (OUTPATIENT)
Dept: ADMISSIONS | Facility: MEDICAL CENTER | Age: 70
End: 2025-03-25
Attending: INTERNAL MEDICINE
Payer: COMMERCIAL

## 2025-03-26 ENCOUNTER — HOSPITAL ENCOUNTER (OUTPATIENT)
Dept: RADIOLOGY | Facility: MEDICAL CENTER | Age: 70
End: 2025-03-26
Attending: PHYSICIAN ASSISTANT
Payer: COMMERCIAL

## 2025-03-26 DIAGNOSIS — M85.80 SAPHO SYNDROME (HCC): ICD-10-CM

## 2025-03-26 DIAGNOSIS — M86.9 SAPHO SYNDROME (HCC): ICD-10-CM

## 2025-03-26 DIAGNOSIS — L40.3 SAPHO SYNDROME (HCC): ICD-10-CM

## 2025-03-26 DIAGNOSIS — L70.9 SAPHO SYNDROME (HCC): ICD-10-CM

## 2025-03-26 DIAGNOSIS — M65.90 SAPHO SYNDROME (HCC): ICD-10-CM

## 2025-03-26 PROCEDURE — 77080 DXA BONE DENSITY AXIAL: CPT

## 2025-03-30 ENCOUNTER — APPOINTMENT (OUTPATIENT)
Dept: RADIOLOGY | Facility: MEDICAL CENTER | Age: 70
End: 2025-03-30
Attending: EMERGENCY MEDICINE
Payer: COMMERCIAL

## 2025-03-30 ENCOUNTER — HOSPITAL ENCOUNTER (EMERGENCY)
Facility: MEDICAL CENTER | Age: 70
End: 2025-03-30
Attending: EMERGENCY MEDICINE
Payer: COMMERCIAL

## 2025-03-30 VITALS
HEIGHT: 70 IN | OXYGEN SATURATION: 96 % | WEIGHT: 195 LBS | BODY MASS INDEX: 27.92 KG/M2 | DIASTOLIC BLOOD PRESSURE: 92 MMHG | HEART RATE: 73 BPM | RESPIRATION RATE: 18 BRPM | TEMPERATURE: 96.5 F | SYSTOLIC BLOOD PRESSURE: 162 MMHG

## 2025-03-30 DIAGNOSIS — R55 POSTURAL DIZZINESS WITH PRESYNCOPE: ICD-10-CM

## 2025-03-30 DIAGNOSIS — R51.9 CHRONIC INTRACTABLE HEADACHE, UNSPECIFIED HEADACHE TYPE: ICD-10-CM

## 2025-03-30 DIAGNOSIS — G89.29 CHRONIC INTRACTABLE HEADACHE, UNSPECIFIED HEADACHE TYPE: ICD-10-CM

## 2025-03-30 DIAGNOSIS — R55 NEAR SYNCOPE: ICD-10-CM

## 2025-03-30 DIAGNOSIS — R42 POSTURAL DIZZINESS WITH PRESYNCOPE: ICD-10-CM

## 2025-03-30 LAB
ALBUMIN SERPL BCP-MCNC: 4.1 G/DL (ref 3.2–4.9)
ALBUMIN/GLOB SERPL: 1.5 G/DL
ALP SERPL-CCNC: 86 U/L (ref 30–99)
ALT SERPL-CCNC: 19 U/L (ref 2–50)
ANION GAP SERPL CALC-SCNC: 12 MMOL/L (ref 7–16)
AST SERPL-CCNC: 25 U/L (ref 12–45)
BASOPHILS # BLD AUTO: 0.5 % (ref 0–1.8)
BASOPHILS # BLD: 0.06 K/UL (ref 0–0.12)
BILIRUB SERPL-MCNC: <0.2 MG/DL (ref 0.1–1.5)
BUN SERPL-MCNC: 19 MG/DL (ref 8–22)
CALCIUM ALBUM COR SERPL-MCNC: 9.3 MG/DL (ref 8.5–10.5)
CALCIUM SERPL-MCNC: 9.4 MG/DL (ref 8.5–10.5)
CHLORIDE SERPL-SCNC: 102 MMOL/L (ref 96–112)
CO2 SERPL-SCNC: 23 MMOL/L (ref 20–33)
CREAT SERPL-MCNC: 0.86 MG/DL (ref 0.5–1.4)
EKG IMPRESSION: NORMAL
EOSINOPHIL # BLD AUTO: 0.22 K/UL (ref 0–0.51)
EOSINOPHIL NFR BLD: 1.8 % (ref 0–6.9)
ERYTHROCYTE [DISTWIDTH] IN BLOOD BY AUTOMATED COUNT: 42.4 FL (ref 35.9–50)
GFR SERPLBLD CREATININE-BSD FMLA CKD-EPI: 95 ML/MIN/1.73 M 2
GLOBULIN SER CALC-MCNC: 2.8 G/DL (ref 1.9–3.5)
GLUCOSE SERPL-MCNC: 119 MG/DL (ref 65–99)
HCT VFR BLD AUTO: 39.8 % (ref 42–52)
HGB BLD-MCNC: 13.8 G/DL (ref 14–18)
IMM GRANULOCYTES # BLD AUTO: 0.1 K/UL (ref 0–0.11)
IMM GRANULOCYTES NFR BLD AUTO: 0.8 % (ref 0–0.9)
LYMPHOCYTES # BLD AUTO: 1 K/UL (ref 1–4.8)
LYMPHOCYTES NFR BLD: 8.2 % (ref 22–41)
MCH RBC QN AUTO: 30.3 PG (ref 27–33)
MCHC RBC AUTO-ENTMCNC: 34.7 G/DL (ref 32.3–36.5)
MCV RBC AUTO: 87.3 FL (ref 81.4–97.8)
MONOCYTES # BLD AUTO: 1 K/UL (ref 0–0.85)
MONOCYTES NFR BLD AUTO: 8.2 % (ref 0–13.4)
NEUTROPHILS # BLD AUTO: 9.86 K/UL (ref 1.82–7.42)
NEUTROPHILS NFR BLD: 80.5 % (ref 44–72)
NRBC # BLD AUTO: 0 K/UL
NRBC BLD-RTO: 0 /100 WBC (ref 0–0.2)
NT-PROBNP SERPL IA-MCNC: 114 PG/ML (ref 0–125)
PLATELET # BLD AUTO: 234 K/UL (ref 164–446)
PMV BLD AUTO: 8.5 FL (ref 9–12.9)
POTASSIUM SERPL-SCNC: 3.7 MMOL/L (ref 3.6–5.5)
PROT SERPL-MCNC: 6.9 G/DL (ref 6–8.2)
RBC # BLD AUTO: 4.56 M/UL (ref 4.7–6.1)
SODIUM SERPL-SCNC: 137 MMOL/L (ref 135–145)
TROPONIN T SERPL-MCNC: 8 NG/L (ref 6–19)
TROPONIN T SERPL-MCNC: 9 NG/L (ref 6–19)
WBC # BLD AUTO: 12.2 K/UL (ref 4.8–10.8)

## 2025-03-30 PROCEDURE — 80053 COMPREHEN METABOLIC PANEL: CPT

## 2025-03-30 PROCEDURE — 700105 HCHG RX REV CODE 258: Performed by: EMERGENCY MEDICINE

## 2025-03-30 PROCEDURE — 84484 ASSAY OF TROPONIN QUANT: CPT | Mod: 91

## 2025-03-30 PROCEDURE — 70450 CT HEAD/BRAIN W/O DYE: CPT

## 2025-03-30 PROCEDURE — 93005 ELECTROCARDIOGRAM TRACING: CPT | Mod: TC | Performed by: EMERGENCY MEDICINE

## 2025-03-30 PROCEDURE — 99284 EMERGENCY DEPT VISIT MOD MDM: CPT

## 2025-03-30 PROCEDURE — 71045 X-RAY EXAM CHEST 1 VIEW: CPT

## 2025-03-30 PROCEDURE — 83880 ASSAY OF NATRIURETIC PEPTIDE: CPT

## 2025-03-30 PROCEDURE — 36415 COLL VENOUS BLD VENIPUNCTURE: CPT

## 2025-03-30 PROCEDURE — 85025 COMPLETE CBC W/AUTO DIFF WBC: CPT

## 2025-03-30 PROCEDURE — 93005 ELECTROCARDIOGRAM TRACING: CPT | Mod: TC

## 2025-03-30 RX ORDER — SODIUM CHLORIDE 9 MG/ML
1000 INJECTION, SOLUTION INTRAVENOUS ONCE
Status: COMPLETED | OUTPATIENT
Start: 2025-03-30 | End: 2025-03-30

## 2025-03-30 RX ORDER — PREDNISONE 5 MG/1
5 TABLET ORAL 2 TIMES DAILY
COMMUNITY
Start: 2021-03-20

## 2025-03-30 RX ADMIN — SODIUM CHLORIDE 1000 ML: 9 INJECTION, SOLUTION INTRAVENOUS at 16:47

## 2025-03-30 ASSESSMENT — FIBROSIS 4 INDEX: FIB4 SCORE: 1.15

## 2025-03-30 NOTE — ED NOTES
Pt ambulated to bathroom. Vs stable. Family at bedside. Call light in place. Will continue to monitor.

## 2025-03-30 NOTE — ED NOTES
Med rec is complete per Patient at bedside.  Family/friend/caregiver present at time of interview with permission from Patient.  Reconcile outside Information was available?Y  Allergies reviewed.    Has patient had any outside antibiotics in the last 30 days? N    Any Anticoagulants (rivaroxaban, apixaban, edoxaban, dabigatran, warfarin, enoxaparin) taken in the last 14 days? N           Pharmacy/Pharmacies Pt utilizes : [facility] [phone]

## 2025-03-30 NOTE — Clinical Note
Brian Haynes was seen and treated in our emergency department on 3/30/2025.  He may return to work on 04/14/2025.  Or until further guidance from physician     If you have any questions or concerns, please don't hesitate to call.      Jazmine Simons M.D.

## 2025-03-30 NOTE — ED NOTES
Pt in room, cardiac monitor on NSR Noted. Family at bedside. Call light in place. Will continue to monitor.

## 2025-03-30 NOTE — ED TRIAGE NOTES
"Chief Complaint   Patient presents with    Dizziness     Three episodes in the last week, one episode standing and two episodes while sitting    Near Syncopal     X 1 episode during a dizzy spell, denies complete LOC    Migraine     Chronic however pt states he used to get one every 3 months but now he is having them daily x 2 weeks    Blurred Vision     X 1 week, more consistent in R eye, states one episode of 'double vision'     Pt to triage in  for above complaints, EKG completed, VSS on RA, GCS 15, NAD.     Pt is currently undergoing PO treatment for prostate cancer, states no recent changes to meds.     Pt returned to [facility]. Educated on triage process and to inform staff of any changes.     BP (!) 153/90   Pulse 87   Temp 35.8 °C (96.5 °F) (Temporal)   Resp 16   Ht 1.778 m (5' 10\")   Wt 88.5 kg (195 lb)   SpO2 98%   BMI 27.98 kg/m²     "

## 2025-03-30 NOTE — ED PROVIDER NOTES
ED Provider Note    CHIEF COMPLAINT  Chief Complaint   Patient presents with    Dizziness     Three episodes in the last week, one episode standing and two episodes while sitting    Near Syncopal     X 1 episode during a dizzy spell, denies complete LOC    Migraine     Chronic however pt states he used to get one every 3 months but now he is having them daily x 2 weeks    Blurred Vision     X 1 week, more consistent in R eye, states one episode of 'double vision'     EXTERNAL RECORDS REVIEWED  Patient was seen at urgent care on 3/24/2025 for near syncope and he had a reassuring EKG at that time and was discharged. He is followed with cardiology and last saw them on 3/13/2025. He has mitral valve insufficiency with his last echocardiogram on 3/11/2025 with a  normal EF however concerns for severe mitral regurgitation. He has a history of hypertension Hyperlipidemia. He made a plan for a repeat echocardiogram in July.      HPI/ROS  LIMITATION TO HISTORY   Select: : None  OUTSIDE HISTORIAN(S):  Significant other Patient's wife is present at bedside and helped him recall his past medical history and describe his symptoms.      Brian Haynes is a 69 y.o. male who presents to the Emergency Department for evaluation of intermittent episodes lightheadedness/ near syncope onset one week ago. He is concerned due to his recent echocardiogram, which showed mitral valve insufficiency.  He notes that for the past two months he has been generally fatigued, and feels like he needs to sleep all this time, which has prompted him to begin working from home. He has had intermittent dizzy spells for the past week. He has not noticed his dizziness to be positional.  Patient explains that his first episode of dizziness felt like his vision was blackening, the second episode felt similar to lightheadedness. Patient has not ever fully lost consciousness during any of those episodes, but  one of these episodes caused him to fall to  "his knees. Patient has a history of migraines, but says that for the past several weeks he has had migraines daily. He has associated neck pain with his headaches, and says that he has a current headache, which he locates to the front of his head. Feels similar to prior migraines, not currently severe.  He adds that he has some visual changes with his regular headaches, describing those as \"streaks crossing the eyes\". He notes that his right eye has also felt more blurry.  He is not followed with neurology for migraines. . He denies any recent cough, fever, or congestion. Patient has a history of back surgery.     PAST MEDICAL HISTORY  Past Medical History:   Diagnosis Date    Anesthesia     Pt reports small airway and difficulty intubating.    Arthritis     Osteo to knee and neck and fingers    Cancer (HCC) 10/2013    Prostate-prostatectomy and radiation x2(recently cyber knife 8/2018)    Dermatitis     Disorder of thyroid     Hashimotos (last blood test said no)-no current meds    Hayfever     Heart burn     Treated with pepcid.    High cholesterol     Hypertension     Indigestion     Treated with pepcid.    Migraines     3-4 per year    Right knee pain 12/05/2019        SURGICAL HISTORY  Past Surgical History:   Procedure Laterality Date    PB LAMINOTOMY,LUMBAR DISK,1 INTRSP N/A 6/9/2023    Procedure: POSTERIOR LUMBAR BILATERAL L4-5 MICRODISCECTOMY;  Surgeon: Vel Nichole M.D.;  Location: [facility];  Service: Neurosurgery    PB PLASTY KNEE,MED OR LAT COMPARTMT Right 12/9/2019    Procedure: ARTHROPLASTY, KNEE, UNICOMPARTMENTAL - VERSUS;  Surgeon: Olman Baez M.D.;  Location: [facility];  Service: Orthopedics    KNEE ARTHROSCOPY Right 9/13/2018    Procedure: KNEE ARTHROSCOPY;  Surgeon: Olman Baez M.D.;  Location: [facility];  Service: Orthopedics    MENISCECTOMY, KNEE, MEDIAL Right 9/13/2018    Procedure: MEDIAL MENISCECTOMY - PARTIAL, KEITH;  Surgeon: " Olman Baez M.D.;  Location: SUR[facility]ERY [facility];  Service: Orthopedics    BLADDER NECK CONTRACTURE EXICISION  07/2018    INGUINAL HERNIA REPAIR Left 4/13/2017    Procedure: INGUINAL HERNIA REPAIR- OPEN;  Surgeon: Selvin Peña M.D.;  Location: SURGERY [facility];  Service:     HERNIA REPAIR, INCISIONAL, UNILATERAL Left 11/2014    and by jermaine scar    COLONOSCOPY  2014    PROSTATECTOMY, RADIAL  10/2013    robotic for cancer    JERMAINE BY LAPAROSCOPY  04/2010    SEPTOPLASTY  1997    DENTAL EXTRACTION(S)  1971    TONSILLECTOMY  1959        FAMILY HISTORY  Family History   Problem Relation Age of Onset    Hypertension Mother     Lung Disease Father     Cancer Father     Prostate cancer Father     Thyroid Father     Hypertension Father     Allergies Father     Arthritis Father        SOCIAL HISTORY   reports that he has never smoked. He has never used smokeless tobacco. He reports current alcohol use of about 0.6 oz of alcohol per week. He reports current drug use. Drug: Oral.    CURRENT MEDICATIONS  Discharge Medication List as of 3/30/2025  6:40 PM        CONTINUE these medications which have NOT CHANGED    Details   predniSONE (DELTASONE) 5 MG Tab Take 5 mg by mouth 2 times a day., Historical Med      hydrochlorothiazide (MICROZIDE) 12.5 MG capsule Take 12.5 mg by mouth every day., Historical Med      UBRELVY 50 MG Tab Take 50 mg by mouth 1 time a day as needed., [facility], Historical Med      leuprolide (LUPRON) 22.5 MG Kit Inject 22.5 mg into the shoulder, thigh, or buttocks one time. [facility], Historical Med      Abiraterone Acetate 250 MG Tab Take 1,000 mg by mouth every day., Historical Med      famotidine (PEPCID) 20 MG Tab Take 20 mg by mouth every bedtime., Historical Med      enalapril (VASOTEC) 10 MG TABS TAKE 1 TAB BY MOUTH 2 TIMES A DAY., Disp-30 Each, R-0, Normal             ALLERGIES  Oxycodone    PHYSICAL EXAM  BP (!) 153/90   Pulse 87   Temp 35.8 °C (96.5 °F) (Temporal)    "Resp 16   Ht 1.778 m (5' 10\")   Wt 88.5 kg (195 lb)   SpO2 98%    Constitutional: Nontoxic appearing. Alert in no apparent distress.  HENT: Normocephalic, Atraumatic. Bilateral external ears normal. Nose normal.  Moist mucous membranes.  Oropharynx clear.  Eyes: Pupils are equal and reactive. Conjunctiva normal.   Neck: Supple, full range of motion  Heart: Regular rate and rhythm.  Murmur noted.    Lungs: No respiratory distress, normal work of breathing. Lungs clear to auscultation bilaterally.  Abdomen Soft, no distention.  No tenderness to palpation.  Musculoskeletal: Atraumatic. No obvious deformities noted.  No lower extremity edema.  Skin: Warm, Dry.  No erythema, No rash.   Neurologic: .Alert and oriented x3.  Cranial nerves II-XII intact.  Strength 5/5 and sensation intact throughout all 4 extremities, other than mild weakness in left ankle flexion that is chronic.  No pronator drift.  No dysmetria.  Normal speech. Normal gait.  Psychiatric: Affect normal, Mood normal, Appears appropriate and not intoxicated.     DIAGNOSTIC STUDIES / PROCEDURES    EKG  I have independently interpreted this EKG  Results for orders placed or performed during the hospital encounter of 25   EKG   Result Value Ref Range    Report       [facility] Emergency Dept.    Test Date:  2025  Pt Name:    JOSEY JURADO              Department: ER  MRN:        [id number]                      Room:  Gender:     Male                         Technician: [id number]  :        1955                   Requested By:ER TRIAGE PROTOCOL  Order #:    [id number]                    Reading MD: Jazmine Simons MD    Measurements  Intervals                                Axis  Rate:       86                           P:          68  WY:         138                          QRS:        119  QRSD:       99                           T:          34  QT:         367  QTc:        439    Interpretive Statements  Sinus " rhythm  Left posterior fascicular block  Q waves isolated to lead 3  No acute ST or T wave change  Compared to ECG 06/09/2023 07:13:43  No significant change from prior  Electronically Signed On 03- 16:21:54 PDT by Jazmine Simons MD         LABS  Labs Reviewed   CBC WITH DIFFERENTIAL - Abnormal; Notable for the following components:       Result Value    WBC 12.2 (*)     RBC 4.56 (*)     Hemoglobin 13.8 (*)     Hematocrit 39.8 (*)     MPV 8.5 (*)     Neutrophils-Polys 80.50 (*)     Lymphocytes 8.20 (*)     Neutrophils (Absolute) 9.86 (*)     Monos (Absolute) 1.00 (*)     All other components within normal limits   COMP METABOLIC PANEL - Abnormal; Notable for the following components:    Glucose 119 (*)     All other components within normal limits   PROBRAIN NATRIURETIC PEPTIDE, NT   TROPONIN   TROPONIN   ESTIMATED GFR         RADIOLOGY  I have independently interpreted the diagnostic imaging associated with this visit and am waiting the final reading from the radiologist.   My preliminary interpretation is as follows: no edema    Radiologist interpretation:  CT-HEAD W/O   Final Result      1.  Chronic microvascular ischemic type changes.   2.  No acute intracranial abnormality.               DX-CHEST-PORTABLE (1 VIEW)   Final Result      No acute cardiopulmonary abnormality.            COURSE & MEDICAL DECISION MAKING    3:39 PM - Patient seen and examined at bedside. Patient arrives today with his wife for multiple complaints, including frequent migraine like headaches, intermittent dizziness, near syncope, and visual changes. Discussed plan of care, including evaluation via lab work and imaging. I explained that his returned studies are reassuring so far, but not all orders have returned. Patient does not want medication for his headache at this time. Patient agrees to the plan of care. The patient will be medicated with fluids. Ordered for EKG, Troponin now, BNP, CMP, CBC with differential, DX-Chest, and  CT-Head without to evaluate his symptoms.        ASSESSMENT, COURSE AND PLAN  Care Narrative: Patient with history of known severe mitral regurgitation as well as chronic migraines who presents with 4 separate episodes of near syncope which have occurred over the last week.  He is also noting an increasing frequency of his chronic migraines and possibly some associated vision changes.  The patient is overall well-appearing on arrival with reassuring vital signs other than mild hypertension.   He has no focal neurologic deficits on exam concerning for stroke.  Symptoms seem more consistent with near syncope and lightheadedness than vertigo that could be concerning for posterior circulation stroke.  Imaging was performed without evidence of acute intracranial abnormality such as hemorrhage or mass.  No concern for subarachnoid hemorrhage or meningitis at this time, headaches seem consistent with chronic migraines    His EKG does not show evidence of acute ischemia or arrhythmia.  Troponin is normal without concern for ACS.  BNP is normal and chest x-ray does not show evidence of pulmonary edema or signs of volume overload.  Labs are reassuring other than mild nonspecific leukocytosis of 12.  He has no renal dysfunction or electrolyte abnormality.  Patient is under a notable amount of stress at work and this could be contributing to his symptoms.    5:39 PM - I discussed the patient's case and the above findings with Dr. Mercado (Cardiology) who believes the patient can be discharged and will set him up with a cardiac event tracker tomorrow. He will move his LAKIA study to this Wednesday and follow the patient closely for further investigation of ongoing valve issues.    6:50 PM - Upon reassessment, patient is resting comfortably with normal vital signs.  No new complaints at this time.  Discussed results with patient and/or family as well as importance of primary care follow up.  Patient understands plan of care and  strict return precautions for new or changing symptoms.    ADDITIONAL PROBLEM LIST  Problem #1: Near syncope -workup here reassuring, discussed with cardiology who will plan for outpatient cardiac event monitor, patient has upcoming LAKIA for further evaluation of valve issues    Problem #2: Chronic headaches -seem consistent with chronic migraines, imaging performed here reassuring, referral placed to neurology for further outpatient management    Problem #3: Hypertension - discussed importance of home monitoring and outpatient follow up with primary provider    DISPOSITION AND DISCUSSIONS  I have discussed management of the patient with the following physicians and UZIEL's:    Dr. Mercado (Cardiology)       Escalation of care considered, and ultimately not performed:acute inpatient care management, however at this time, the patient is most appropriate for outpatient management       The patient will return for new or worsening symptoms and is stable at the time of discharge.    DISPOSITION:  Patient will be discharged home in stable condition.    FOLLOW UP:  Lon Mercado D.O.  [address]  [address]  [facility] [id number] [phone]    Schedule an appointment as soon as possible for a visit       [facility] Neurology  [address] [place] [address], [address]  [facility] [address] [phone]  Schedule an appointment as soon as possible for a visit       [facility], Emergency Dept  [address] [facility] [address] [phone]    If symptoms worsen    FINAL DIAGNOSIS  1. Near syncope    2. Chronic intractable headache, unspecified headache type        The note accurately reflects work and decisions made by me.  Jazmine Simons M.D.  3/31/2025  12:11 AM     Jackson MAGUIRE), am scribing for, and in the presence of, Jazmine Simons M.D..    Electronically signed by: Jackson Shay), 3/30/2025    Jazmine MAGUIRE M.D. personally performed the services described in this  documentation, as scribed by Jackson Sultana in my presence, and it is both accurate and complete.

## 2025-03-31 ENCOUNTER — OFFICE VISIT (OUTPATIENT)
Dept: CARDIOLOGY | Facility: MEDICAL CENTER | Age: 70
End: 2025-03-31
Attending: INTERNAL MEDICINE
Payer: COMMERCIAL

## 2025-03-31 VITALS
HEART RATE: 89 BPM | WEIGHT: 196 LBS | HEIGHT: 70 IN | BODY MASS INDEX: 28.06 KG/M2 | DIASTOLIC BLOOD PRESSURE: 70 MMHG | SYSTOLIC BLOOD PRESSURE: 122 MMHG | OXYGEN SATURATION: 95 %

## 2025-03-31 DIAGNOSIS — I34.0 MITRAL VALVE INSUFFICIENCY, UNSPECIFIED ETIOLOGY: ICD-10-CM

## 2025-03-31 LAB — EKG IMPRESSION: NORMAL

## 2025-03-31 PROCEDURE — 93005 ELECTROCARDIOGRAM TRACING: CPT | Mod: TC | Performed by: NURSE PRACTITIONER

## 2025-03-31 PROCEDURE — 99212 OFFICE O/P EST SF 10 MIN: CPT | Performed by: INTERNAL MEDICINE

## 2025-03-31 RX ORDER — ALENDRONATE SODIUM 70 MG/1
70 TABLET ORAL
COMMUNITY
Start: 2025-03-31

## 2025-03-31 ASSESSMENT — FIBROSIS 4 INDEX: FIB4 SCORE: 1.64

## 2025-03-31 NOTE — ED NOTES
Pt resting in room. Vs stable. No acute distress noted. Cardiac monitor on. NSR noted. Call light in place. Will continue to monitor.

## 2025-03-31 NOTE — PROGRESS NOTES
3/31/2025  [id number]  Brian Haynes    Patient should not have been scheduled today as he had no new symptoms from ED yesterday and has not been able to get any of the testing done in order for him to be treated. Explained the testing and got him scheduled appropriately with proper follow up with Dr. Mercado.

## 2025-03-31 NOTE — DISCHARGE INSTRUCTIONS
You were seen in the Emergency Department for headache and near syncope.    EKG, labs, chest xray, CT scan were completed without significant acute abnormalities.    Please continue home medications.    Your cardiologist should be reaching out to you to have some further studies this week.  Please make sure you are staying adequately hydrated and keep an eye on your blood pressure at home.  Referral to neurology was placed due to your chronic headaches.    Return to the Emergency Department with chest pain, severe lightheadedness or fainting, shortness of breath, neurologic changes, or other concerns.

## 2025-03-31 NOTE — ED NOTES
Pt verbalizes discharge instructions. Pt is able to safely ambulate around room and mixon and get self dressed. Pt has a ride home with family. Pt to follow up. Pt ready for dc.

## 2025-04-01 ENCOUNTER — TELEPHONE (OUTPATIENT)
Dept: CARDIOLOGY | Facility: MEDICAL CENTER | Age: 70
End: 2025-04-01
Payer: COMMERCIAL

## 2025-04-01 NOTE — TELEPHONE ENCOUNTER
Cyn Tuttle  You33 minutes ago (9:05 AM)     CG  corina Greenfield, what appt needs scheduled? Looks like pt has appt for heart monitor 04/04.      You routed conversation to [facility] Schedulers Pool37 minutes ago (9:01 AM)     You37 minutes ago (9:01 AM)     ET  To : Please see BD's message below. Per BD Ziopatch XT placed on Monday or Tuesday. Thank you.          Note        Deann Elaine Med Ass't49 minutes ago (8:49 AM)     DS  Thank you Ethyl, these requets should always go to scheduling. I see he has an appt on 04/04/25.     You routed conversation to Deann Elaine Med Ass't1 hour ago (8:25 AM)     You1 hour ago (8:23 AM)     ET  To Deann/Asha: Please see BD's message below.Thank you.          Note          Noted. Thank you.

## 2025-04-01 NOTE — TELEPHONE ENCOUNTER
----- Message from Physician Lon Mercado D.O. sent at 3/30/2025  5:39 PM PDT -----  Ethyl-    Need to move his LAKIA up from his late April appointment.  I think that I am ADR this week and would be happy to have him added to my schedule.  He can be done without anesthesia, just moderate sedation.    Also, please have a Ziopatch XT placed on Monday or Tuesday.    BD

## 2025-04-01 NOTE — TELEPHONE ENCOUNTER
Caller: Brian Haynes      Topic/issue: Patient calling for an update on this note. Looks like we have been discussing the Zio patch, but patient is more concerned about the first part of BD's message and would like to know if we are able to move the LAKIA up to this week? Please advise.     Callback Number: [phone]    Thank you,  Hyun PULIDO

## 2025-04-01 NOTE — TELEPHONE ENCOUNTER
Marybeth Lew  You5 minutes ago (2:45 PM)     LM  I dont have anything sooner at this time.      Hyun Hope  You; Marybeth Adlers1 hour ago (1:03 PM)     AS  Wanted to loop both of you in on this message since I know Marybeth normally schedules the LAKIA. Patient saw these messages on Mychart and called to clarify that the LAKIA is of higher priority than the Zio.       To BD: No sooner appointment for LAKIA per Marybeth. Pt is already scheduled for Zio on 04/04/25. Thank you.

## 2025-04-02 ENCOUNTER — PRE-ADMISSION TESTING (OUTPATIENT)
Dept: ADMISSIONS | Facility: MEDICAL CENTER | Age: 70
End: 2025-04-02
Attending: INTERNAL MEDICINE
Payer: COMMERCIAL

## 2025-04-02 NOTE — PREADMIT AVS NOTE
Current Medications   Medication Instructions    alendronate (FOSAMAX) 70 MG Tab Hold medication day of procedure    predniSONE (DELTASONE) 5 MG Tab Continue medication as prescribed    hydrochlorothiazide (MICROZIDE) 12.5 MG capsule Hold medication day of procedure    UBRELVY 50 MG Tab Hold medication day of procedure    leuprolide (LUPRON) 22.5 MG Kit Follow instructions from surgeon or specialist.    Abiraterone Acetate 250 MG Tab Follow instructions from surgeon or specialist.    famotidine (PEPCID) 20 MG Tab Continue medication as prescribed    enalapril (VASOTEC) 10 MG TABS Stop 24 hours before surgery

## 2025-04-04 ENCOUNTER — NON-PROVIDER VISIT (OUTPATIENT)
Dept: CARDIOLOGY | Facility: MEDICAL CENTER | Age: 70
End: 2025-04-04
Attending: INTERNAL MEDICINE
Payer: COMMERCIAL

## 2025-04-04 ENCOUNTER — TELEPHONE (OUTPATIENT)
Dept: CARDIOLOGY | Facility: MEDICAL CENTER | Age: 70
End: 2025-04-04
Payer: COMMERCIAL

## 2025-04-04 DIAGNOSIS — R55 POSTURAL DIZZINESS WITH PRESYNCOPE: ICD-10-CM

## 2025-04-04 DIAGNOSIS — R42 POSTURAL DIZZINESS WITH PRESYNCOPE: ICD-10-CM

## 2025-04-04 PROCEDURE — 93246 EXT ECG>7D<15D RECORDING: CPT

## 2025-04-04 NOTE — TELEPHONE ENCOUNTER
----- Message from Tech Deann S sent at 4/4/2025  4:22 PM PDT -----  Regarding: Note for work  Just placed 14 day Zio for patient, he decided not safe for him to drive to [place] for work until this dizziness and syncope are resolved. He will need a note for his employer. Thank you

## 2025-04-04 NOTE — TELEPHONE ENCOUNTER
Phone number called: [phone]    Call outcome: I called pt to get more information about the letter that he needs for his employer, such as how many days he will need to be out of work. I left pt a detailed message.

## 2025-04-04 NOTE — PROGRESS NOTES
Patient enrolled in the 14 day Zio Holter monitor per Lon Mercado MD.  In clinic hook up, monitor s/n [id number].  Pending EOS.

## 2025-04-07 NOTE — Clinical Note
REFERRAL APPROVAL NOTICE         Sent on April 7, 2025                   Ron Haynes  [address] [place] [address]                   Dear Mr. Haynes,    After a careful review of the medical information and benefit coverage, [facility] has processed your referral. See below for additional details.    If applicable, you must be actively enrolled with your insurance for coverage of the authorized service. If you have any questions regarding your coverage, please contact your insurance directly.    REFERRAL INFORMATION   Referral #:  [id number]  Referred-To Department    Referred-By Provider:  Neurology    Jazmine Simons M.D.   Neurology [facility]      [address] [facility]  [address]  [place] [address]  [phone] [address] [place] [address], [address]  [place] [id number] [phone]    Referral Start Date:  03/30/2025  Referral End Date:   03/30/2026           SCHEDULING  If you do not already have an appointment, please call [phone] to make an appointment.   MORE INFORMATION  As a reminder, [facility] ownership has changed, meaning this location is now owned and operated by [facility]. As such, we want to clarify that our patients should expect to receive two separate bills for the services received at [facility] - one representing the [facility] facility fees as the owner of the establishment, and the other to represent the physician's services and subsequent fees. You can speak with your insurance carrier for a pricing estimate by calling the customer service number on the back of your card and ask about charges for a hospital outpatient visit.  If you do not already have a [employer] account, sign up at: [email or web address]  You can access your medical information, make appointments, see lab results, billing information, and more.  If you have questions regarding this referral, please contact  the [facility] Referrals department at:             [phone]. Monday  - Friday 7:30AM - 5:00PM.      Sincerely,  [facility]

## 2025-04-08 ENCOUNTER — TELEPHONE (OUTPATIENT)
Dept: HEALTH INFORMATION MANAGEMENT | Facility: OTHER | Age: 70
End: 2025-04-08
Payer: COMMERCIAL

## 2025-04-21 ENCOUNTER — HOSPITAL ENCOUNTER (OUTPATIENT)
Facility: MEDICAL CENTER | Age: 70
End: 2025-04-21
Attending: INTERNAL MEDICINE | Admitting: INTERNAL MEDICINE
Payer: COMMERCIAL

## 2025-04-21 ENCOUNTER — ANESTHESIA (OUTPATIENT)
Dept: CARDIOLOGY | Facility: MEDICAL CENTER | Age: 70
End: 2025-04-21
Payer: COMMERCIAL

## 2025-04-21 ENCOUNTER — APPOINTMENT (OUTPATIENT)
Dept: CARDIOLOGY | Facility: MEDICAL CENTER | Age: 70
End: 2025-04-21
Attending: INTERNAL MEDICINE
Payer: COMMERCIAL

## 2025-04-21 ENCOUNTER — ANESTHESIA EVENT (OUTPATIENT)
Dept: CARDIOLOGY | Facility: MEDICAL CENTER | Age: 70
End: 2025-04-21
Payer: COMMERCIAL

## 2025-04-21 VITALS
TEMPERATURE: 97.3 F | HEIGHT: 70 IN | HEART RATE: 68 BPM | DIASTOLIC BLOOD PRESSURE: 73 MMHG | BODY MASS INDEX: 28.66 KG/M2 | SYSTOLIC BLOOD PRESSURE: 134 MMHG | OXYGEN SATURATION: 96 % | WEIGHT: 200.18 LBS | RESPIRATION RATE: 16 BRPM

## 2025-04-21 DIAGNOSIS — I34.0 MITRAL VALVE INSUFFICIENCY, UNSPECIFIED ETIOLOGY: ICD-10-CM

## 2025-04-21 LAB
ANION GAP SERPL CALC-SCNC: 13 MMOL/L (ref 7–16)
BUN SERPL-MCNC: 23 MG/DL (ref 8–22)
CALCIUM SERPL-MCNC: 8.9 MG/DL (ref 8.5–10.5)
CHLORIDE SERPL-SCNC: 106 MMOL/L (ref 96–112)
CO2 SERPL-SCNC: 21 MMOL/L (ref 20–33)
CREAT SERPL-MCNC: 0.87 MG/DL (ref 0.5–1.4)
GFR SERPLBLD CREATININE-BSD FMLA CKD-EPI: 94 ML/MIN/1.73 M 2
GLUCOSE SERPL-MCNC: 86 MG/DL (ref 65–99)
POTASSIUM SERPL-SCNC: 3.7 MMOL/L (ref 3.6–5.5)
SODIUM SERPL-SCNC: 140 MMOL/L (ref 135–145)

## 2025-04-21 PROCEDURE — 700101 HCHG RX REV CODE 250: Performed by: ANESTHESIOLOGY

## 2025-04-21 PROCEDURE — 80048 BASIC METABOLIC PNL TOTAL CA: CPT

## 2025-04-21 PROCEDURE — 700111 HCHG RX REV CODE 636 W/ 250 OVERRIDE (IP): Performed by: ANESTHESIOLOGY

## 2025-04-21 PROCEDURE — 160046 HCHG PACU - 1ST 60 MINS PHASE II

## 2025-04-21 PROCEDURE — 160035 HCHG PACU - 1ST 60 MINS PHASE I

## 2025-04-21 PROCEDURE — 93325 DOPPLER ECHO COLOR FLOW MAPG: CPT

## 2025-04-21 PROCEDURE — 700105 HCHG RX REV CODE 258: Performed by: ANESTHESIOLOGY

## 2025-04-21 PROCEDURE — 160002 HCHG RECOVERY MINUTES (STAT)

## 2025-04-21 RX ORDER — HALOPERIDOL 5 MG/ML
1 INJECTION INTRAMUSCULAR
Status: DISCONTINUED | OUTPATIENT
Start: 2025-04-21 | End: 2025-04-21 | Stop reason: HOSPADM

## 2025-04-21 RX ORDER — SODIUM CHLORIDE, SODIUM LACTATE, POTASSIUM CHLORIDE, CALCIUM CHLORIDE 600; 310; 30; 20 MG/100ML; MG/100ML; MG/100ML; MG/100ML
INJECTION, SOLUTION INTRAVENOUS
Status: DISCONTINUED | OUTPATIENT
Start: 2025-04-21 | End: 2025-04-21 | Stop reason: SURG

## 2025-04-21 RX ORDER — DIPHENHYDRAMINE HYDROCHLORIDE 50 MG/ML
12.5 INJECTION, SOLUTION INTRAMUSCULAR; INTRAVENOUS
Status: DISCONTINUED | OUTPATIENT
Start: 2025-04-21 | End: 2025-04-21 | Stop reason: HOSPADM

## 2025-04-21 RX ORDER — ONDANSETRON 2 MG/ML
4 INJECTION INTRAMUSCULAR; INTRAVENOUS
Status: DISCONTINUED | OUTPATIENT
Start: 2025-04-21 | End: 2025-04-21 | Stop reason: HOSPADM

## 2025-04-21 RX ORDER — LIDOCAINE HYDROCHLORIDE 20 MG/ML
INJECTION, SOLUTION EPIDURAL; INFILTRATION; INTRACAUDAL; PERINEURAL PRN
Status: DISCONTINUED | OUTPATIENT
Start: 2025-04-21 | End: 2025-04-21 | Stop reason: SURG

## 2025-04-21 RX ORDER — SODIUM CHLORIDE, SODIUM LACTATE, POTASSIUM CHLORIDE, CALCIUM CHLORIDE 600; 310; 30; 20 MG/100ML; MG/100ML; MG/100ML; MG/100ML
INJECTION, SOLUTION INTRAVENOUS CONTINUOUS
Status: DISCONTINUED | OUTPATIENT
Start: 2025-04-21 | End: 2025-04-21 | Stop reason: HOSPADM

## 2025-04-21 RX ADMIN — LIDOCAINE HYDROCHLORIDE 100 MG: 20 INJECTION, SOLUTION EPIDURAL; INFILTRATION; INTRACAUDAL; PERINEURAL at 11:00

## 2025-04-21 RX ADMIN — PROPOFOL 50 MG: 10 INJECTION, EMULSION INTRAVENOUS at 11:15

## 2025-04-21 RX ADMIN — SODIUM CHLORIDE, POTASSIUM CHLORIDE, SODIUM LACTATE AND CALCIUM CHLORIDE: 600; 310; 30; 20 INJECTION, SOLUTION INTRAVENOUS at 10:57

## 2025-04-21 RX ADMIN — PROPOFOL 50 MG: 10 INJECTION, EMULSION INTRAVENOUS at 11:05

## 2025-04-21 RX ADMIN — PROPOFOL 100 MG: 10 INJECTION, EMULSION INTRAVENOUS at 11:00

## 2025-04-21 RX ADMIN — PROPOFOL 50 MG: 10 INJECTION, EMULSION INTRAVENOUS at 11:10

## 2025-04-21 ASSESSMENT — PAIN SCALES - GENERAL: PAIN_LEVEL: 0

## 2025-04-21 ASSESSMENT — FIBROSIS 4 INDEX: FIB4 SCORE: 1.64

## 2025-04-21 ASSESSMENT — PAIN DESCRIPTION - PAIN TYPE
TYPE: SURGICAL PAIN
TYPE: SURGICAL PAIN

## 2025-04-21 NOTE — OR NURSING
1127 Arrived from Procedure room ID verified. Report received. Attached to monitors. 8L 02 mask respirations even and unlabored. Awake. Denies pain, denies nausea. Vss    1141 Wife at the bedside updated plan of care.     1154 discharge instructions given to patient and family verbalize understanding of the orders. Copy of instructions given to patients wife.     1200 Escorted via W/C to responsible adult with all personal belongings.

## 2025-04-21 NOTE — ANESTHESIA TIME REPORT
Anesthesia Start and Stop Event Times       Date Time Event    4/21/2025 1057 Anesthesia Start     1059 Ready for Procedure     1138 Anesthesia Stop          Responsible Staff  04/21/25      Name Role Begin End    Joe Butts M.D. Anesth 1057 1138          Overtime Reason:  no overtime (within assigned shift)    Comments:

## 2025-04-21 NOTE — ANESTHESIA POSTPROCEDURE EVALUATION
Patient: Brian Haynes    Procedure Summary       Date: 04/21/25 Room / Location: [facility] - Echocardiology [facility]    Anesthesia Start: 1057 Anesthesia Stop: 1138    Procedure: EC-LAKIA W/O CONT Diagnosis:       Mitral valve insufficiency, unspecified etiology      (Heart Murmur)    Scheduled Providers: Lon Mercado D.O.; Joe Butts M.D. Responsible Provider: Joe Butts M.D.    Anesthesia Type: MAC ASA Status: 2            Final Anesthesia Type: MAC  Last vitals  BP   Blood Pressure : 139/84    Temp   35.9 °C (96.7 °F)    Pulse   77   Resp   16    SpO2   (!) 75 %      Anesthesia Post Evaluation    Patient location during evaluation: PACU  Patient participation: complete - patient participated  Level of consciousness: awake and alert  Pain score: 0    Airway patency: patent  Anesthetic complications: no  Cardiovascular status: hemodynamically stable  Respiratory status: acceptable  Hydration status: euvolemic    PONV: none          There were no known notable events for this encounter.     Nurse Pain Score: 0 (NPRS)

## 2025-04-21 NOTE — PROCEDURES
Transesophageal echocardiogram    Indication for procedure: 69-year-old male being evaluated for mitral valve regurgitation    Preprocedural diagnosis: Moderate mitral valve regurgitation    Postprocedural diagnosis: Moderate eccentric posterior directed jet mitral valve regurgitation    Procedural details: Patient was identified in the procedural holding area at [facility].  Procedure alternatives risk and questions were answered to the best of the patient satisfaction informed verbal written consent was obtained.  Patient was then transported in stable nonsedated condition to the cardiac procedural suite.  Anesthesia was currently present and performed moderate sedation please see separately documented details for full medication administration.  A brief timeout was performed.  After appropriate sedation LAKIA probe was passed into the posterior oropharynx and advanced into the midesophagus.  Color-flow, 2D and Doppler assessment was performed.  There were no immediate complications.  Patient tolerated the procedure well    Findings  1.  Preserved LV systolic function with estimated ejection fraction visually of 55-60%  2.  Moderate eccentric posterior directed jet of mitral valve regurgitation without evidence of pulmonary vein reversal  3.  Trileaflet aortic valve with mild aortic valve insufficiency    Conclusions  1.  Moderate eccentric posterior directed jet of mitral valve regurgitation without evidence of pulmonary vein reversal    Recommendations  1.  Continue current medications  2.  Follow-up in cardiology department as scheduled  3.  Call with questions

## 2025-04-21 NOTE — H&P
HPI     69-year-old male with active prostate cancer on hormone deprivation therapy, hypertension, gastroesophageal reflux disease, prior Hashimoto's thyroiditis presents in clinic for ongoing management of his mitral valve regurgitation.     Currently, he notes feeling well but has developed intermittent dizziness and occasional dyspnea     Most recent cardiovascular workup consists of an echocardiogram performed on 3/11/2025 which demonstrated normal LV systolic function with preserved ejection fraction estimated at 60% with reported severe mitral valve regurgitation per report left atrium was normal in size.  I reviewed the echocardiogram and noted no significant pulmonary vein reversal and highly turbulent valvular color flow.    Past Medical History  Past Medical History        Past Medical History:   Diagnosis Date    Anesthesia       Pt reports small airway and difficulty intubating.    Arthritis       Osteo to knee and neck and fingers    Cancer (HCC) 10/2013     Prostate-prostatectomy and radiation x2(recently cyber knife 8/2018)    Dermatitis      Disorder of thyroid       Hashimotos (last blood test said no)-no current meds    Hayfever      Heart burn       Treated with pepcid.    High cholesterol      Hypertension      Indigestion       Treated with pepcid.    Migraines       3-4 per year    Right knee pain 12/05/2019         Past Surgical History  Past Surgical History         Past Surgical History:   Procedure Laterality Date    PB LAMINOTOMY,LUMBAR DISK,1 INTRSP N/A 6/9/2023     Procedure: POSTERIOR LUMBAR BILATERAL L4-5 MICRODISCECTOMY;  Surgeon: Vel Nichole M.D.;  Location: SURGERY [facility];  Service: Neurosurgery    PB PLASTY KNEE,MED OR LAT COMPARTMT Right 12/9/2019     Procedure: ARTHROPLASTY, KNEE, UNICOMPARTMENTAL - VERSUS;  Surgeon: Olman Baez M.D.;  Location: SURGERY [facility];  Service: Orthopedics    KNEE ARTHROSCOPY Right 9/13/2018     Procedure: KNEE ARTHROSCOPY;   Surgeon: Olman Baez M.D.;  Location: SURGERY [facility];  Service: Orthopedics    MENISCECTOMY, KNEE, MEDIAL Right 9/13/2018     Procedure: MEDIAL MENISCECTOMY - PARTIAL, KEITH;  Surgeon: Olman Baez M.D.;  Location: SURGERY [facility];  Service: Orthopedics    BLADDER NECK CONTRACTURE EXICISION   07/2018    INGUINAL HERNIA REPAIR Left 4/13/2017     Procedure: INGUINAL HERNIA REPAIR- OPEN;  Surgeon: Selvin Peña M.D.;  Location: SURGERY [facility];  Service:     HERNIA REPAIR, INCISIONAL, UNILATERAL Left 11/2014     and by hunter scar    COLONOSCOPY   2014    PROSTATECTOMY, RADIAL   10/2013     robotic for cancer    HUNTER BY LAPAROSCOPY   04/2010    SEPTOPLASTY   1997    DENTAL EXTRACTION(S)   1971    TONSILLECTOMY   1959         Social History  Social History               Socioeconomic History    Marital status:        Spouse name: Not on file    Number of children: Not on file    Years of education: Not on file    Highest education level: Not on file   Occupational History    Not on file   Tobacco Use    Smoking status: Never    Smokeless tobacco: Never   Vaping Use    Vaping status: Never Used   Substance and Sexual Activity    Alcohol use: Yes       Alcohol/week: 0.6 oz       Types: 1 Standard drinks or equivalent per week       Comment: 1 per week    Drug use: Yes       Types: Oral       Comment: gummies  once every 3 months for sleep - last used 3/2023    Sexual activity: Yes       Partners: Female   Other Topics Concern    Not on file   Social History Narrative    Not on file      Social Drivers of Health      Financial Resource Strain: Not on file   Food Insecurity: Not on file   Transportation Needs: Not on file   Physical Activity: Not on file   Stress: Not on file   Social Connections: Not on file   Intimate Partner Violence: Not on file   Housing Stability: Not on file         Past Family History  Family History         Family History   Problem Relation Age of  Onset    Hypertension Mother      Lung Disease Father      Cancer Father      Prostate cancer Father      Thyroid Father      Hypertension Father      Allergies Father      Arthritis Father           Medication(s)       Current Outpatient Medications:        hydrochlorothiazide, , Taking    predniSONE, TAKE ONE TABLET BY MOUTH ONE TIME DAILY for 5 days (Patient taking differently: 5mg twice a day), Taking Differently    Ubrelvy, TAKE ONE TABLET BY MOUTH DAILY AS NEEDED for headaches, PRN    leuprolide, 22.5 mg, Intramuscular, Once, Taking    Abiraterone Acetate, 1,000 mg, Oral, QDAY, Taking    famotidine, 20 mg, Oral, QHS, Taking    enalapril, TAKE 1 TAB BY MOUTH 2 TIMES A DAY., Taking    promethazine, , Unknown    rosuvastatin, , Unknown    sulfaSALAzine, , Unknown     Allergies  Oxycodone     Review of Systems     A comprehensive 10 system review was conducted and is negative except as noted above in the HPI or here.        Vital Signs  VSS    Physical Exam  Constitutional:       Appearance: Normal appearance. He is obese.   HENT:      Head: Normocephalic and atraumatic.      Mouth/Throat:      Mouth: Mucous membranes are moist.      Pharynx: Oropharynx is clear.   Eyes:      Extraocular Movements: Extraocular movements intact.      Conjunctiva/sclera: Conjunctivae normal.   Cardiovascular:      Rate and Rhythm: Normal rate and regular rhythm.      Pulses: Normal pulses.      Heart sounds: Murmur (2 out of 6 high-pitched, mid peaking left midsternal murmur) heard.      No friction rub. No gallop.   Pulmonary:      Effort: Pulmonary effort is normal.      Breath sounds: Normal breath sounds.   Abdominal:      General: Bowel sounds are normal.      Palpations: Abdomen is soft.   Musculoskeletal:         General: Normal range of motion.      Cervical back: Normal range of motion and neck supple.      Right lower leg: No edema.      Left lower leg: No edema.   Skin:     General: Skin is warm and dry.   Neurological:       General: No focal deficit present.      Mental Status: He is alert and oriented to person, place, and time. Mental status is at baseline.   Psychiatric:         Mood and Affect: Mood normal.         Behavior: Behavior normal.         Thought Content: Thought content normal.         Judgment: Judgment normal.       A/P  Progressive mitral valve regurgitation   -will plan on LAKIA for further evaluation and planning regarding ongoing management.

## 2025-04-21 NOTE — ANESTHESIA PREPROCEDURE EVALUATION
Date/Time: 04/21/25 1000    Scheduled providers: Lon Mercado D.O.; Joe Butts M.D.    Procedure: EC-LAKIA W/O CONT    Diagnosis: Mitral valve insufficiency, unspecified etiology [I34.0]    Indications: Heart Murmur    Location: [facility] - Echocardiology [facility]            Relevant Problems   CARDIAC   (positive) Hypertension   (positive) Mitral valve insufficiency   (positive) RBBB      Other   (positive) Localized osteoarthritis of right knee   (positive) Secondary malignant neoplasm of lymph nodes (HCC)       Physical Exam    Airway   Mallampati: II  TM distance: >3 FB  Neck ROM: full       Cardiovascular - normal exam  Rhythm: regular  Rate: normal  (-) murmur     Dental - normal exam           Pulmonary - normal exam  Breath sounds clear to auscultation     Abdominal    Neurological - normal exam                   Anesthesia Plan    ASA 2       Plan - MAC               Induction: intravenous    Postoperative Plan: Postoperative administration of opioids is intended.    Pertinent diagnostic labs and testing reviewed    Informed Consent:    Anesthetic plan and risks discussed with patient.    Use of blood products discussed with: patient whom consented to blood products.

## 2025-04-21 NOTE — DISCHARGE INSTRUCTIONS
Discharge Instructions:  Transesophageal Echocardiogram (LAKIA)    LAKIA is a test that uses sound waves to take pictures of your heart. LAKIA is done by passing a small probe attached to a flexible tube down the part of the body that moves food from your mouth to your stomach (esophagus).    The pictures give clear images of your heart. This can help your doctor see if there are problems with your heart.   General instructions    Follow instructions from your doctor about what you cannot eat or drink.   You will take out any dentures or dental retainers.   Plan to have a responsible adult take you home from the hospital or clinic.   Plan to have a responsible adult care for you for the time you are told after you leave the hospital or clinic. This is important.  What can I expect after the procedure?    You will be monitored until you leave the hospital or clinic. This includes checking your blood pressure, heart rate, breathing rate, and blood oxygen level.   Your throat may feel sore and numb. This will get better over time. You will not be allowed to eat or drink until the numbness has gone away.   It is common to have a sore throat for a day or two.   It is up to you to get the results of your procedure. Ask how to get your results when they are ready.   Pink tinge sputum is common after your procedure  Follow these instructions at home:    If you were given a sedative during your procedure, do not drive or use machines until your doctor says that it is safe.   Return to your normal activities when your doctor says that it is safe.   Keep all follow-up visits.  Go to ER / call 911:   If you cough up bright red blood or vomit blood   If you have severe pain in throat/stomach.   If you have difficulty breathing that does not go away with rest  Summary    LAKIA is a test that uses sound waves to take pictures of your heart.   You will be given a medicine to help you relax.   Pink tinge sputum is common after your  procedure   What to Expect Post Anesthesia    Rest and take it easy for the first 24 hours.  A responsible adult is recommended to remain with you during that time.  It is normal to feel sleepy.  We encourage you to not do anything that requires balance, judgment or coordination.    FOR 24 HOURS DO NOT:  Drive, operate machinery or run household appliances.  Drink beer or alcoholic beverages.  Make important decisions or sign legal documents.    To avoid nausea, slowly advance diet as tolerated, avoiding spicy or greasy foods for the first day.  Add more substantial food to your diet according to your provider's instructions.  Babies can be fed formula or breast milk as soon as they are hungry.  INCREASE FLUIDS AND FIBER TO AVOID CONSTIPATION.    MILD FLU-LIKE SYMPTOMS ARE NORMAL.  YOU MAY EXPERIENCE GENERALIZED MUSCLE ACHES, THROAT IRRITATION, HEADACHE AND/OR SOME NAUSEA.

## 2025-04-22 ENCOUNTER — RESULTS FOLLOW-UP (OUTPATIENT)
Dept: CARDIOLOGY | Facility: MEDICAL CENTER | Age: 70
End: 2025-04-22

## 2025-04-23 ENCOUNTER — TELEPHONE (OUTPATIENT)
Dept: CARDIOLOGY | Facility: MEDICAL CENTER | Age: 70
End: 2025-04-23
Payer: COMMERCIAL

## 2025-04-28 ENCOUNTER — OFFICE VISIT (OUTPATIENT)
Dept: CARDIOLOGY | Facility: MEDICAL CENTER | Age: 70
End: 2025-04-28
Attending: INTERNAL MEDICINE
Payer: COMMERCIAL

## 2025-04-28 VITALS
SYSTOLIC BLOOD PRESSURE: 110 MMHG | DIASTOLIC BLOOD PRESSURE: 70 MMHG | WEIGHT: 198.8 LBS | HEART RATE: 88 BPM | RESPIRATION RATE: 16 BRPM | HEIGHT: 70 IN | BODY MASS INDEX: 28.46 KG/M2 | OXYGEN SATURATION: 93 %

## 2025-04-28 DIAGNOSIS — I10 HYPERTENSION, UNSPECIFIED TYPE: Chronic | ICD-10-CM

## 2025-04-28 DIAGNOSIS — E78.5 HYPERLIPIDEMIA, UNSPECIFIED HYPERLIPIDEMIA TYPE: Chronic | ICD-10-CM

## 2025-04-28 DIAGNOSIS — I34.0 NONRHEUMATIC MITRAL VALVE REGURGITATION: ICD-10-CM

## 2025-04-28 PROCEDURE — 99212 OFFICE O/P EST SF 10 MIN: CPT | Performed by: INTERNAL MEDICINE

## 2025-04-28 ASSESSMENT — FIBROSIS 4 INDEX: FIB4 SCORE: 1.64

## 2025-04-28 NOTE — PROGRESS NOTES
[facility]    PatientName:Brian SegundocDate: 2025  :1955    69 y.o.PCP:Sky MATSON M.D.  MRN:[id number]        Problems and Plans    Mitral valve insufficiency  Clinically, he is doing fair and at this time I have a lower clinical suspicion that he is having valvular heart disease at this time.  At this time we will continue with ongoing annual echocardiogram and continue to monitor clinically    Hypertension  Blood pressures currently well-controlled on current medications.  No changes are made at this time    Hyperlipidemia  Regarding his dyslipidemia he will continue with ongoing statin therapy and integration of a healthy lifestyle    Return in about 6 months (around 10/28/2025).      Encounter    Reason for Visit / Chief Complaint: Mitral valve regurgitation    HPI    69-year-old male with active prostate cancer on hormone deprivation therapy, hypertension, gastroesophageal reflux disease, prior Hashimoto's thyroiditis presents in clinic for ongoing management of his mitral valve regurgitation recent cardiovascular workup.    Currently, he notes he is feeling fair and able to do the majority of his desired activities.  He is accompanied by his wife who reiterates similar history however does note that he is showing some signs of easier fatigue but he has been walking for exercise on a daily basis not having significant limitations.  He questions whether or not this may be related to his ongoing active prostate cancer treatment..    Most recent cardiovascular workup consists of a transesophageal echocardiogram performed on 2025 which demonstrated moderately eccentric posterior directed jet of mitral valve regurgitation without evidence of pulmonary vein reversal.  Past Medical History  Past Medical History:   Diagnosis Date    Anesthesia     Pt reports small airway and difficulty intubating.    Arthritis     Osteo to knee and neck and fingers     Cancer (HCC) 10/2013    Prostate-prostatectomy and radiation x2(recently cyber knife 8/2018)    Dermatitis     Disorder of thyroid     Hashimotos (last blood test said no)-no current meds    Hard to intubate 2013    Told i have smaller throat opening    Hayfever     Heart burn     Treated with pepcid.    Heart murmur 2025    Heart valve disease 2025    Mitral Regurgitation    High cholesterol     not medicated    Hypertension     Indigestion     Treated with pepcid.    Migraines     3-4 per year    Right knee pain 12/05/2019    Snoring     Urinary bladder disorder 2018    Radiation 2 times     Past Surgical History  Past Surgical History:   Procedure Laterality Date    PB LAMINOTOMY,LUMBAR DISK,1 INTRSP N/A 06/09/2023    Procedure: POSTERIOR LUMBAR BILATERAL L4-5 MICRODISCECTOMY;  Surgeon: Vel Nichole M.D.;  Location: [facility];  Service: Neurosurgery    PB PLASTY KNEE,MED OR LAT COMPARTMT Right 12/09/2019    Procedure: ARTHROPLASTY, KNEE, UNICOMPARTMENTAL - VERSUS;  Surgeon: Olman Baez M.D.;  Location: [facility];  Service: Orthopedics    KNEE ARTHROSCOPY Right 09/13/2018    Procedure: KNEE ARTHROSCOPY;  Surgeon: Olman Baez M.D.;  Location: [facility];  Service: Orthopedics    MENISCECTOMY, KNEE, MEDIAL Right 09/13/2018    Procedure: MEDIAL MENISCECTOMY - PARTIAL, KEITH;  Surgeon: Olman Beaz M.D.;  Location: [facility];  Service: Orthopedics    BLADDER NECK CONTRACTURE EXICISION  07/2018    INGUINAL HERNIA REPAIR Left 04/13/2017    Procedure: INGUINAL HERNIA REPAIR- OPEN;  Surgeon: Selvin Peña M.D.;  Location: [facility];  Service:     HERNIA REPAIR, INCISIONAL, UNILATERAL Left 11/2014    and by jermaine scar    COLONOSCOPY  2014    PROSTATECTOMY, RADIAL  10/2013    robotic for cancer    JERMAINE BY LAPAROSCOPY  04/2010    SEPTOPLASTY  1997    DENTAL EXTRACTION(S)  1971    TONSILLECTOMY  1959    OTHER      OTHER  NEUROLOGICAL SURG       Social History  Social History     Socioeconomic History    Marital status:      Spouse name: Not on file    Number of children: Not on file    Years of education: Not on file    Highest education level: Not on file   Occupational History    Not on file   Tobacco Use    Smoking status: Never    Smokeless tobacco: Never   Vaping Use    Vaping status: Never Used   Substance and Sexual Activity    Alcohol use: Yes     Alcohol/week: 0.6 oz     Types: 1 Shots of liquor per week     Comment: 1 per week    Drug use: Yes     Types: Oral     Comment: gummies  once every 3 months for sleep - last used 3/2023    Sexual activity: Yes     Partners: Female   Other Topics Concern    Not on file   Social History Narrative    Not on file     Social Drivers of Health     Financial Resource Strain: Not on file   Food Insecurity: Not on file   Transportation Needs: Not on file   Physical Activity: Not on file   Stress: Not on file   Social Connections: Not on file   Intimate Partner Violence: Not on file   Housing Stability: Not on file     Past Family History  Family History   Problem Relation Age of Onset    Hypertension Mother     Dementia Mother     Lung Disease Father     Cancer Father         Prostate    Prostate cancer Father     Thyroid Father     Hypertension Father     Allergies Father     Arthritis Father     Cancer Brother         Prostate     Medication(s)    Current Outpatient Medications:     alendronate, 70 mg, Oral, Q7 DAYS, Taking    predniSONE, 5 mg, Oral, BID, Taking    hydrochlorothiazide, 12.5 mg, Oral, DAILY, Taking    Ubrelvy, 50 mg, Oral, QDAY PRN, Taking    leuprolide, 22.5 mg, Intramuscular, Once, Taking    Abiraterone Acetate, 1,000 mg, Oral, QDAY, Taking    famotidine, 20 mg, Oral, QHS, Taking    enalapril, TAKE 1 TAB BY MOUTH 2 TIMES A DAY. (Patient taking differently: 10 mg, Oral, 2 TIMES DAILY), Taking  Allergies  Patient has no known allergies.    Review of Systems    A  "comprehensive 10 system review was conducted and is negative except as noted above in the HPI or here.      Vital Signs  /70 (BP Location: Right arm, Patient Position: Sitting, BP Cuff Size: Adult)   Pulse 88   Resp 16   Ht 1.778 m (5' 10\")   Wt 90.2 kg (198 lb 12.8 oz)   SpO2 93%   BMI 28.52 kg/m²     Physical Exam  Constitutional:       Appearance: Normal appearance. He is obese.   HENT:      Head: Normocephalic and atraumatic.      Mouth/Throat:      Mouth: Mucous membranes are moist.      Pharynx: Oropharynx is clear.   Eyes:      Extraocular Movements: Extraocular movements intact.      Conjunctiva/sclera: Conjunctivae normal.   Cardiovascular:      Rate and Rhythm: Normal rate and regular rhythm.      Pulses: Normal pulses.      Heart sounds: Murmur (2 out of 6 apical systolic murmur with radiation to left axilla) heard.      No friction rub. No gallop.   Pulmonary:      Effort: Pulmonary effort is normal.      Breath sounds: Normal breath sounds.   Abdominal:      General: Bowel sounds are normal.      Palpations: Abdomen is soft.   Musculoskeletal:         General: Normal range of motion.      Cervical back: Normal range of motion and neck supple.   Skin:     General: Skin is warm and dry.   Neurological:      General: No focal deficit present.      Mental Status: He is alert and oriented to person, place, and time. Mental status is at baseline.   Psychiatric:         Mood and Affect: Mood normal.         Behavior: Behavior normal.         Thought Content: Thought content normal.         Judgment: Judgment normal.         Lab Results   Component Value Date/Time    TSHULTRASEN 0.460 12/22/2017 1100      No results found for: \"FREET4\"   No results found for: \"HBA1C\"    Lab Results   Component Value Date/Time    CHOLSTRLTOT 160 04/09/2007 05:20 AM     (H) 04/09/2007 05:20 AM    HDL 48 04/09/2007 05:20 AM    TRIGLYCERIDE 51 04/09/2007 05:20 AM         Lab Results   Component Value Date/Time    " SODIUM 140 04/21/2025 08:40 AM    POTASSIUM 3.7 04/21/2025 08:40 AM    CHLORIDE 106 04/21/2025 08:40 AM    CO2 21 04/21/2025 08:40 AM    GLUCOSE 86 04/21/2025 08:40 AM    BUN 23 (H) 04/21/2025 08:40 AM    CREATININE 0.87 04/21/2025 08:40 AM    CREATININE 1.0 04/13/2007 05:39 AM       Lab Results   Component Value Date/Time    ALKPHOSPHAT 86 03/30/2025 03:00 PM    ASTSGOT 25 03/30/2025 03:00 PM    ALTSGPT 19 03/30/2025 03:00 PM    TBILIRUBIN <0.2 03/30/2025 03:00 PM           Total patient time was estimated to be 30 minutes consisting of chart review, direct patient interaction, medication renewal, plan development and overall communication with the cardiovascular team.        Electronically signed by:   Lon Mercado DO, MPH  [facility]    Portions of this note were completed using voice recognition software (Dragon Naturally speaking software) . Occasional transcription errors may have escaped proof reading. I have made every reasonable attempt to correct obvious errors, but I expect that there are errors of grammar and possibly content that I did not discover before finalizing the note.

## 2025-04-29 NOTE — ASSESSMENT & PLAN NOTE
Clinically, he is doing fair and at this time I have a lower clinical suspicion that he is having valvular heart disease at this time.  At this time we will continue with ongoing annual echocardiogram and continue to monitor clinically

## 2025-07-01 ENCOUNTER — HOSPITAL ENCOUNTER (OUTPATIENT)
Dept: RADIOLOGY | Facility: MEDICAL CENTER | Age: 70
End: 2025-07-01
Payer: MEDICARE

## 2025-07-01 ENCOUNTER — HOSPITAL ENCOUNTER (OUTPATIENT)
Dept: RADIOLOGY | Facility: MEDICAL CENTER | Age: 70
End: 2025-07-01
Attending: UROLOGY
Payer: MEDICARE

## 2025-07-01 DIAGNOSIS — M54.2 CERVICALGIA: ICD-10-CM

## 2025-07-01 DIAGNOSIS — G43.109 MIGRAINE WITH AURA AND WITHOUT STATUS MIGRAINOSUS, NOT INTRACTABLE: ICD-10-CM

## 2025-07-01 DIAGNOSIS — H81.22 VESTIBULAR NEURONITIS OF LEFT EAR: ICD-10-CM

## 2025-07-01 DIAGNOSIS — M54.50 LUMBAR PAIN: ICD-10-CM

## 2025-07-01 DIAGNOSIS — H81.392 OTHER PERIPHERAL VERTIGO, LEFT EAR: ICD-10-CM

## 2025-07-01 DIAGNOSIS — F40.240 CLAUSTROPHOBIA: ICD-10-CM

## 2025-07-01 DIAGNOSIS — R42 DIZZINESS AND GIDDINESS: ICD-10-CM

## 2025-07-01 PROCEDURE — A9579 GAD-BASE MR CONTRAST NOS,1ML: HCPCS | Mod: JZ

## 2025-07-01 PROCEDURE — 72148 MRI LUMBAR SPINE W/O DYE: CPT

## 2025-07-01 PROCEDURE — 72141 MRI NECK SPINE W/O DYE: CPT

## 2025-07-01 PROCEDURE — 70553 MRI BRAIN STEM W/O & W/DYE: CPT

## 2025-07-01 PROCEDURE — 700117 HCHG RX CONTRAST REV CODE 255: Mod: JZ

## 2025-07-01 RX ORDER — GADOTERIDOL 279.3 MG/ML
20 INJECTION INTRAVENOUS ONCE
Status: COMPLETED | OUTPATIENT
Start: 2025-07-01 | End: 2025-07-01

## 2025-07-01 RX ADMIN — GADOTERIDOL 20 ML: 279.3 INJECTION, SOLUTION INTRAVENOUS at 15:36

## 2025-07-23 ENCOUNTER — TELEPHONE (OUTPATIENT)
Dept: HEALTH INFORMATION MANAGEMENT | Facility: OTHER | Age: 70
End: 2025-07-23
Payer: MEDICARE

## 2025-08-05 ENCOUNTER — APPOINTMENT (OUTPATIENT)
Facility: MEDICAL CENTER | Age: 70
End: 2025-08-05
Attending: PSYCHIATRY & NEUROLOGY
Payer: COMMERCIAL

## 2025-08-06 ENCOUNTER — APPOINTMENT (OUTPATIENT)
Dept: NEUROLOGY | Facility: MEDICAL CENTER | Age: 70
End: 2025-08-06
Attending: PSYCHIATRY & NEUROLOGY
Payer: MEDICARE

## (undated) DEVICE — TRAY SKIN SCRUB PVP WET (20EA/CA) PART #DYND70356 DISCONTINUED

## (undated) DEVICE — BANDAGE ROLL STERILE BULKEE 4.5 IN X 4 YD (100EA/CA)

## (undated) DEVICE — BANDAGE ELASTIC STERILE VELCRO 6 X 5 YDS (25EA/CA)

## (undated) DEVICE — MASK AIRWAY SIZE 4 UNIQUE SILICON (10EA/BX)

## (undated) DEVICE — SUTURE GENERAL

## (undated) DEVICE — TROCAR LAPSCP 100MM 12MM NTHRD - (6/BX)

## (undated) DEVICE — DRESSING TRANSPARENT FILM TEGADERM 4 X 4.75" (50EA/BX)"

## (undated) DEVICE — GOWN SURGEONS LARGE - (32/CA)

## (undated) DEVICE — DRAPE ARTHROSCOPE (ACL) - (10/CA)

## (undated) DEVICE — SUTURE 0 VICRYL PLUS CT-1 - 36 INCH (36/BX)

## (undated) DEVICE — PACK JACKSON TABLE KIT W/OUT - HR (6EA/CA)

## (undated) DEVICE — SPONGE GAUZE STER 4X4 8-PL - (2/PK 50PK/BX 12BX/CS)

## (undated) DEVICE — LACTATED RINGERS INJ. 500 ML - (24EA/CA)

## (undated) DEVICE — KIT ROOM DECONTAMINATION

## (undated) DEVICE — KIT SKIN PREP SURG. SOLUTION - DURAPREP (20 KT/CA)

## (undated) DEVICE — TUBING PUMP WITH CONNECTOR REDEUCE (1EA)

## (undated) DEVICE — TUBE E-T HI-LO CUFF 7.5MM (10EA/PK)

## (undated) DEVICE — SENSOR OXIMETER ADULT SPO2 RD SET (20EA/BX)

## (undated) DEVICE — DRESSING AQUACEL AG ADVANTAGE 3.5 X 10" (10EA/BX)"

## (undated) DEVICE — GOWN WARMING STANDARD FLEX - (30/CA)

## (undated) DEVICE — PROTECTOR ULNA NERVE - (36PR/CA)

## (undated) DEVICE — BLADE SAGITTAL SAW 90 X 12.5 X .89MM

## (undated) DEVICE — TUBE CONNECTING SUCTION - CLEAR PLASTIC STERILE 72 IN (50EA/CA)

## (undated) DEVICE — GLOVE BIOGEL INDICATOR SZ 8 SURGICAL PF LTX - (50/BX 4BX/CA)

## (undated) DEVICE — ELECTRODE 850 FOAM ADHESIVE - HYDROGEL RADIOTRNSPRNT (50/PK)

## (undated) DEVICE — SYRINGE NON SAFETY 10 CC 20 GA X 1-1/2 IN (100/BX 4BX/CA)

## (undated) DEVICE — TOOL MR8 14CM BALL 5MM DIAMETER (1/EA)

## (undated) DEVICE — SENSOR SPO2 NEO LNCS ADHESIVE (20/BX) SEE USER NOTES

## (undated) DEVICE — SUTURE 2-0 MONOCRYL PLUS UNDYED CT-1 1 X 36 (36EA/BX)"

## (undated) DEVICE — GLOVE SZ 7 BIOGEL PI MICRO - PF LF (50PR/BX 4BX/CA)

## (undated) DEVICE — SODIUM CHL IRRIGATION 0.9% 1000ML (12EA/CA)

## (undated) DEVICE — GLOVE BIOGEL PI INDICATOR SZ 6.5 SURGICAL PF LF - (50/BX 4BX/CA)

## (undated) DEVICE — STOCKINETTE IMPERVIOUS 12X48 - STERILELF (10/CA)"

## (undated) DEVICE — COVER LIGHT HANDLE FLEXIBLE - SOFT (2EA/PK 80PK/CA)

## (undated) DEVICE — PADDING CAST 6 IN STERILE - 6 X 4 YDS (24/CA)

## (undated) DEVICE — TUBING INSUFFLATION - (10/BX)

## (undated) DEVICE — ELECTRODE 5MM LHK LAPSCP STERILE DISP- MEGADYNE  (5/CA)

## (undated) DEVICE — SUTURE 0 VICRYL PLUS UR-6 - 27 INCH (36/BX)

## (undated) DEVICE — COVER LIGHT HANDLE ALC PLUS DISP (18EA/BX)

## (undated) DEVICE — HUMID-VENT HEAT AND MOISTURE EXCHANGE- (50/BX)

## (undated) DEVICE — BLADE RECIPROCATING 12.7 X 78.7 X 1.0MM (1/EA)

## (undated) DEVICE — BAG, SPONGE COUNT 50600

## (undated) DEVICE — PACK MINOR BASIN - (2EA/CA)

## (undated) DEVICE — GLOVE BIOGEL PI ULTRATOUCH SZ 7.5 SURGICAL PF LF -(50/BX 4BX/CA)

## (undated) DEVICE — SODIUM CHL. IRRIGATION 0.9% 3000ML (4EA/CA 65CA/PF)

## (undated) DEVICE — Device

## (undated) DEVICE — STAPLER SKIN DISP - (6/BX 10BX/CA) VISISTAT

## (undated) DEVICE — DRAPE MICROSCOPE X-LONG (10EA/CA)

## (undated) DEVICE — BLADE SURGICAL #15 - (50/BX 3BX/CA)

## (undated) DEVICE — BAG SPONGE COUNT 10.25 X 32 - BLUE (250/CA)

## (undated) DEVICE — CORDS BIPOLAR COAGULATION - 12FT STERILE DISP. (10EA/BX)

## (undated) DEVICE — TROCAR Z THREAD12MM OPTICAL - NON BLADED (6/BX)

## (undated) DEVICE — DRAPE 36X28IN RAD CARM BND BG - (25/CA) O

## (undated) DEVICE — GLOVE, LITE (PAIR)

## (undated) DEVICE — MASK ANESTHESIA ADULT  - (100/CA)

## (undated) DEVICE — ARMREST CRADLE FOAM - (2PR/PK 12PR/CA)

## (undated) DEVICE — LACTATED RINGERS INJ 1000 ML - (14EA/CA 60CA/PF)

## (undated) DEVICE — DRAPE IOBAN II INCISE 23X17 - (10EA/BX 4BX/CA)

## (undated) DEVICE — SUTURE 3-0 VICRYL PLUS SH - 8X 18 INCH (12/BX)

## (undated) DEVICE — BLADE SHAVER AGGRESSIVE PLUS 4.0MM ANGLED (5EA/BX)

## (undated) DEVICE — TROCARCANN&SEAL 5X55 ZTHREAD - 12/BX

## (undated) DEVICE — HANDPIECE 10FT INTPLS SCT PLS IRRIGATION HAND CONTROL SET (6/PK)

## (undated) DEVICE — SOLUTION PREP PVP IODINE 3/4 OZ POUCH PACKET CONTAINER STERILE LATEX FREE

## (undated) DEVICE — GLOVE BIOGEL SZ 8 SURGICAL PF LTX - (50PR/BX 4BX/CA)

## (undated) DEVICE — TUBING PATIENT W/CONNECTOR REDEUCE (1EA)

## (undated) DEVICE — KIT ANESTHESIA W/CIRCUIT & 3/LT BAG W/FILTER (20EA/CA)

## (undated) DEVICE — TUBING CLEARLINK DUO-VENT - C-FLO (48EA/CA)

## (undated) DEVICE — SUTURE 3-0 ETHILON FS-1 - (36/BX) 30 INCH

## (undated) DEVICE — GLOVE BIOGEL PI INDICATOR SZ 7.5 SURGICAL PF LF -(50/BX 4BX/CA)

## (undated) DEVICE — TOWEL STOP TIMEOUT SAFETY FLAG (40EA/CA)

## (undated) DEVICE — GLOVE BIOGEL SZ 8.5 SURGICAL PF LTX - (50PR/BX 4BX/CA)

## (undated) DEVICE — CLOSURE SKIN STRIP 1/2 X 4 IN - (STERI STRIP) (50/BX 4BX/CA)

## (undated) DEVICE — SUTURE 2-0 COATED VICRYL PLUS - 12 X 18 INCH (12/BX)

## (undated) DEVICE — GLOVE BIOGEL SZ 7 SURGICAL PF LTX - (50PR/BX 4BX/CA)

## (undated) DEVICE — DRAPE LAPAROTOMY T SHEET - (12EA/CA)

## (undated) DEVICE — SUCTION INSTRUMENT YANKAUER BULBOUS TIP W/O VENT (50EA/CA)

## (undated) DEVICE — PACK KNEE ARTHROSCOPY SM OR - (2EA/CA)

## (undated) DEVICE — SET EXTENSION WITH 2 PORTS (48EA/CA) ***PART #2C8610 IS A SUBSTITUTE*****

## (undated) DEVICE — SUTURE 2-0 VICRYL PLUS CT-2 - 27 INCH (36/BX)

## (undated) DEVICE — GVL 4 STAT DISPOSABLE - (10/BX)

## (undated) DEVICE — ELECTRODE DUAL RETURN W/ CORD - (50/PK)

## (undated) DEVICE — SYRINGE 10 ML CONTROL LL (25EA/BX 4BX/CA)

## (undated) DEVICE — TOWELS CLOTH SURGICAL - (4/PK 20PK/CA)

## (undated) DEVICE — SPONGE GAUZESTER 4 X 4 4PLY - (128PK/CA)

## (undated) DEVICE — HEAD HOLDER JUNIOR/ADULT

## (undated) DEVICE — CANISTER SUCTION RIGID RED 1500CC (40EA/CA)

## (undated) DEVICE — SUTURE 2-0 VICRYL PLUS SH - 8 X 18 INCH (12/BX)

## (undated) DEVICE — GLOVE SZ 6 BIOGEL PI MICRO - PF LF (50PR/BX 4BX/CA)

## (undated) DEVICE — NEEDLE NON SAFETY HYPO 22 GA X 1 1/2 IN (100/BX)

## (undated) DEVICE — CHLORAPREP 26 ML APPLICATOR - ORANGE TINT(25/CA)

## (undated) DEVICE — BLADE SAGITTAL 6 SYSTEM 25MM

## (undated) DEVICE — SURGIFOAM (SIZE 100) - (6EA/CA)

## (undated) DEVICE — NEPTUNE 4 PORT MANIFOLD - (20/PK)

## (undated) DEVICE — PACK UNIVERSAL SURGICAL ECLIPSE 1 (5EA/CA)

## (undated) DEVICE — DRESSING 3X8 ADAPTIC GAUZE - NON-ADHERING (36/PK 6PK/BX)

## (undated) DEVICE — GLOVE BIOGEL PI INDICATOR SZ 7.0 SURGICAL PF LF - (50/BX 4BX/CA)

## (undated) DEVICE — COVER MAYO STAND X-LG - (22EA/CA)

## (undated) DEVICE — WATER IRRIGATION STERILE 1000ML (12EA/CA)

## (undated) DEVICE — SUTURE 2-0 SILK SH C/R ETHICON (12PK/BX)

## (undated) DEVICE — SUTURE 4-0 MONOCRYL PLUSPC-3 - 18 INCH (12/BX)

## (undated) DEVICE — GOWN SURGICAL XX-LARGE - (28EA/CA) SIRUS NON REINFORCED

## (undated) DEVICE — KIT EVACUATER 3 SPRING PVC LF 1/8 DRAIN SIZE (10EA/CA)"

## (undated) DEVICE — GLOVE BIOGEL PI ORTHO SZ 7.5 PF LF (40PR/BX)

## (undated) DEVICE — DRAIN PENROSE STERILE 1/4 X - 18 IN  (25EA/BX)

## (undated) DEVICE — AQUACEL 4X4

## (undated) DEVICE — BANDAGE ELASTIC 4 HONEYCOMB - 4"X5YD LF (20/CA)"

## (undated) DEVICE — SLEEVE VASO CALF MED - (10PR/CA)

## (undated) DEVICE — DRAPE STRLE REG TOWEL 18X24 - (10/BX 4BX/CA)"

## (undated) DEVICE — BLADE RECIP 77.5 X 11.2 X .76MM (1/EA)

## (undated) DEVICE — MIXER BONE CEMENT REVOLUTION - W/FEMORAL PRESSURIZER (6/CA)

## (undated) DEVICE — BENZOIN TINCTURE AMPULE

## (undated) DEVICE — SPONGE PEANUT - (5/PK 50PK/CA)

## (undated) DEVICE — PACK CRANI - (1EA/CA)

## (undated) DEVICE — CANISTER SUCTION 3000ML MECHANICAL FILTER AUTO SHUTOFF MEDI-VAC NONSTERILE LF DISP  (40EA/CA)

## (undated) DEVICE — GLOVE BIOGEL INDICATOR SZ 7.5 SURGICAL PF LTX - (50PR/BX 4BX/CA)

## (undated) DEVICE — DRAIN PENROSE 3/8 IN X 12 IN - STERILE (25EA/BX)

## (undated) DEVICE — TIP INTPLS HFLO ML ORFC BTRY - (12/CS)  FOR SURGILAV

## (undated) DEVICE — BLADE SAW OXFORD

## (undated) DEVICE — BLOCK